# Patient Record
Sex: FEMALE | Race: BLACK OR AFRICAN AMERICAN | Employment: OTHER | URBAN - METROPOLITAN AREA
[De-identification: names, ages, dates, MRNs, and addresses within clinical notes are randomized per-mention and may not be internally consistent; named-entity substitution may affect disease eponyms.]

---

## 2017-01-11 ENCOUNTER — OFFICE VISIT (OUTPATIENT)
Dept: ENDOCRINOLOGY | Age: 77
End: 2017-01-11

## 2017-01-11 VITALS
HEIGHT: 63 IN | BODY MASS INDEX: 32.07 KG/M2 | OXYGEN SATURATION: 99 % | WEIGHT: 181 LBS | SYSTOLIC BLOOD PRESSURE: 129 MMHG | RESPIRATION RATE: 16 BRPM | DIASTOLIC BLOOD PRESSURE: 58 MMHG | HEART RATE: 65 BPM

## 2017-01-11 DIAGNOSIS — E78.49 OTHER HYPERLIPIDEMIA: ICD-10-CM

## 2017-01-11 DIAGNOSIS — I10 ESSENTIAL HYPERTENSION, BENIGN: ICD-10-CM

## 2017-01-11 LAB — GLUCOSE POC: 172 MG/DL

## 2017-01-11 RX ORDER — GLIPIZIDE 10 MG/1
10 TABLET, FILM COATED, EXTENDED RELEASE ORAL DAILY
Qty: 90 TAB | Refills: 3 | Status: SHIPPED | OUTPATIENT
Start: 2017-01-11 | End: 2017-06-19 | Stop reason: SDUPTHER

## 2017-01-11 NOTE — PROGRESS NOTES
Endocrinology Visit    Chief Complaint: Type 2 diabetes, uncontrolled    History of Present Illness: Roxane Smalls is a 64 y.o. female who returns for history of uncontrolled type 2 diabetes mellitus with last A1c 9.2% in August 2016. A1c has gradually been increasing, was 8.7% in February and lower prior to that according to . Sorin Calvo. I saw her in initial consultation in October at which time I advised she increase her glipizide CR from 5mg to 10mg and continue metformin. In the interim she reports feeling well but blood sugars are still high. Weight is stable, down 1 lb since October. Current glycemic medication regimen is metformin 1000mg BID and glipizide XR 10mg daily. Home blood glucose monitoring frequency: twice a day, fasting morning and 2 hours after eating. She is using a Prodigy meter, reviewed her glucometer today: lowest is 183, highest is 368. Averages: 14d 249, 28d 249. Fasting this morning was 225. She reports she has been putting the control solution on her meter instead of blood. POC BG in clinic today is 172. Last meal was a turkey sandwich at 12:30pm (2.5 hrs ago). The patient has not had hypoglycemia. She wears a medic alert ID that says she has diabetes. She lives at [facility] but cares for herself- checks her own blood sugar and administers her medications. Known complications include neuropathy and CAD s/p CABG. Recently had surgery for cataracts in July (sees Dr Renetta Fong). Sees Dr Pizarro for podiatry. Last microalbumin was nonelevated at 9.1 (August 2016). Lipids in August: LDL 65, HDL 46, TF 93. GFR is >60. Exercise is described as \"all the time\", walks up the stairs and in the house. Eats 3 meals/day. She is trying to decrease her bread and potato intake. Cooks most meals at home: baked chicken, turkey or fish, creamed potatoes from a box, lots of vegetables and greens. Eats oatmeal for breakfast. Uses sweet-n-low instead of sugar. Avoids pork.  Drinks mostly water, no sodas (only diet if she drinks a soda). Has dessert and fruit on occasion.     Review of Systems   General ROS: negative  Ophthalmic ROS: positive for - decreased vision  ENT ROS: negative  Endocrine ROS: negative  Respiratory ROS: no cough, shortness of breath, or wheezing  Cardiovascular ROS: no chest pain or dyspnea on exertion  Gastrointestinal ROS: no abdominal pain, change in bowel habits, or black or bloody stools  Genito-Urinary ROS: no dysuria, trouble voiding, or hematuria  Musculoskeletal ROS: negative  Neurological ROS: positive for - numbness/tingling  Dermatological ROS: negative    Problem List:  Patient Active Problem List   Diagnosis Code    Glaucoma H40.9    GERD (gastroesophageal reflux disease) K21.9    Uncontrolled type 2 diabetes mellitus with diabetic neuropathy, without long-term current use of insulin ([facility]) E11.40, E11.65    Essential hypertension, benign I10    Hyperlipidemia E78.5    Anxiety F41.9    Osteoarthritis M19.90    Cardiac pacemaker in situ Z95.0    Diabetes ([address] E11.9       Past Medical History:    Past Medical History   Diagnosis Date    Bradycardia     Diabetes ([address]     Hyperlipidemia     PUD (peptic ulcer disease)        Past Surgical History:  Past Surgical History   Procedure Laterality Date    Hx bunionectomy       right foot 11/24/07    Pr cardiac surg procedure unlist       pacemaker, bipass    Colonoscopy  2011    Hx cataract removal  07/2016       Social History:  Social History     Social History    Marital status:      Spouse name: N/A    Number of children: 4    Years of education: N/A     Occupational History    retired      Social History Main Topics    Smoking status: Never Smoker    Smokeless tobacco: Never Used    Alcohol use No    Drug use: No    Sexual activity: Not Currently     Other Topics Concern    Not on file     Social History Narrative       Family History:  Family History   Problem Relation Age of Onset    Diabetes Mother     Hypertension Father        Medications:     Current Outpatient Prescriptions:     ELIQUIS 5 mg tablet, , Disp: , Rfl:     timolol (TIMOPTIC) 0.5 % ophthalmic solution, , Disp: , Rfl:     glipiZIDE SR (GLUCOTROL) 10 mg CR tablet, Take 1 Tab by mouth daily. , Disp: 30 Tab, Rfl: 11    albuterol (PROVENTIL HFA, VENTOLIN HFA) 90 mcg/actuation inhaler, Take 2 Puffs by inhalation every four (4) hours as needed. , Disp: 1 Inhaler, Rfl: 3    simvastatin (ZOCOR) 20 mg tablet, Take 1 Tab by mouth nightly., Disp: 30 Tab, Rfl: 3    diazepam (VALIUM) 2 mg tablet, Take 1 Tab by mouth nightly as needed for Anxiety. , Disp: 30 Tab, Rfl: 2    losartan (COZAAR) 50 mg tablet, Take 1 Tab by mouth daily. , Disp: 30 Tab, Rfl: 5    metFORMIN (GLUCOPHAGE) 1,000 mg tablet, Take 1 Tab by mouth two (2) times daily (with meals). , Disp: 60 Tab, Rfl: 2    glimepiride (AMARYL) 1 mg tablet, Take  by mouth Daily (before breakfast). , Disp: , Rfl:     montelukast (SINGULAIR) 10 mg tablet, Take 10 mg by mouth daily. , Disp: , Rfl:     methenamine hippurate (HIPREX) 1 gram tablet, Take 1 g by mouth two (2) times daily (with meals). , Disp: , Rfl:     lactulose (ENULOSE) 10 gram/15 mL solution, Take  by mouth three (3) times daily. , Disp: , Rfl:     esomeprazole (NEXIUM) 40 mg capsule, Take  by mouth daily. , Disp: , Rfl:     aspirin delayed-release 81 mg tablet, Take 81 mg by mouth daily. , Disp: , Rfl:     brinzolamide (AZOPT) 1 % ophthalmic suspension, Administer 1 Drop to both eyes three (3) times daily. , Disp: , Rfl:     latanoprost (XALATAN) 0.005 % ophthalmic solution, Administer 1 Drop to both eyes nightly., Disp: , Rfl:     hydrochlorothiazide (HYDRODIURIL) 25 mg tablet, Take 25 mg by mouth daily. , Disp: , Rfl:     Azelastine (ASTEPRO) 0.15 % (205.5 mcg) nasal spray, 2 Sprays by Both Nostrils route two (2) times a day., Disp: 1 Bottle, Rfl: 11    timolol (TIMOPTIC-XR) 0.5 % ophthalmic gel-forming, Administer 1 Drop to both eyes daily. , Disp: , Rfl:     Allergies:  No Known Allergies    Physical Examination:  Blood pressure 129/58, pulse 65, resp. rate 16, height 5' 3\" (1.6 m), weight 181 lb (82.1 kg), SpO2 99 %. Body mass index is 32.06 kg/(m^2). Gen: elderly female in no acute distress  HEENT: mucous membranes moist, normocephalic, non traumatic  Thyroid: no enlargement or nodules noted  CAD: normal rate, regular rhythm. No murmur rubs or gallops  PULM: clear to ausculation, no wheezes, rhonchis or rales. GI: soft non tender, non distended. EXT: no clubbing, cyanosis or edema, wearing compression hose and diabetic shoes  Neuro: grossly non focal, normal DTRs  Psych: pleasant, good insight into medical hx    Clinical Data Review: There are no results available in [facility]. Pertinent lab results from outside records are transcribed in HPI and scanned into the medical record. Assessment and Plan:  Patient is a 64y.o. year old female here for type 2 diabetes with suboptimal glycemic control on metformin + sulfonylurea therapy. POC BG data is inaccurate because pt is applying the control solution to her test strips rather than blood (control solution contains glucose). Instructed her and demonstrated the correct way to check capillary blood glucose. Will continue current regimen based on limited BG data today (post-prandial ). Goal BG is between 100 and 200, goal A1c 7.5 to 8%. Review BG log at her next visit in 3 months. If control remains suboptimal, will start low dose basal insulin in addition to oral agents.      Glycemic Medication Changes:  - continue glipizide XR 10mg daily AC breakfast  - continue metformin 1000mg BID  - check BG twice a day and bring meter or log to next visit    DM Health Maintenance: pertinent items updated in  tab  A1c: update with next blood draw in Feb  Cv/Lipids: on Simvastatin, lipids UTD  BP/Renal: BP at goal, on ARB, microalbumin UTD  Vaccines: per PCP  Podiatry: no active issues, encouraged well-fitting footwear and daily inspection  Neruo: no evidence of neuropathy - foot exam updated Oct 2016  Ophtho: yearly eye exam recommended  Diet and exercise: discussed healthy eating and exercise recommendations, particularly reduction in dietary carbohydrates    We have discussed the importance of the need for good blood pressure and glycemic control to further reduce the risks of microvascular and macrovascular complications. We have discussed how to recognize and treat hypoglycemia. She will notify me in between appointments for hypoglycemia or persistent hyperglycemia and has my contact information. I spent 40 minutes with the patient today and > 50% of the time was spent counseling the patient about diabetes medications, preventative care, and dietary modification. Patient verbalized an understanding and will return to clinic in 3 months. Thank you for the opportunity to participate in this patient's care.     Claire Rivera MD  [facility]  [facility]

## 2017-01-11 NOTE — MR AVS SNAPSHOT
Visit Information Date & Time Provider Department Dept. Phone Encounter #  
 1/11/2017  2:30 PM Elena Saldivar, [facility] [phone] Follow-up Instructions Return in about 3 months (around 4/11/2017). Upcoming Health Maintenance Date Due  
 FOOT EXAM Q1 8/7/1950 MICROALBUMIN Q1 8/7/1950 DTaP/Tdap/Td series (1 - Tdap) 8/7/1959 ZOSTER VACCINE AGE 60> 8/7/1998 GLAUCOMA SCREENING Q2Y 8/7/2003 OSTEOPOROSIS SCREENING (DEXA) 8/7/2003 MEDICARE YEARLY EXAM 8/7/2003 HEMOGLOBIN A1C Q6M 5/1/2013 EYE EXAM RETINAL OR DILATED Q1 8/1/2013 LIPID PANEL Q1 11/1/2013 Pneumococcal 65+ Low/Medium Risk (2 of 2 - PPSV23) 1/28/2016 Allergies as of 1/11/2017  Review Complete On: 1/11/2017 By: Elena Saldivar MD  
 No Known Allergies Current Immunizations  Never Reviewed Name Date Pneumococcal Vaccine (Unspecified Type) 1/28/2011 Not reviewed this visit You Were Diagnosed With   
  
 Codes Comments Uncontrolled type 2 diabetes mellitus with diabetic neuropathy, without long-term current use of insulin ([facility]    -  Primary ICD-10-CM: E11.40, E11.65 ICD-9-CM: 250.62, 357.2 Vitals BP Pulse Resp Height(growth percentile) Weight(growth percentile) SpO2  
 129/58 65 16 5' 3\" (1.6 m) 181 lb (82.1 kg) 99% BMI OB Status Smoking Status 32.06 kg/m2 Postmenopausal Never Smoker Vitals History BMI and BSA Data Body Mass Index Body Surface Area 32.06 kg/m 2 1.91 m 2 Preferred Pharmacy Pharmacy Name Phone [facility] [address], [address] [phone] Your Updated Medication List  
  
   
This list is accurate as of: 1/11/17  3:15 PM.  Always use your most recent med list.  
  
  
  
  
 albuterol 90 mcg/actuation inhaler Commonly known as:  PROVENTIL HFA, VENTOLIN HFA, PROAIR HFA  
 Take 2 Puffs by inhalation every four (4) hours as needed. aspirin delayed-release 81 mg tablet Take 81 mg by mouth daily. Azelastine 0.15 % (205.5 mcg) nasal spray Commonly known as:  ASTEPRO  
2 Sprays by Both Nostrils route two (2) times a day. AZOPT 1 % ophthalmic suspension Generic drug:  brinzolamide Administer 1 Drop to both eyes three (3) times daily. diazePAM 2 mg tablet Commonly known as:  VALIUM Take 1 Tab by mouth nightly as needed for Anxiety. ELIQUIS 5 mg tablet Generic drug:  apixaban ENULOSE 10 gram/15 mL solution Generic drug:  lactulose Take  by mouth three (3) times daily. glipiZIDE SR 10 mg CR tablet Commonly known as:  GLUCOTROL XL Take 1 Tab by mouth daily. hydroCHLOROthiazide 25 mg tablet Commonly known as:  HYDRODIURIL Take 25 mg by mouth daily. losartan 50 mg tablet Commonly known as:  COZAAR Take 1 Tab by mouth daily. metFORMIN 1,000 mg tablet Commonly known as:  GLUCOPHAGE Take 1 Tab by mouth two (2) times daily (with meals). methenamine hippurate 1 gram tablet Commonly known as:  HIPREX Take 1 g by mouth two (2) times daily (with meals). NexIUM 40 mg capsule Generic drug:  esomeprazole Take  by mouth daily. simvastatin 20 mg tablet Commonly known as:  ZOCOR Take 1 Tab by mouth nightly. SINGULAIR 10 mg tablet Generic drug:  montelukast  
Take 10 mg by mouth daily. * timolol 0.5 % ophthalmic gel-forming Commonly known as:  TIMOPTIC-XE Administer 1 Drop to both eyes daily. * timolol 0.5 % ophthalmic solution Commonly known as:  TIMOPTIC  
  
 XALATAN 0.005 % ophthalmic solution Generic drug:  latanoprost  
Administer 1 Drop to both eyes nightly. * Notice: This list has 2 medication(s) that are the same as other medications prescribed for you.  Read the directions carefully, and ask your doctor or other care provider to review them with you. We Performed the Following AMB POC GLUCOSE, QUANTITATIVE, BLOOD [71049 CPT(R)] Follow-up Instructions Return in about 3 months (around 4/11/2017). To-Do List   
 Around 02/01/2017 Lab:  HEMOGLOBIN A1C WITH EAG Patient Instructions Continue taking the following diabetes medications: - Glipizide 10mg before breakfast 
- Metformin 1000mg twice a day (after breakfast and after dinner) Check blood sugar in the morning and in the evening. Have your A1c checked next time you have bloodwork done in February. Please have your doctor fax me the results. 
 
=================================================================== 
 
Endocrinology Instructions Thank you for choosing [facility] to care for you and your health needs. Please follow these instructions to get in touch with us if you have a problem and to prepare for upcoming appointments. 1. For any questions regarding prescriptions or if you need medical advice, our office number is [phone].  
2. Our office fax number is [phone]. 
3. You may also use [employer] to access your lab results or to send messages to your provider. 4. If there are routine labs that need to be checked, please have these done within 2-7 days prior to your next appointment. We will provide you with a copy of the lab order. 5. If you have diabetes, please make sure you bring your blood sugar log or glucometer with you to each office visit. Thank you, MD [facility] [address] [facility] Introducing [facility]! [facility] introduces [employer] patient portal. Now you can access parts of your medical record, email your doctor's office, and request medication refills online. 1. In your internet browser, go to [email or web address] 2. Click on the First Time User? Click Here link in the Sign In box. You will see the New Member Sign Up page. 3. Enter your [employer] Access Code exactly as it appears below. You will not need to use this code after youve completed the sign-up process. If you do not sign up before the expiration date, you must request a new code. · [employer] Access Code: [email or web address] Expires: 1/16/2017 10:59 AM 
 
4. Enter the last four digits of your Social Security Number (xxxx) and Date of Birth (mm/dd/yyyy) as indicated and click Submit. You will be taken to the next sign-up page. 5. Create a [employer] prime ID. This will be your [employer] login ID and cannot be changed, so think of one that is secure and easy to remember. 6. Create a [employer] password. You can change your password at any time. 7. Enter your Password Reset Question and Answer. This can be used at a later time if you forget your password. 8. Enter your e-mail address. You will receive e-mail notification when new information is available in [address] 9. Click Sign Up. You can now view and download portions of your medical record. 10. Click the Download Summary menu link to download a portable copy of your medical information. If you have questions, please visit the Frequently Asked Questions section of the [employer] website. Remember, [employer] is NOT to be used for urgent needs. For medical emergencies, dial 911. Now available from your iPhone and Android! Please provide this summary of care documentation to your next provider. Your primary care clinician is listed as Christiane Serna. If you have any questions after today's visit, please call [phone].

## 2017-01-24 ENCOUNTER — TELEPHONE (OUTPATIENT)
Dept: ENDOCRINOLOGY | Age: 77
End: 2017-01-24

## 2017-01-24 NOTE — TELEPHONE ENCOUNTER
----- Message from Roxy Perea sent at 1/24/2017  9:37 AM EST -----  Regarding: please call  Contact: [phone]  1/24/2017  9:37 AM      Please call Ms. Calli Berry regarding a form she received in the mail [phone].       Thanks

## 2017-01-25 NOTE — TELEPHONE ENCOUNTER
----- Message from Carole Ortega MD sent at 1/24/2017  3:30 PM EST -----  Regarding: FW: Buffy Sahu / Telephone      ----- Message -----     From: Owen Cody     Sent: 1/24/2017   3:17 PM       To: Carole Ortega MD  Subject: Lisa Levine / Telephone                           1/24/2017  3:17 PM    Please see message below. Thanks   ----- Message -----     From: Gurmeet Mcmillan     Sent: 1/24/2017   3:12 PM       To: [address] [id number] Office Pool  Subject: Buffy Sahu / Telephone                               Pt is returning a call back to practice. Best contact number [phone].

## 2017-04-04 ENCOUNTER — OFFICE VISIT (OUTPATIENT)
Dept: ENDOCRINOLOGY | Age: 77
End: 2017-04-04

## 2017-04-04 VITALS
WEIGHT: 180 LBS | DIASTOLIC BLOOD PRESSURE: 74 MMHG | OXYGEN SATURATION: 98 % | HEART RATE: 66 BPM | HEIGHT: 63 IN | RESPIRATION RATE: 16 BRPM | SYSTOLIC BLOOD PRESSURE: 113 MMHG | BODY MASS INDEX: 31.89 KG/M2

## 2017-04-04 RX ORDER — METFORMIN HYDROCHLORIDE 1000 MG/1
1000 TABLET ORAL 2 TIMES DAILY WITH MEALS
Qty: 180 TAB | Refills: 3 | Status: SHIPPED | OUTPATIENT
Start: 2017-04-04 | End: 2017-11-03 | Stop reason: SDUPTHER

## 2017-04-04 RX ORDER — MIRABEGRON 50 MG/1
50 TABLET, FILM COATED, EXTENDED RELEASE ORAL EVERY EVENING
COMMUNITY
Start: 2017-02-27 | End: 2018-09-04

## 2017-04-04 NOTE — PROGRESS NOTES
Endocrinology Visit    Chief Complaint: Type 2 diabetes, uncontrolled    History of Present Illness: Maris Ly is a 64 y.o. female who returns for history of uncontrolled type 2 diabetes mellitus with last A1c 9.2% in Feb 2017. A1c has gradually been increasing, was 8.7% in February and lower prior to that according to . [place]. I saw her in initial consultation in October at which time I advised she increase her glipizide CR from 5mg to 10mg and continue metformin. In the interim she reports feeling well but blood sugars are much better, all in the 100s now. Weight is stable, down 1 lb since January. Current glycemic medication regimen is metformin 1000mg BID and glipizide XR 10mg daily. Home blood glucose monitoring frequency: twice a day, fasting morning and 2 hours after eating dinner  Log review:  fasting every morning (avg around 130); 113-162 (avg around 150) in the evening  The patient has not had hypoglycemia. She wears a medic alert ID that says she has diabetes. She lives at [facility] but cares for herself- checks her own blood sugar and administers her medications. Known complications include neuropathy and CAD s/p CABG. She had surgery for cataracts in July 2016 (sees Dr Serena Youngblood). Sees Dr Denver Miller for podiatry. Last microalbumin was nonelevated at 9.1 (August 2016). Lipids in August: LDL 65, HDL 46, TF 93. GFR is >60. She is seeing a gastroenterologist and will be having an EGD done soon for what sounds like GERD. Exercise is described as \"all the time\", walks up the stairs and in the house. Eats 3 meals/day. She is trying to decrease her bread and potato intake. Cooks most meals at home: baked chicken, turkey or fish, creamed potatoes from a box, lots of vegetables and greens. Eats oatmeal for breakfast. Uses sweet-n-low instead of sugar. Avoids pork. Drinks mostly water, no sodas (only diet if she drinks a soda). Has dessert and fruit on occasion.     Review of Systems General ROS: negative  Ophthalmic ROS: positive for - decreased vision  ENT ROS: negative  Endocrine ROS: negative  Respiratory ROS: no cough, shortness of breath, or wheezing  Cardiovascular ROS: no chest pain or dyspnea on exertion  Gastrointestinal ROS: + acid reflux  Genito-Urinary ROS: no dysuria, trouble voiding, or hematuria  Musculoskeletal ROS: negative  Neurological ROS: positive for - numbness/tingling  Dermatological ROS: negative    Problem List:  Patient Active Problem List   Diagnosis Code    Glaucoma H40.9    GERD (gastroesophageal reflux disease) K21.9    Uncontrolled type 2 diabetes mellitus with diabetic neuropathy, without long-term current use of insulin ([facility]) E11.40, E11.65    Essential hypertension, benign I10    Hyperlipidemia E78.5    Anxiety F41.9    Osteoarthritis M19.90    Cardiac pacemaker in situ Z95.0    Diabetes ([facility] E11.9       Past Medical History:    Past Medical History:   Diagnosis Date    Bradycardia     Diabetes ([facility]     Hyperlipidemia     PUD (peptic ulcer disease)        Past Surgical History:  Past Surgical History:   Procedure Laterality Date    CARDIAC SURG PROCEDURE UNLIST      pacemaker, bipass    COLONOSCOPY  2011    HX BUNIONECTOMY      right foot 11/24/07    HX CATARACT REMOVAL  07/2016       Social History:  Social History     Social History    Marital status:      Spouse name: N/A    Number of children: 4    Years of education: N/A     Occupational History    retired      Social History Main Topics    Smoking status: Never Smoker    Smokeless tobacco: Never Used    Alcohol use No    Drug use: No    Sexual activity: Not Currently     Other Topics Concern    Not on file     Social History Narrative       Family History:  Family History   Problem Relation Age of Onset    Diabetes Mother     Hypertension Father        Medications:     Current Outpatient Prescriptions:     MYRBETRIQ 50 mg ER tablet, , Disp: , Rfl:     ELIQUIS 5 mg tablet, , Disp: , Rfl:     timolol (TIMOPTIC) 0.5 % ophthalmic solution, , Disp: , Rfl:     albuterol (PROVENTIL HFA, VENTOLIN HFA) 90 mcg/actuation inhaler, Take 2 Puffs by inhalation every four (4) hours as needed. , Disp: 1 Inhaler, Rfl: 3    simvastatin (ZOCOR) 20 mg tablet, Take 1 Tab by mouth nightly., Disp: 30 Tab, Rfl: 3    diazepam (VALIUM) 2 mg tablet, Take 1 Tab by mouth nightly as needed for Anxiety. , Disp: 30 Tab, Rfl: 2    losartan (COZAAR) 50 mg tablet, Take 1 Tab by mouth daily. , Disp: 30 Tab, Rfl: 5    metFORMIN (GLUCOPHAGE) 1,000 mg tablet, Take 1 Tab by mouth two (2) times daily (with meals). , Disp: 60 Tab, Rfl: 2    montelukast (SINGULAIR) 10 mg tablet, Take 10 mg by mouth daily. , Disp: , Rfl:     lactulose (ENULOSE) 10 gram/15 mL solution, Take  by mouth three (3) times daily. , Disp: , Rfl:     aspirin delayed-release 81 mg tablet, Take 81 mg by mouth daily. , Disp: , Rfl:     brinzolamide (AZOPT) 1 % ophthalmic suspension, Administer 1 Drop to both eyes three (3) times daily. , Disp: , Rfl:     latanoprost (XALATAN) 0.005 % ophthalmic solution, Administer 1 Drop to both eyes nightly., Disp: , Rfl:     hydrochlorothiazide (HYDRODIURIL) 25 mg tablet, Take 25 mg by mouth daily. , Disp: , Rfl:     glipiZIDE SR (GLUCOTROL XL) 10 mg CR tablet, Take 1 Tab by mouth daily. , Disp: 90 Tab, Rfl: 3    Azelastine (ASTEPRO) 0.15 % (205.5 mcg) nasal spray, 2 Sprays by Both Nostrils route two (2) times a day., Disp: 1 Bottle, Rfl: 11    methenamine hippurate (HIPREX) 1 gram tablet, Take 1 g by mouth two (2) times daily (with meals). , Disp: , Rfl:     esomeprazole (NEXIUM) 40 mg capsule, Take  by mouth daily. , Disp: , Rfl:     timolol (TIMOPTIC-XR) 0.5 % ophthalmic gel-forming, Administer 1 Drop to both eyes daily. , Disp: , Rfl:     Allergies:  No Known Allergies    Physical Examination:  Blood pressure 113/74, pulse 66, resp.  rate 16, height 5' 3\" (1.6 m), weight 180 lb (81.6 kg), SpO2 98 %.  Body mass index is 31.89 kg/(m^2). Gen: elderly female in no acute distress  HEENT: mucous membranes moist, normocephalic, non traumatic  Thyroid: no enlargement or nodules noted  CAD: normal rate, regular rhythm. No murmur rubs or gallops  PULM: clear to ausculation, no wheezes, rhonchis or rales. GI: soft non tender, non distended. EXT: no clubbing, cyanosis or edema, wearing compression hose and diabetic shoes  Neuro: grossly non focal, normal DTRs  Psych: pleasant, good insight into medical hx    Clinical Data Review: There are no results available in [facility]. Pertinent lab results from outside records are transcribed in HPI and scanned into the medical record. Assessment and Plan:  Patient is a 64y.o. year old female here for type 2 diabetes with improved glycemic control on metformin + sulfonylurea therapy. POC BG data reviewed today correlates with an A1c around 7% so I am concerned that her A1c value of 9.2% may be inaccurate. Will check a fructosamine today since the CBG data does not correlate with her A1c. Will continue current regimen based on BG data today. Goal BG is between 100 and 200, goal A1c 7.5 to 8%. Review BG log at her next visit in 3 months. If control remains suboptimal, will start low dose basal insulin in addition to oral agents.      Glycemic Medication Changes:  - continue glipizide XR 10mg daily AC breakfast  - continue metformin 1000mg BID  - check BG twice a day and bring meter or log to next visit    DM Health Maintenance: pertinent items updated in HM tab  A1c: update today with fructosamine  Cv/Lipids: on Simvastatin, lipids UTD  BP/Renal: BP at goal, on ARB, microalbumin UTD  Vaccines: per PCP  Podiatry: no active issues, encouraged well-fitting footwear and daily inspection  Neruo: no evidence of neuropathy - foot exam updated Oct 2016  Ophtho: yearly eye exam recommended  Diet and exercise: discussed healthy eating and exercise recommendations, particularly reduction in dietary carbohydrates    We have discussed the importance of the need for good blood pressure and glycemic control to further reduce the risks of microvascular and macrovascular complications. We have discussed how to recognize and treat hypoglycemia. She will notify me in between appointments for hypoglycemia or persistent hyperglycemia and has my contact information. I spent 25 minutes with the patient today and > 50% of the time was spent counseling the patient about diabetes medications, preventative care, and dietary modification. Patient verbalized an understanding and will return to clinic in 3-4 months. Thank you for the opportunity to participate in this patient's care. Matheus Taylor MD  [facility]  [address] [place] [address]    ------------------------------------------------------------------------  ADDENDUM 4/17/17:    Pt notified of results via phone and letter. Fructosamine correlates with an A1c of around 8.5%.     Component      Latest Ref Rng & Units 4/4/2017 4/4/2017           3:44 PM  3:44 PM   Hemoglobin A1c, (calculated)      4.8 - 5.6 % 8.7 (H)    Estimated average glucose      mg/dL 203    Fructosamine      0 - 285 umol/L  353 (H)

## 2017-04-04 NOTE — MR AVS SNAPSHOT
Visit Information Date & Time Provider Department Dept. Phone Encounter #  
 4/4/2017  2:30 PM Samra Stokes, [facility] [phone] [id number] Follow-up Instructions Return in about 3 months (around 7/4/2017). Upcoming Health Maintenance Date Due  
 FOOT EXAM Q1 8/7/1950 MICROALBUMIN Q1 8/7/1948 DTaP/Tdap/Td series (1 - Tdap) 8/7/1959 ZOSTER VACCINE AGE 60> 8/7/1998 GLAUCOMA SCREENING Q2Y 8/7/2003 OSTEOPOROSIS SCREENING (DEXA) 8/7/2003 MEDICARE YEARLY EXAM 8/7/2003 HEMOGLOBIN A1C Q6M 5/1/2013 EYE EXAM RETINAL OR DILATED Q1 8/1/2013 LIPID PANEL Q1 11/1/2013 Pneumococcal 65+ Low/Medium Risk (2 of 2 - PPSV23) 1/28/2016 Allergies as of 4/4/2017  Review Complete On: 4/4/2017 By: Subhash Carmichael No Known Allergies Current Immunizations  Never Reviewed Name Date Pneumococcal Vaccine (Unspecified Type) 1/28/2011 Not reviewed this visit You Were Diagnosed With   
  
 Codes Comments Uncontrolled type 2 diabetes mellitus with diabetic neuropathy, without long-term current use of insulin ([facility]    -  Primary ICD-10-CM: E11.40, E11.65 ICD-9-CM: 250.62, 357.2 Vitals BP Pulse Resp Height(growth percentile) Weight(growth percentile) SpO2  
 113/74 66 16 5' 3\" (1.6 m) 180 lb (81.6 kg) 98% BMI OB Status Smoking Status 31.89 kg/m2 Postmenopausal Never Smoker Vitals History BMI and BSA Data Body Mass Index Body Surface Area  
 31.89 kg/m 2 1.9 m 2 Preferred Pharmacy Pharmacy Name Phone [facility] [address] [facility], [address] [place] [address],[address] [phone] Your Updated Medication List  
  
   
This list is accurate as of: 4/4/17  3:20 PM.  Always use your most recent med list.  
  
  
  
  
 albuterol 90 mcg/actuation inhaler Commonly known as:  PROVENTIL HFA, VENTOLIN HFA, PROAIR HFA  
 Take 2 Puffs by inhalation every four (4) hours as needed. aspirin delayed-release 81 mg tablet Take 81 mg by mouth daily. Azelastine 0.15 % (205.5 mcg) nasal spray Commonly known as:  ASTEPRO  
2 Sprays by Both Nostrils route two (2) times a day. AZOPT 1 % ophthalmic suspension Generic drug:  brinzolamide Administer 1 Drop to both eyes three (3) times daily. diazePAM 2 mg tablet Commonly known as:  VALIUM Take 1 Tab by mouth nightly as needed for Anxiety. ELIQUIS 5 mg tablet Generic drug:  apixaban ENULOSE 10 gram/15 mL solution Generic drug:  lactulose Take  by mouth three (3) times daily. glipiZIDE SR 10 mg CR tablet Commonly known as:  GLUCOTROL XL Take 1 Tab by mouth daily. hydroCHLOROthiazide 25 mg tablet Commonly known as:  HYDRODIURIL Take 25 mg by mouth daily. losartan 50 mg tablet Commonly known as:  COZAAR Take 1 Tab by mouth daily. metFORMIN 1,000 mg tablet Commonly known as:  GLUCOPHAGE Take 1 Tab by mouth two (2) times daily (with meals). methenamine hippurate 1 gram tablet Commonly known as:  HIPREX Take 1 g by mouth two (2) times daily (with meals). MYRBETRIQ 50 mg ER tablet Generic drug:  mirabegron ER NexIUM 40 mg capsule Generic drug:  esomeprazole Take  by mouth daily. simvastatin 20 mg tablet Commonly known as:  ZOCOR Take 1 Tab by mouth nightly. SINGULAIR 10 mg tablet Generic drug:  montelukast  
Take 10 mg by mouth daily. * timolol 0.5 % ophthalmic gel-forming Commonly known as:  TIMOPTIC-XE Administer 1 Drop to both eyes daily. * timolol 0.5 % ophthalmic solution Commonly known as:  TIMOPTIC  
  
 XALATAN 0.005 % ophthalmic solution Generic drug:  latanoprost  
Administer 1 Drop to both eyes nightly. * Notice:   This list has 2 medication(s) that are the same as other medications prescribed for you. Read the directions carefully, and ask your doctor or other care provider to review them with you. Prescriptions Sent to Pharmacy Refills  
 metFORMIN (GLUCOPHAGE) 1,000 mg tablet 3 Sig: Take 1 Tab by mouth two (2) times daily (with meals). Class: Normal  
 Pharmacy: [address] [facility], [address] [place] [address],[facility] Floor  #: [phone] Route: Oral  
  
We Performed the Following FRUCTOSAMINE [31291 CPT(R)] HEMOGLOBIN A1C WITH EAG [93790 CPT(R)] Follow-up Instructions Return in about 3 months (around 7/4/2017). Introducing [facility]! [facility] introduces MyBuilder patient portal. Now you can access parts of your medical record, email your doctor's office, and request medication refills online. 1. In your internet browser, go to [email or web address] 2. Click on the First Time User? Click Here link in the Sign In box. You will see the New Member Sign Up page. 3. Enter your MyBuilder Access Code exactly as it appears below. You will not need to use this code after youve completed the sign-up process. If you do not sign up before the expiration date, you must request a new code. · MyBuilder Access Code: [email or web address] Expires: 7/3/2017  3:13 PM 
 
4. Enter the last four digits of your Social Security Number (xxxx) and Date of Birth (mm/dd/yyyy) as indicated and click Submit. You will be taken to the next sign-up page. 5. Create a Glassbeamt ID. This will be your MyBuilder login ID and cannot be changed, so think of one that is secure and easy to remember. 6. Create a MyBuilder password. You can change your password at any time. 7. Enter your Password Reset Question and Answer. This can be used at a later time if you forget your password. 8. Enter your e-mail address. You will receive e-mail notification when new information is available in [address] 9. Click Sign Up. You can now view and download portions of your medical record. 10. Click the Download Summary menu link to download a portable copy of your medical information. If you have questions, please visit the Frequently Asked Questions section of the [employer] website. Remember, [employer] is NOT to be used for urgent needs. For medical emergencies, dial 911. Now available from your iPhone and Android! Please provide this summary of care documentation to your next provider. Your primary care clinician is listed as Amelia Manzanares. If you have any questions after today's visit, please call [phone].

## 2017-04-05 LAB
EST. AVERAGE GLUCOSE BLD GHB EST-MCNC: 203 MG/DL
FRUCTOSAMINE SERPL-SCNC: 353 UMOL/L (ref 0–285)
HBA1C MFR BLD: 8.7 % (ref 4.8–5.6)

## 2017-06-19 NOTE — TELEPHONE ENCOUNTER
----- Message from Darylene Hampshire sent at 6/19/2017 11:11 AM EDT -----  Regarding: Dr. Harrison Thayer   Pt is requesting Rx for \" Glipizide\" to be sent to her [address] (P)[phone].      Best(C)[phone]

## 2017-06-19 NOTE — TELEPHONE ENCOUNTER
6/19/2017  11:40 AM      Please see message from [facility].         Thanks

## 2017-06-20 RX ORDER — GLIPIZIDE 10 MG/1
10 TABLET, FILM COATED, EXTENDED RELEASE ORAL DAILY
Qty: 90 TAB | Refills: 1 | Status: SHIPPED | OUTPATIENT
Start: 2017-06-20 | End: 2017-09-15 | Stop reason: SDUPTHER

## 2017-07-11 ENCOUNTER — OFFICE VISIT (OUTPATIENT)
Dept: ENDOCRINOLOGY | Age: 77
End: 2017-07-11

## 2017-07-11 VITALS
RESPIRATION RATE: 16 BRPM | HEIGHT: 63 IN | HEART RATE: 66 BPM | OXYGEN SATURATION: 98 % | DIASTOLIC BLOOD PRESSURE: 72 MMHG | SYSTOLIC BLOOD PRESSURE: 110 MMHG | BODY MASS INDEX: 30.83 KG/M2 | WEIGHT: 174 LBS

## 2017-07-11 RX ORDER — CYANOCOBALAMIN (VITAMIN B-12) 500 MCG
400 TABLET ORAL 2 TIMES DAILY
COMMUNITY

## 2017-07-11 RX ORDER — LANOLIN ALCOHOL/MO/W.PET/CERES
CREAM (GRAM) TOPICAL
COMMUNITY
End: 2018-10-01

## 2017-07-11 RX ORDER — FLUTICASONE PROPIONATE 50 MCG
SPRAY, SUSPENSION (ML) NASAL
COMMUNITY
Start: 2017-06-21 | End: 2018-09-04

## 2017-07-11 RX ORDER — CLINDAMYCIN HYDROCHLORIDE 150 MG/1
CAPSULE ORAL
COMMUNITY
Start: 2017-04-21 | End: 2018-08-27

## 2017-07-11 RX ORDER — ACETAMINOPHEN AND CODEINE PHOSPHATE 300; 30 MG/1; MG/1
TABLET ORAL
COMMUNITY
Start: 2017-04-21 | End: 2018-10-01

## 2017-07-11 RX ORDER — PIOGLITAZONEHYDROCHLORIDE 15 MG/1
TABLET ORAL
Qty: 30 TAB | Refills: 5 | Status: SHIPPED | OUTPATIENT
Start: 2017-07-11 | End: 2017-07-27

## 2017-07-11 RX ORDER — SUCRALFATE 1 G/1
1 TABLET ORAL 2 TIMES DAILY
COMMUNITY
Start: 2017-06-28

## 2017-07-11 NOTE — PATIENT INSTRUCTIONS
Diabetes Medications:  - start taking Actos (pioglitazone) 15mg daily  - continue taking Glipizide 10mg every morning  - continue taking metformin 1000mg twice a day    Twice a week: check your blood sugar 1-2 hours after a dinner  Goal blood sugars: 100-150  Call me if you have blood sugars less than 80

## 2017-07-11 NOTE — MR AVS SNAPSHOT
Visit Information Date & Time Provider Department Dept. Phone Encounter #  
 7/11/2017  2:30 PM Jules Miller, [address] [facility] Diabetes and Endocrinology [phone] [id number] Follow-up Instructions Return in about 3 months (around 10/11/2017). Upcoming Health Maintenance Date Due  
 FOOT EXAM Q1 8/7/1950 MICROALBUMIN Q1 8/7/1948 DTaP/Tdap/Td series (1 - Tdap) 8/7/1959 ZOSTER VACCINE AGE 60> 8/7/1998 OSTEOPOROSIS SCREENING (DEXA) 8/7/2003 MEDICARE YEARLY EXAM 8/7/2003 LIPID PANEL Q1 11/1/2013 Pneumococcal 65+ Low/Medium Risk (2 of 2 - PPSV23) 1/28/2016 INFLUENZA AGE 9 TO ADULT 8/1/2017 HEMOGLOBIN A1C Q6M 10/4/2017 EYE EXAM RETINAL OR DILATED Q1 12/20/2017 GLAUCOMA SCREENING Q2Y 12/20/2018 Allergies as of 7/11/2017  Review Complete On: 7/11/2017 By: Jerome Loss No Known Allergies Current Immunizations  Never Reviewed Name Date Pneumococcal Vaccine (Unspecified Type) 1/28/2011 Not reviewed this visit You Were Diagnosed With   
  
 Codes Comments Uncontrolled type 2 diabetes mellitus with diabetic neuropathy, without long-term current use of insulin ([facility]    -  Primary ICD-10-CM: E11.40, E11.65 ICD-9-CM: 250.62, 357.2 Vitals BP Pulse Resp Height(growth percentile) Weight(growth percentile) SpO2  
 110/72 66 16 5' 3\" (1.6 m) 174 lb (78.9 kg) 98% BMI OB Status Smoking Status 30.82 kg/m2 Postmenopausal Never Smoker Vitals History BMI and BSA Data Body Mass Index Body Surface Area  
 30.82 kg/m 2 1.87 m 2 Preferred Pharmacy Pharmacy Name Phone [facility] [address] [facility], [address] [facility] Floor [phone] Your Updated Medication List  
  
   
This list is accurate as of: 7/11/17  3:19 PM.  Always use your most recent med list.  
  
  
  
  
 acetaminophen-codeine 300-30 mg per tablet Commonly known as:  TYLENOL #3  
  
 albuterol 90 mcg/actuation inhaler Commonly known as:  PROVENTIL HFA, VENTOLIN HFA, PROAIR HFA Take 2 Puffs by inhalation every four (4) hours as needed. aspirin delayed-release 81 mg tablet Take 81 mg by mouth daily. Azelastine 0.15 % (205.5 mcg) nasal spray Commonly known as:  ASTEPRO  
2 Sprays by Both Nostrils route two (2) times a day. AZOPT 1 % ophthalmic suspension Generic drug:  brinzolamide Administer 1 Drop to both eyes three (3) times daily. clindamycin 150 mg capsule Commonly known as:  CLEOCIN  
  
 diazePAM 2 mg tablet Commonly known as:  VALIUM Take 1 Tab by mouth nightly as needed for Anxiety. ELIQUIS 5 mg tablet Generic drug:  apixaban ENULOSE 10 gram/15 mL solution Generic drug:  lactulose Take  by mouth three (3) times daily. fluticasone 50 mcg/actuation nasal spray Commonly known as:  FLONASE  
  
 glipiZIDE SR 10 mg CR tablet Commonly known as:  GLUCOTROL XL Take 1 Tab by mouth daily. hydroCHLOROthiazide 25 mg tablet Commonly known as:  HYDRODIURIL Take 25 mg by mouth daily. Iron 325 mg (65 mg iron) tablet Generic drug:  ferrous sulfate Take  by mouth Daily (before breakfast). losartan 50 mg tablet Commonly known as:  COZAAR Take 1 Tab by mouth daily. metFORMIN 1,000 mg tablet Commonly known as:  GLUCOPHAGE Take 1 Tab by mouth two (2) times daily (with meals). methenamine hippurate 1 gram tablet Commonly known as:  HIPREX Take 1 g by mouth two (2) times daily (with meals). MYRBETRIQ 50 mg ER tablet Generic drug:  mirabegron ER NexIUM 40 mg capsule Generic drug:  esomeprazole Take  by mouth daily. pioglitazone 15 mg tablet Commonly known as:  ACTOS Take 1 tablet daily  
  
 simvastatin 20 mg tablet Commonly known as:  ZOCOR Take 1 Tab by mouth nightly. SINGULAIR 10 mg tablet Generic drug:  montelukast  
Take 10 mg by mouth daily. sucralfate 1 gram tablet Commonly known as:  CARAFATE  
  
 timolol 0.5 % ophthalmic solution Commonly known as:  TIMOPTIC  
  
 VITAMIN D3 400 unit Tab tablet Generic drug:  cholecalciferol Take  by mouth daily. XALATAN 0.005 % ophthalmic solution Generic drug:  latanoprost  
Administer 1 Drop to both eyes nightly. Prescriptions Sent to Pharmacy Refills  
 pioglitazone (ACTOS) 15 mg tablet 5 Sig: Take 1 tablet daily Class: Normal  
 Pharmacy: [facility] [address] [place] [address], [address] [place] [address],[address] Ph #: [phone] We Performed the Following BASIC METABOLIC PANEL (7) [87024 CPT(R)] HEMOGLOBIN A1C WITH EAG [41608 CPT(R)] Follow-up Instructions Return in about 3 months (around 10/11/2017). Patient Instructions Diabetes Medications: 
- start taking Actos (pioglitazone) 15mg daily 
- continue taking Glipizide 10mg every morning 
- continue taking metformin 1000mg twice a day Twice a week: check your blood sugar 1-2 hours after a dinner Goal blood sugars: 100-150 Call me if you have blood sugars less than 80 Introducing [facility]! Chinyere Muir introduces Picocent patient portal. Now you can access parts of your medical record, email your doctor's office, and request medication refills online. 1. In your internet browser, go to [email or web address] 2. Click on the First Time User? Click Here link in the Sign In box. You will see the New Member Sign Up page. 3. Enter your Picocent Access Code exactly as it appears below. You will not need to use this code after youve completed the sign-up process. If you do not sign up before the expiration date, you must request a new code. · Picocent Access Code: [email or web address] Expires: 10/9/2017  2:47 PM 
 
4. Enter the last four digits of your Social Security Number (xxxx) and Date of Birth (mm/dd/yyyy) as indicated and click Submit.  You will be taken to the next sign-up page. 5. Create a [employer] ([employer]) ID. This will be your [employer] ([employer]) login ID and cannot be changed, so think of one that is secure and easy to remember. 6. Create a [employer] ([employer]) password. You can change your password at any time. 7. Enter your Password Reset Question and Answer. This can be used at a later time if you forget your password. 8. Enter your e-mail address. You will receive e-mail notification when new information is available in [address] 9. Click Sign Up. You can now view and download portions of your medical record. 10. Click the Download Summary menu link to download a portable copy of your medical information. If you have questions, please visit the Frequently Asked Questions section of the [employer] ([employer]) website. Remember, [employer] ([employer]) is NOT to be used for urgent needs. For medical emergencies, dial 911. Now available from your iPhone and Android! Please provide this summary of care documentation to your next provider. Your primary care clinician is listed as Mennie Roles. If you have any questions after today's visit, please call [phone].

## 2017-07-11 NOTE — PROGRESS NOTES
Endocrinology Visit    Chief Complaint: Type 2 diabetes, uncontrolled    History of Present Illness: Chanel Gandara is a 64 y.o. female who returns for history of uncontrolled type 2 diabetes mellitus. Her A1c was 9.2% in Feb 2017 but decreased to 8.7% on her last check in April. I did not make any medication adjustments at her last visit because her fingerstick glucose readings were at goal. In the interim, she underwent pacemaker placement and has been recovering from surgery the past few weeks. She is feeling well and feels blood sugars are doing well - all still in the 100s. She has also lost 6 lbs since her last visit which she attributes to walking more. Current glycemic medication regimen is metformin 1000mg BID and glipizide XR 10mg daily. Home blood glucose monitoring frequency: twice a day, fasting morning and before dinner  Log review: 105-150 fasting every morning (avg around 130); 110-185 (avg around 150) in the evening  The patient has not had hypoglycemia. She wears a medic alert ID that says she has diabetes. She lives at [facility] but cares for herself- checks her own blood sugar and administers her medications. Known complications include neuropathy and CAD s/p CABG. She had surgery for cataracts in July 2016 (sees Dr Claire Lentz). Sees Dr Vidhya Clarke for podiatry. Last microalbumin was nonelevated at 9.1 (August 2016). Lipids in August 2016: LDL 65, HDL 46, TF 93. GFR is >60. She recently had an EGD which had benign results. Exercise is described as \"all the time\", walks up the stairs and in the house. Eats 3 meals/day. She is trying to decrease her bread and potato intake. Cooks most meals at home: baked chicken, turkey or fish, creamed potatoes from a box, lots of vegetables and greens. Eats oatmeal for breakfast. Uses sweet-n-low instead of sugar. Avoids pork and fried foods. Drinks mostly water, no sodas (only diet if she drinks a soda). Has dessert and fruit on occasion.     Review of Systems as above, otherwise a 7 pt review is negative    Problem List:  Patient Active Problem List   Diagnosis Code    Glaucoma H40.9    GERD (gastroesophageal reflux disease) K21.9    Uncontrolled type 2 diabetes mellitus with diabetic neuropathy, without long-term current use of insulin ([facility]) E11.40, E11.65    Essential hypertension, benign I10    Hyperlipidemia E78.5    Anxiety F41.9    Osteoarthritis M19.90    Cardiac pacemaker in situ Z95.0    Diabetes ([facility] E11.9       Past Medical History:    Past Medical History:   Diagnosis Date    Bradycardia     Diabetes ([facility]     Hyperlipidemia     PUD (peptic ulcer disease)        Past Surgical History:  Past Surgical History:   Procedure Laterality Date    CARDIAC SURG PROCEDURE UNLIST      pacemaker, bipass    COLONOSCOPY  2011    HX BUNIONECTOMY      right foot 11/24/07    HX CATARACT REMOVAL  07/2016       Social History:  Social History     Social History    Marital status:      Spouse name: N/A    Number of children: 3    Years of education: N/A     Occupational History    retired      Social History Main Topics    Smoking status: Never Smoker    Smokeless tobacco: Never Used    Alcohol use No    Drug use: No    Sexual activity: Not Currently     Other Topics Concern    Not on file     Social History Narrative       Family History:  Family History   Problem Relation Age of Onset    Diabetes Mother     Hypertension Father        Medications:     Current Outpatient Prescriptions:     acetaminophen-codeine (TYLENOL #3) 300-30 mg per tablet, , Disp: , Rfl:     clindamycin (CLEOCIN) 150 mg capsule, , Disp: , Rfl:     fluticasone (FLONASE) 50 mcg/actuation nasal spray, , Disp: , Rfl:     sucralfate (CARAFATE) 1 gram tablet, , Disp: , Rfl:     cholecalciferol (VITAMIN D3) 400 unit tab tablet, Take  by mouth daily. , Disp: , Rfl:     ferrous sulfate (IRON) 325 mg (65 mg iron) tablet, Take  by mouth Daily (before breakfast). , Disp: , Rfl:     glipiZIDE SR (GLUCOTROL XL) 10 mg CR tablet, Take 1 Tab by mouth daily. , Disp: 90 Tab, Rfl: 1    MYRBETRIQ 50 mg ER tablet, , Disp: , Rfl:     metFORMIN (GLUCOPHAGE) 1,000 mg tablet, Take 1 Tab by mouth two (2) times daily (with meals). , Disp: 180 Tab, Rfl: 3    ELIQUIS 5 mg tablet, , Disp: , Rfl:     timolol (TIMOPTIC) 0.5 % ophthalmic solution, , Disp: , Rfl:     albuterol (PROVENTIL HFA, VENTOLIN HFA) 90 mcg/actuation inhaler, Take 2 Puffs by inhalation every four (4) hours as needed. , Disp: 1 Inhaler, Rfl: 3    simvastatin (ZOCOR) 20 mg tablet, Take 1 Tab by mouth nightly., Disp: 30 Tab, Rfl: 3    diazepam (VALIUM) 2 mg tablet, Take 1 Tab by mouth nightly as needed for Anxiety. , Disp: 30 Tab, Rfl: 2    losartan (COZAAR) 50 mg tablet, Take 1 Tab by mouth daily. , Disp: 30 Tab, Rfl: 5    lactulose (ENULOSE) 10 gram/15 mL solution, Take  by mouth three (3) times daily. , Disp: , Rfl:     aspirin delayed-release 81 mg tablet, Take 81 mg by mouth daily. , Disp: , Rfl:     brinzolamide (AZOPT) 1 % ophthalmic suspension, Administer 1 Drop to both eyes three (3) times daily. , Disp: , Rfl:     latanoprost (XALATAN) 0.005 % ophthalmic solution, Administer 1 Drop to both eyes nightly., Disp: , Rfl:     hydrochlorothiazide (HYDRODIURIL) 25 mg tablet, Take 25 mg by mouth daily. , Disp: , Rfl:     Azelastine (ASTEPRO) 0.15 % (205.5 mcg) nasal spray, 2 Sprays by Both Nostrils route two (2) times a day., Disp: 1 Bottle, Rfl: 11    montelukast (SINGULAIR) 10 mg tablet, Take 10 mg by mouth daily. , Disp: , Rfl:     methenamine hippurate (HIPREX) 1 gram tablet, Take 1 g by mouth two (2) times daily (with meals). , Disp: , Rfl:     esomeprazole (NEXIUM) 40 mg capsule, Take  by mouth daily. , Disp: , Rfl:     Allergies:  No Known Allergies    Physical Examination:  Blood pressure 110/72, pulse 66, resp. rate 16, height 5' 3\" (1.6 m), weight 174 lb (78.9 kg), SpO2 98 %.   Body mass index is 30.82 kg/(m^2). Gen: elderly female in no acute distress  HEENT: mucous membranes moist, normocephalic, non traumatic  Thyroid: no enlargement or nodules noted  Chest: healing scar over pacemaker implanted in right upper chest - no purulence or discharge  CAD: normal rate, regular rhythm. No murmur rubs or gallops  PULM: clear to ausculation, no wheezes, rhonchis or rales. EXT: no clubbing, cyanosis or edema, wearing compression hose and diabetic shoes  Neuro: grossly non focal  Psych: pleasant, good insight into medical hx    Clinical Data Review:     Component      Latest Ref Rng & Units 4/4/2017 4/4/2017           3:44 PM  3:44 PM   Hemoglobin A1c, (calculated)      4.8 - 5.6 % 8.7 (H)    Estimated average glucose      mg/dL 203    Fructosamine      0 - 285 umol/L  353 (H)     Assessment and Plan:  Patient is a 64y.o. year old female here for type 2 diabetes with improved glycemic control on metformin + sulfonylurea therapy. POC BG data reviewed today correlates with an A1c around 7% but her last A1c was 8.7% (avg ~200). Fructosamine correlated with A1c ~8.5%. Since she mostly checks her blood sugar before meals, I advised her to start checking 1-2 hours after meals on occasion. I suspect she is having post-prandial hyperglycemia. Will add low-dose pioglitazone to her regimen of metformin and glipizide to help improve blood sugars. Goal BG is between 100 and 200, goal A1c 7.5 to 8%. Review BG log at her next visit in 3 months. She will notify me for lows or highs in the interim.       Glycemic Medication Changes:  - start pioglitazone 15mg daily  - continue glipizide XR 10mg daily AC breakfast  - continue metformin 1000mg BID  - check BG twice a day and bring meter or log to next visit    DM Health Maintenance: pertinent items updated in HM tab  A1c: update today   Cv/Lipids: on Simvastatin, lipids UTD  BP/Renal: BP at goal, on ARB, microalbumin UTD  Vaccines: per PCP  Podiatry: no active issues, encouraged well-fitting footwear and daily inspection  Neruo: no evidence of neuropathy - foot exam updated Oct 2016  Ophtho: yearly eye exam recommended  Diet and exercise: discussed healthy eating and exercise recommendations, particularly reduction in dietary carbohydrates    I spent 25 minutes with the patient today and > 50% of the time was spent counseling the patient about diabetes medications, preventative care, and dietary modification. Patient verbalized an understanding and will return to clinic in 3 months. Thank you for the opportunity to participate in this patient's care.     Meaghan Ghosh MD  [facility]  [address] [facility]

## 2017-07-12 LAB
BUN SERPL-MCNC: 17 MG/DL (ref 8–27)
BUN/CREAT SERPL: 22 (ref 12–28)
CHLORIDE SERPL-SCNC: 99 MMOL/L (ref 96–106)
CO2 SERPL-SCNC: 25 MMOL/L (ref 18–29)
CREAT SERPL-MCNC: 0.76 MG/DL (ref 0.57–1)
EST. AVERAGE GLUCOSE BLD GHB EST-MCNC: 217 MG/DL
GLUCOSE SERPL-MCNC: 92 MG/DL (ref 65–99)
HBA1C MFR BLD: 9.2 % (ref 4.8–5.6)
POTASSIUM SERPL-SCNC: 4.1 MMOL/L (ref 3.5–5.2)
SODIUM SERPL-SCNC: 141 MMOL/L (ref 134–144)

## 2017-07-27 RX ORDER — PIOGLITAZONEHYDROCHLORIDE 15 MG/1
TABLET ORAL
Qty: 90 TAB | Refills: 3 | Status: SHIPPED | OUTPATIENT
Start: 2017-07-27 | End: 2017-09-15 | Stop reason: SDUPTHER

## 2017-09-15 RX ORDER — GLIPIZIDE 10 MG/1
10 TABLET, FILM COATED, EXTENDED RELEASE ORAL DAILY
Qty: 90 TAB | Refills: 1 | Status: SHIPPED | OUTPATIENT
Start: 2017-09-15 | End: 2017-10-16 | Stop reason: SDUPTHER

## 2017-09-15 RX ORDER — PIOGLITAZONEHYDROCHLORIDE 15 MG/1
TABLET ORAL
Qty: 90 TAB | Refills: 3 | Status: SHIPPED | OUTPATIENT
Start: 2017-09-15 | End: 2017-10-16 | Stop reason: SDUPTHER

## 2017-10-16 RX ORDER — PIOGLITAZONEHYDROCHLORIDE 15 MG/1
TABLET ORAL
Qty: 90 TAB | Refills: 3 | Status: SHIPPED | OUTPATIENT
Start: 2017-10-16 | End: 2017-11-03 | Stop reason: SDUPTHER

## 2017-10-16 RX ORDER — GLIPIZIDE 10 MG/1
10 TABLET, FILM COATED, EXTENDED RELEASE ORAL DAILY
Qty: 90 TAB | Refills: 3 | Status: SHIPPED | OUTPATIENT
Start: 2017-10-16 | End: 2017-11-03 | Stop reason: SDUPTHER

## 2017-10-16 NOTE — TELEPHONE ENCOUNTER
10/16/2017  3:55 PM        Please call Ms. Rebecca Garzon when you have a moment regarding her medication.         Thanks

## 2017-10-25 ENCOUNTER — TELEPHONE (OUTPATIENT)
Dept: ENDOCRINOLOGY | Age: 77
End: 2017-10-25

## 2017-10-27 NOTE — TELEPHONE ENCOUNTER
Pt stated that her lancets is not fitting into her lancets device. Pt and I both called [employer] regarding her new lancets and was told that they are they correct lancets for her device. I encourage her to take her lancets along with the device to her PCP's office  At her upcoming appt to see if the nurse is able to assist her.

## 2017-10-27 NOTE — TELEPHONE ENCOUNTER
Pt did not have any transportation the day of her last visit and was told by Dwight Fontenot, that you didn't have any f/u appt until January.

## 2017-11-03 ENCOUNTER — OFFICE VISIT (OUTPATIENT)
Dept: ENDOCRINOLOGY | Age: 77
End: 2017-11-03

## 2017-11-03 VITALS
OXYGEN SATURATION: 98 % | HEART RATE: 65 BPM | BODY MASS INDEX: 32.07 KG/M2 | WEIGHT: 181 LBS | SYSTOLIC BLOOD PRESSURE: 115 MMHG | HEIGHT: 63 IN | RESPIRATION RATE: 16 BRPM | DIASTOLIC BLOOD PRESSURE: 78 MMHG

## 2017-11-03 RX ORDER — METFORMIN HYDROCHLORIDE 1000 MG/1
1000 TABLET ORAL 2 TIMES DAILY WITH MEALS
Qty: 180 TAB | Refills: 3 | Status: SHIPPED | OUTPATIENT
Start: 2017-11-03 | End: 2018-08-27 | Stop reason: SDUPTHER

## 2017-11-03 RX ORDER — GLIPIZIDE 10 MG/1
10 TABLET, FILM COATED, EXTENDED RELEASE ORAL DAILY
Qty: 90 TAB | Refills: 3 | Status: SHIPPED | OUTPATIENT
Start: 2017-11-03 | End: 2018-07-17 | Stop reason: SDUPTHER

## 2017-11-03 RX ORDER — PIOGLITAZONEHYDROCHLORIDE 15 MG/1
TABLET ORAL
Qty: 90 TAB | Refills: 3 | Status: SHIPPED | OUTPATIENT
Start: 2017-11-03 | End: 2018-02-27 | Stop reason: SDUPTHER

## 2017-11-03 RX ORDER — LANCING DEVICE
EACH MISCELLANEOUS
Qty: 1 EACH | Refills: 0 | Status: SHIPPED | OUTPATIENT
Start: 2017-11-03 | End: 2018-08-27

## 2017-11-03 NOTE — PROGRESS NOTES
Endocrinology Visit    Chief Complaint: Type 2 diabetes    History of Present Illness: Dia Brody is a 76 y.o. female who returns for type 2 diabetes mellitus. Her A1c was 9.2% in Feb 2017 but decreased to 8.7% on her last check in April. I did not make any medication adjustments at her last visit because her fingerstick glucose readings were at goal. At her last visit, both A1c and fructosamine were above goal so I added pioglitazone 15mg daily. In the interim, she says her sugars have been better. Now checking sugars twice a day: fasting in the morning and 2-3 hours after dinner as I instructed her to at her last visit. Brings in detailed log today. Current glycemic medication regimen is metformin 1000mg BID, pioglitazone 15mg daily, and glipizide XR 10mg daily. Home blood glucose monitoring frequency: twice a day, fasting morning and after dinner  Log review: 121-165 fasting every morning (avg around 130); 110-160 (avg around 140) in the evening  The patient has not had hypoglycemia. She wears a medic alert ID that says she has diabetes. She lives at [facility] but cares for herself- checks her own blood sugar and administers her medications. Reports having issues with her meter and lancing device lately. Known complications include neuropathy and CAD s/p CABG. She had surgery for cataracts in July 2016 (sees Dr Ke Barillas). Sees Dr Maria Ines Jennings for podiatry. Last microalbumin was nonelevated at 9.1 (August 2016). Lipids in August 2016: LDL 65, HDL 46, TF 93. GFR is >60. She recently had an EGD which had benign results. Exercise is described as \"all the time\", walks up the stairs and in the house. Eats 3 meals/day. She is trying to decrease her bread and potato intake. Cooks most meals at home: baked chicken, turkey or fish, creamed potatoes from a box, lots of vegetables and greens. Eats oatmeal for breakfast. Uses sweet-n-low instead of sugar. Avoids pork and fried foods.  Drinks mostly water, no sodas (only diet if she drinks a soda). Has dessert and fruit on occasion. Review of Systems as above, otherwise a 7 pt review is negative    Problem List:  Patient Active Problem List   Diagnosis Code    Glaucoma H40.9    GERD (gastroesophageal reflux disease) K21.9    Uncontrolled type 2 diabetes mellitus with diabetic neuropathy, without long-term current use of insulin ([facility] E11.40, E11.65    Essential hypertension, benign I10    Hyperlipidemia E78.5    Anxiety F41.9    Osteoarthritis M19.90    Cardiac pacemaker in situ Z95.0    Diabetes ([address] E11.9       Past Medical History:    Past Medical History:   Diagnosis Date    Bradycardia     Diabetes ([address]     Hyperlipidemia     PUD (peptic ulcer disease)        Past Surgical History:  Past Surgical History:   Procedure Laterality Date    CARDIAC SURG PROCEDURE UNLIST      pacemaker, bipass    COLONOSCOPY  2011    HX BUNIONECTOMY      right foot 11/24/07    HX CATARACT REMOVAL  07/2016       Social History:  Social History     Social History    Marital status:      Spouse name: N/A    Number of children: 3    Years of education: N/A     Occupational History    retired      Social History Main Topics    Smoking status: Never Smoker    Smokeless tobacco: Never Used    Alcohol use No    Drug use: No    Sexual activity: Not Currently     Other Topics Concern    Not on file     Social History Narrative       Family History:  Family History   Problem Relation Age of Onset    Diabetes Mother     Hypertension Father        Medications:     Current Outpatient Prescriptions:     glipiZIDE SR (GLUCOTROL XL) 10 mg CR tablet, Take 1 Tab by mouth daily.  Indications: type 2 diabetes mellitus, Disp: 90 Tab, Rfl: 3    pioglitazone (ACTOS) 15 mg tablet, Take 1 tablet daily  Indications: type 2 diabetes mellitus, Disp: 90 Tab, Rfl: 3    acetaminophen-codeine (TYLENOL #3) 300-30 mg per tablet, , Disp: , Rfl:     clindamycin (CLEOCIN) 150 mg capsule, , Disp: , Rfl:     fluticasone (FLONASE) 50 mcg/actuation nasal spray, , Disp: , Rfl:     cholecalciferol (VITAMIN D3) 400 unit tab tablet, Take  by mouth daily. , Disp: , Rfl:     ferrous sulfate (IRON) 325 mg (65 mg iron) tablet, Take  by mouth Daily (before breakfast). , Disp: , Rfl:     metFORMIN (GLUCOPHAGE) 1,000 mg tablet, Take 1 Tab by mouth two (2) times daily (with meals). , Disp: 180 Tab, Rfl: 3    ELIQUIS 5 mg tablet, , Disp: , Rfl:     timolol (TIMOPTIC) 0.5 % ophthalmic solution, , Disp: , Rfl:     albuterol (PROVENTIL HFA, VENTOLIN HFA) 90 mcg/actuation inhaler, Take 2 Puffs by inhalation every four (4) hours as needed. , Disp: 1 Inhaler, Rfl: 3    simvastatin (ZOCOR) 20 mg tablet, Take 1 Tab by mouth nightly., Disp: 30 Tab, Rfl: 3    diazepam (VALIUM) 2 mg tablet, Take 1 Tab by mouth nightly as needed for Anxiety. , Disp: 30 Tab, Rfl: 2    losartan (COZAAR) 50 mg tablet, Take 1 Tab by mouth daily. , Disp: 30 Tab, Rfl: 5    montelukast (SINGULAIR) 10 mg tablet, Take 10 mg by mouth daily. , Disp: , Rfl:     esomeprazole (NEXIUM) 40 mg capsule, Take  by mouth daily. , Disp: , Rfl:     aspirin delayed-release 81 mg tablet, Take 81 mg by mouth daily. , Disp: , Rfl:     brinzolamide (AZOPT) 1 % ophthalmic suspension, Administer 1 Drop to both eyes three (3) times daily. , Disp: , Rfl:     latanoprost (XALATAN) 0.005 % ophthalmic solution, Administer 1 Drop to both eyes nightly., Disp: , Rfl:     hydrochlorothiazide (HYDRODIURIL) 25 mg tablet, Take 25 mg by mouth daily. , Disp: , Rfl:     sucralfate (CARAFATE) 1 gram tablet, , Disp: , Rfl:     MYRBETRIQ 50 mg ER tablet, , Disp: , Rfl:     Azelastine (ASTEPRO) 0.15 % (205.5 mcg) nasal spray, 2 Sprays by Both Nostrils route two (2) times a day., Disp: 1 Bottle, Rfl: 11    methenamine hippurate (HIPREX) 1 gram tablet, Take 1 g by mouth two (2) times daily (with meals). , Disp: , Rfl:     lactulose (ENULOSE) 10 gram/15 mL solution, Take  by mouth three (3) times daily. , Disp: , Rfl:     Allergies:  No Known Allergies    Physical Examination:  Blood pressure 115/78, pulse 65, resp. rate 16, height 5' 3\" (1.6 m), weight 181 lb (82.1 kg), SpO2 98 %. Body mass index is 32.06 kg/(m^2). Gen: elderly female in no acute distress  HEENT: mucous membranes moist  Thyroid: no enlargement or nodules noted  Chest: healed scar over pacemaker implanted in right upper chest  CAD: normal rate, regular rhythm. No murmur rubs or gallops  PULM: clear to ausculation, no wheezes, rhonchis or rales. EXT: no clubbing, cyanosis or edema, wearing compression hose and diabetic shoes  Neuro: grossly non focal  Psych: pleasant, good insight into medical hx    Clinical Data Review:     Component      Latest Ref Rng & Units 4/4/2017 4/4/2017           3:44 PM  3:44 PM   Hemoglobin A1c, (calculated)      4.8 - 5.6 % 8.7 (H)    Estimated average glucose      mg/dL 203    Fructosamine      0 - 285 umol/L  353 (H)     Assessment and Plan:  Patient is a 64y.o. year old female here for type 2 diabetes with improved glycemic control on metformin, TZD, and sulfonylurea therapy. POC BG data reviewed today correlates with an A1c around 7% but her last A1c was 8.7% (avg ~200). Will continue current regimen and repeat A1c with fructosamine today. Provided new OneTouch meter for pt as well.        Glycemic Medication Changes:  - continue pioglitazone 15mg daily  - continue glipizide XR 10mg daily AC breakfast  - continue metformin 1000mg BID  - check BG twice a day and bring meter or log to next visit    DM Health Maintenance: pertinent items updated in HM tab  A1c: update today with fructosamine for comparison  Cv/Lipids: on Simvastatin, lipids need to be update but pt is not fasting today  BP/Renal: BP at goal, on ARB, microalbumin: recheck today  Vaccines: per PCP  Podiatry: no active issues, encouraged well-fitting footwear and daily inspection  Neruo: no evidence of neuropathy - foot exam updated Oct 2016  Ophtho: yearly eye exam recommended  Diet and exercise: discussed healthy eating and exercise recommendations, particularly reduction in dietary carbohydrates    I spent 25 minutes with the patient today and > 50% of the time was spent counseling the patient about diabetes medications, preventative care, and dietary modification. Patient verbalized an understanding and will return to clinic in 3 months. Thank you for the opportunity to participate in this patient's care. MD [facility]  [address] [place] [address]    ------------------------------------------------------------------------  [facility] 4/2/18: Information required per Medicare guidelines for glucose monitoring frequency:  Due to potential for hypoglycemia in this elderly patient on sulfonylurea therapy, I recommend blood glucose monitoring at minimum twice a day. Please provide testing supplies for at least two blood glucose checks per day.     Thank you,    MD [facility]  [address] [place] [address]

## 2017-11-03 NOTE — MR AVS SNAPSHOT
Visit Information Date & Time Provider Department Dept. Phone Encounter #  
 11/3/2017  2:30 PM Romi Michaelsalexander, [address] [place] [address] Diabetes and Endocrinology 21  Upcoming Health Maintenance Date Due  
 FOOT EXAM Q1 8/7/1950 MICROALBUMIN Q1 8/7/1948 DTaP/Tdap/Td series (1 - Tdap) 8/7/1959 ZOSTER VACCINE AGE 60> 6/7/1998 OSTEOPOROSIS SCREENING (DEXA) 8/7/2003 MEDICARE YEARLY EXAM 8/7/2003 LIPID PANEL Q1 11/1/2013 Pneumococcal 65+ Low/Medium Risk (2 of 2 - PPSV23) 1/28/2016 INFLUENZA AGE 9 TO ADULT 8/1/2017 EYE EXAM RETINAL OR DILATED Q1 12/20/2017 HEMOGLOBIN A1C Q6M 1/11/2018 GLAUCOMA SCREENING Q2Y 12/20/2018 Allergies as of 11/3/2017  Review Complete On: 11/3/2017 By: Marva Doss No Known Allergies Current Immunizations  Never Reviewed Name Date ZZZ-RETIRED (DO NOT USE) Pneumococcal Vaccine (Unspecified Type) 1/28/2011 Not reviewed this visit You Were Diagnosed With   
  
 Codes Comments Uncontrolled type 2 diabetes mellitus with diabetic neuropathy, without long-term current use of insulin ([facility]    -  Primary ICD-10-CM: E11.40, E11.65 ICD-9-CM: 250.62, 357.2 Vitals BP Pulse Resp Height(growth percentile) Weight(growth percentile) SpO2  
 115/78 65 16 5' 3\" (1.6 m) 181 lb (82.1 kg) 98% BMI OB Status Smoking Status 32.06 kg/m2 Postmenopausal Never Smoker Vitals History BMI and BSA Data Body Mass Index Body Surface Area 32.06 kg/m 2 1.91 m 2 Preferred Pharmacy Pharmacy Name Phone Lauretn Kirk ([address] [facility]) [facility] [phone] Your Updated Medication List  
  
   
This list is accurate as of: 11/3/17  3:14 PM.  Always use your most recent med list.  
  
  
  
  
 acetaminophen-codeine 300-30 mg per tablet Commonly known as:  TYLENOL #3  
  
 albuterol 90 mcg/actuation inhaler Commonly known as:  PROVENTIL HFA, VENTOLIN HFA, PROAIR HFA  
 Take 2 Puffs by inhalation every four (4) hours as needed. aspirin delayed-release 81 mg tablet Take 81 mg by mouth daily. Azelastine 0.15 % (205.5 mcg) nasal spray Commonly known as:  ASTEPRO  
2 Sprays by Both Nostrils route two (2) times a day. AZOPT 1 % ophthalmic suspension Generic drug:  brinzolamide Administer 1 Drop to both eyes three (3) times daily. clindamycin 150 mg capsule Commonly known as:  CLEOCIN  
  
 diazePAM 2 mg tablet Commonly known as:  VALIUM Take 1 Tab by mouth nightly as needed for Anxiety. ELIQUIS 5 mg tablet Generic drug:  apixaban ENULOSE 10 gram/15 mL solution Generic drug:  lactulose Take  by mouth three (3) times daily. fluticasone 50 mcg/actuation nasal spray Commonly known as:  FLONASE  
  
 glipiZIDE SR 10 mg CR tablet Commonly known as:  GLUCOTROL XL Take 1 Tab by mouth daily. Indications: type 2 diabetes mellitus  
  
 glucose blood VI test strips strip Commonly known as:  Jo Ann Memos Check BG 1-2 times/day  
  
 hydroCHLOROthiazide 25 mg tablet Commonly known as:  HYDRODIURIL Take 25 mg by mouth daily. Iron 325 mg (65 mg iron) tablet Generic drug:  ferrous sulfate Take  by mouth Daily (before breakfast). Lancing Device Misc Check BG 1-2 times/day  
  
 losartan 50 mg tablet Commonly known as:  COZAAR Take 1 Tab by mouth daily. metFORMIN 1,000 mg tablet Commonly known as:  GLUCOPHAGE Take 1 Tab by mouth two (2) times daily (with meals). methenamine hippurate 1 gram tablet Commonly known as:  HIPREX Take 1 g by mouth two (2) times daily (with meals). MYRBETRIQ 50 mg ER tablet Generic drug:  mirabegron ER NexIUM 40 mg capsule Generic drug:  esomeprazole Take  by mouth daily. pioglitazone 15 mg tablet Commonly known as:  ACTOS Take 1 tablet daily  Indications: type 2 diabetes mellitus  
  
 simvastatin 20 mg tablet Commonly known as:  ZOCOR Take 1 Tab by mouth nightly. SINGULAIR 10 mg tablet Generic drug:  montelukast  
Take 10 mg by mouth daily. sucralfate 1 gram tablet Commonly known as:  CARAFATE  
  
 timolol 0.5 % ophthalmic solution Commonly known as:  TIMOPTIC  
  
 VITAMIN D3 400 unit Tab tablet Generic drug:  cholecalciferol Take  by mouth daily. XALATAN 0.005 % ophthalmic solution Generic drug:  latanoprost  
Administer 1 Drop to both eyes nightly. Prescriptions Printed Refills Lancing Device misc 0 Sig: Check BG 1-2 times/day Class: Print  
 glucose blood VI test strips (ONETOUCH VERIO) strip 11 Sig: Check BG 1-2 times/day Class: Print We Performed the Following FRUCTOSAMINE [67536 CPT(R)] HEMOGLOBIN A1C WITH EAG [99565 CPT(R)] MICROALBUMIN, UR, RAND W/ MICROALBUMIN/CREA RATIO [id number] CPT(R)] Introducing [facility]! Chilo Jain introduces [employer] patient portal. Now you can access parts of your medical record, email your doctor's office, and request medication refills online. 1. In your internet browser, go to [email or web address] 2. Click on the First Time User? Click Here link in the Sign In box. You will see the New Member Sign Up page. 3. Enter your [employer] Access Code exactly as it appears below. You will not need to use this code after youve completed the sign-up process. If you do not sign up before the expiration date, you must request a new code. · DVDPlay Access Code: [email or web address] Expires: 2/1/2018  2:58 PM 
 
4. Enter the last four digits of your Social Security Number (xxxx) and Date of Birth (mm/dd/yyyy) as indicated and click Submit. You will be taken to the next sign-up page. 5. Create a DVDPlay ID. This will be your DVDPlay login ID and cannot be changed, so think of one that is secure and easy to remember. 6. Create a [employer] password. You can change your password at any time. 7. Enter your Password Reset Question and Answer. This can be used at a later time if you forget your password. 8. Enter your e-mail address. You will receive e-mail notification when new information is available in [address] 9. Click Sign Up. You can now view and download portions of your medical record. 10. Click the Download Summary menu link to download a portable copy of your medical information. If you have questions, please visit the Frequently Asked Questions section of the [employer] website. Remember, [employer] is NOT to be used for urgent needs. For medical emergencies, dial 911. Now available from your iPhone and Android! Please provide this summary of care documentation to your next provider. Your primary care clinician is listed as May Conner. If you have any questions after today's visit, please call [phone].

## 2017-11-03 NOTE — PROGRESS NOTES
Lab Results   Component Value Date/Time    Hemoglobin A1c 9.2 07/11/2017 03:41 PM    Hemoglobin A1c 8.7 04/04/2017 03:44 PM

## 2017-11-04 LAB
ALBUMIN/CREAT UR: 27.8 MG/G CREAT (ref 0–30)
CREAT UR-MCNC: 140.2 MG/DL
EST. AVERAGE GLUCOSE BLD GHB EST-MCNC: 192 MG/DL
FRUCTOSAMINE SERPL-SCNC: 388 UMOL/L (ref 0–285)
HBA1C MFR BLD: 8.3 % (ref 4.8–5.6)
MICROALBUMIN UR-MCNC: 39 UG/ML

## 2018-02-27 RX ORDER — PIOGLITAZONEHYDROCHLORIDE 15 MG/1
TABLET ORAL
Qty: 90 TAB | Refills: 3 | Status: SHIPPED | OUTPATIENT
Start: 2018-02-27 | End: 2018-05-01 | Stop reason: DRUGHIGH

## 2018-02-27 NOTE — TELEPHONE ENCOUNTER
2/27/2018  1:24 PM          Would like a refill for    -pioglitazone (ACTOS) 15 mg tablet      Please send to  [facility]/PHARMACY #[id number]- [place] - [address]        Thanks

## 2018-05-01 ENCOUNTER — OFFICE VISIT (OUTPATIENT)
Dept: ENDOCRINOLOGY | Age: 78
End: 2018-05-01

## 2018-05-01 VITALS
RESPIRATION RATE: 18 BRPM | HEIGHT: 63 IN | HEART RATE: 65 BPM | OXYGEN SATURATION: 98 % | SYSTOLIC BLOOD PRESSURE: 106 MMHG | DIASTOLIC BLOOD PRESSURE: 63 MMHG | WEIGHT: 163.8 LBS | BODY MASS INDEX: 29.02 KG/M2

## 2018-05-01 DIAGNOSIS — E78.49 OTHER HYPERLIPIDEMIA: ICD-10-CM

## 2018-05-01 DIAGNOSIS — E11.59 TYPE 2 DIABETES MELLITUS WITH OTHER CIRCULATORY COMPLICATION, WITHOUT LONG-TERM CURRENT USE OF INSULIN (HCC): Primary | ICD-10-CM

## 2018-05-01 DIAGNOSIS — I10 ESSENTIAL HYPERTENSION, BENIGN: ICD-10-CM

## 2018-05-01 LAB — GLUCOSE POC: 259 MG/DL

## 2018-05-01 RX ORDER — PIOGLITAZONEHYDROCHLORIDE 30 MG/1
30 TABLET ORAL DAILY
Qty: 30 TAB | Refills: 5 | Status: SHIPPED | OUTPATIENT
Start: 2018-05-01 | End: 2018-09-04

## 2018-05-01 RX ORDER — PIOGLITAZONEHYDROCHLORIDE 15 MG/1
TABLET ORAL
Qty: 90 TAB | Refills: 3 | Status: CANCELLED | OUTPATIENT
Start: 2018-05-01

## 2018-05-01 RX ORDER — INSULIN PUMP SYRINGE, 3 ML
EACH MISCELLANEOUS
Qty: 1 KIT | Refills: 0 | Status: SHIPPED | OUTPATIENT
Start: 2018-05-01 | End: 2018-05-01 | Stop reason: SDUPTHER

## 2018-05-01 RX ORDER — INSULIN PUMP SYRINGE, 3 ML
EACH MISCELLANEOUS
Qty: 1 KIT | Refills: 0 | Status: SHIPPED | OUTPATIENT
Start: 2018-05-01 | End: 2018-08-27

## 2018-05-01 NOTE — PROGRESS NOTES
Chief Complaint   Patient presents with    Diabetes     1. Have you been to the ER, urgent care clinic since your last visit? Hospitalized since your last visit? No    2. Have you seen or consulted any other health care providers outside of the [facility] since your last visit? Include any pap smears or colon screening.  No  Visit Vitals    /63 (BP 1 Location: Left arm, BP Patient Position: Sitting)    Pulse 65    Resp 18    Ht 5' 3\" (1.6 m)    Wt 163 lb 12.8 oz (74.3 kg)    SpO2 98%    BMI 29.02 kg/m2

## 2018-05-01 NOTE — PROGRESS NOTES
Endocrinology Visit    Chief Complaint: Type 2 diabetes    History of Present Illness: Monalisa Adames is a 76 y.o. female who returns for type 2 diabetes mellitus. Her A1c was 9.2% in Feb 2017 but decreased to 8.3% on her last check in Nov. I did not make any medication adjustments at her last visit because her fingerstick glucose readings were at goal.    In the interim, she says her sugars have been good. Now checking sugars twice a day: fasting in the morning and 2-3 hours after dinner as I instructed her. Brings in detailed log today - most values are between 120 and 140. Current glycemic medication regimen is metformin 1000mg BID, pioglitazone 15mg daily, and glipizide XR 10mg daily. Home blood glucose monitoring frequency: twice a day, fasting morning and after dinner  Log review: 124-145 (see scanned log). POC glucose is 259 today. The patient has not had hypoglycemia. She wears a medic alert ID that says she has diabetes. She lives at [facility] but cares for herself- checks her own blood sugar and administers her medications. Reports having issues with her meter and lancing device lately (uses Prodigy brand). Known complications include neuropathy and CAD s/p CABG. She had surgery for cataracts in July 2016 (sees Dr Lory Servin). Sees Dr John Dejesus for podiatry. Last microalbumin was nonelevated at 9.1 (August 2016). Lipids in August 2016: LDL 65, HDL 46, TF 93. GFR is >60. She recently had an EGD which had benign results. Also has had a left rotator cuff injury, going to PT. Recently got a cortisone injection in her shoulder last week. Exercise is described as \"all the time\", walks up the stairs and in the house. Eats 3 meals/day. She is trying to decrease her bread and potato intake. Cooks most meals at home: baked chicken, turkey or fish, creamed potatoes from a box, lots of vegetables and greens. Eats oatmeal for breakfast. Uses sweet-n-low instead of sugar. Avoids pork and fried foods.  Drinks mostly water, no sodas (only diet if she drinks a soda). Has dessert and fruit on occasion.     Review of Systems as above, otherwise a 7 pt review is negative    Problem List:  Patient Active Problem List   Diagnosis Code    Glaucoma H40.9    GERD (gastroesophageal reflux disease) K21.9    Uncontrolled type 2 diabetes mellitus with diabetic neuropathy, without long-term current use of insulin ([facility]) E11.40, E11.65    Essential hypertension, benign I10    Hyperlipidemia E78.5    Anxiety F41.9    Osteoarthritis M19.90    Cardiac pacemaker in situ Z95.0    Diabetes ([address] E11.9       Past Medical History:    Past Medical History:   Diagnosis Date    Bradycardia     Diabetes ([address]     Hyperlipidemia     PUD (peptic ulcer disease)        Past Surgical History:  Past Surgical History:   Procedure Laterality Date    CARDIAC SURG PROCEDURE UNLIST      pacemaker, bipass    COLONOSCOPY  2011    HX BUNIONECTOMY      right foot 11/24/07    HX CATARACT REMOVAL  07/2016       Social History:  Social History     Social History    Marital status:      Spouse name: N/A    Number of children: 3    Years of education: N/A     Occupational History    retired      Social History Main Topics    Smoking status: Never Smoker    Smokeless tobacco: Never Used    Alcohol use No    Drug use: No    Sexual activity: Not Currently     Other Topics Concern    Not on file     Social History Narrative       Family History:  Family History   Problem Relation Age of Onset    Diabetes Mother     Hypertension Father        Medications:     Current Outpatient Prescriptions:     pioglitazone (ACTOS) 15 mg tablet, Take 1 tablet daily  Indications: type 2 diabetes mellitus, Disp: 90 Tab, Rfl: 3    Lancing Device misc, Check BG 1-2 times/day, Disp: 1 Each, Rfl: 0    glucose blood VI test strips (ONETOUCH VERIO) strip, Check BG 1-2 times/day, Disp: 100 Strip, Rfl: 11    metFORMIN (GLUCOPHAGE) 1,000 mg tablet, Take 1 Tab by mouth two (2) times daily (with meals). , Disp: 180 Tab, Rfl: 3    glipiZIDE SR (GLUCOTROL XL) 10 mg CR tablet, Take 1 Tab by mouth daily. Indications: type 2 diabetes mellitus, Disp: 90 Tab, Rfl: 3    fluticasone (FLONASE) 50 mcg/actuation nasal spray, , Disp: , Rfl:     sucralfate (CARAFATE) 1 gram tablet, , Disp: , Rfl:     cholecalciferol (VITAMIN D3) 400 unit tab tablet, Take  by mouth daily. , Disp: , Rfl:     ferrous sulfate (IRON) 325 mg (65 mg iron) tablet, Take  by mouth Daily (before breakfast). , Disp: , Rfl:     MYRBETRIQ 50 mg ER tablet, , Disp: , Rfl:     ELIQUIS 5 mg tablet, , Disp: , Rfl:     timolol (TIMOPTIC) 0.5 % ophthalmic solution, , Disp: , Rfl:     albuterol (PROVENTIL HFA, VENTOLIN HFA) 90 mcg/actuation inhaler, Take 2 Puffs by inhalation every four (4) hours as needed. , Disp: 1 Inhaler, Rfl: 3    simvastatin (ZOCOR) 20 mg tablet, Take 1 Tab by mouth nightly., Disp: 30 Tab, Rfl: 3    losartan (COZAAR) 50 mg tablet, Take 1 Tab by mouth daily. , Disp: 30 Tab, Rfl: 5    montelukast (SINGULAIR) 10 mg tablet, Take 10 mg by mouth daily. , Disp: , Rfl:     lactulose (ENULOSE) 10 gram/15 mL solution, Take  by mouth three (3) times daily. , Disp: , Rfl:     aspirin delayed-release 81 mg tablet, Take 81 mg by mouth daily. , Disp: , Rfl:     brinzolamide (AZOPT) 1 % ophthalmic suspension, Administer 1 Drop to both eyes three (3) times daily. , Disp: , Rfl:     latanoprost (XALATAN) 0.005 % ophthalmic solution, Administer 1 Drop to both eyes nightly., Disp: , Rfl:     hydrochlorothiazide (HYDRODIURIL) 25 mg tablet, Take 25 mg by mouth daily. , Disp: , Rfl:     acetaminophen-codeine (TYLENOL #3) 300-30 mg per tablet, , Disp: , Rfl:     clindamycin (CLEOCIN) 150 mg capsule, , Disp: , Rfl:     diazepam (VALIUM) 2 mg tablet, Take 1 Tab by mouth nightly as needed for Anxiety. , Disp: 30 Tab, Rfl: 2    Azelastine (ASTEPRO) 0.15 % (205.5 mcg) nasal spray, 2 Sprays by Both Nostrils route two (2) times a day., Disp: 1 Bottle, Rfl: 11    methenamine hippurate (HIPREX) 1 gram tablet, Take 1 g by mouth two (2) times daily (with meals). , Disp: , Rfl:     esomeprazole (NEXIUM) 40 mg capsule, Take  by mouth daily. , Disp: , Rfl:     Allergies:  No Known Allergies    Physical Examination:  Blood pressure 106/63, pulse 65, resp. rate 18, height 5' 3\" (1.6 m), weight 163 lb 12.8 oz (74.3 kg), SpO2 98 %. Body mass index is 29.02 kg/(m^2). Gen: elderly female in no acute distress  HEENT: mucous membranes moist  Thyroid: no enlargement or nodules noted  Chest: healed scar over pacemaker implanted in right upper chest  CAD: normal rate, regular rhythm. No murmur rubs or gallops  PULM: clear to ausculation, no wheezes, rhonchis or rales.   EXT: no clubbing, cyanosis or edema, wearing compression hose and diabetic shoes  Neuro: grossly non focal  Psych: pleasant, good insight into medical hx    Diabetic foot exam:   Left: Filament test normal sensation with micro filament   Pulse DP: 1+ (weak)   Pulse PT: 1+ (weak)   Deformities: None  Right: Filament test normal sensation with micro filament   Pulse DP: 1+ (weak)   Pulse PT: 1+ (weak)   Deformities: None     Clinical Data Review:     Lab Results   Component Value Date/Time    Hemoglobin A1c 8.3 (H) 11/03/2017 03:35 PM    Hemoglobin A1c 9.2 (H) 07/11/2017 03:41 PM    Hemoglobin A1c 8.7 (H) 04/04/2017 03:44 PM      Lab Results   Component Value Date/Time    Microalb/Creat ratio (ug/mg creat.) 27.8 11/03/2017 03:35 PM      Component      Latest Ref Rng & Units 11/3/2017 4/4/2017           3:34 PM  3:44 PM   Fructosamine      0 - 285 umol/L 388 (H) 353 (H)     Lab Results   Component Value Date/Time    Sodium 141 07/11/2017 03:40 PM    Potassium 4.1 07/11/2017 03:40 PM    Chloride 99 07/11/2017 03:40 PM    CO2 25 07/11/2017 03:40 PM    Glucose 92 07/11/2017 03:40 PM    BUN 17 07/11/2017 03:40 PM    Creatinine 0.76 07/11/2017 03:40 PM    BUN/Creatinine ratio 22 07/11/2017 03:40 PM    GFR est AA 87 07/11/2017 03:40 PM    GFR est non-AA 75 07/11/2017 03:40 PM      No results found for: CHOL, CHOLPOCT, CHOLX, CHLST, CHOLV, HDL, HDLPOC, LDL, LDLCPOC, LDLC, DLDLP, VLDLC, VLDL, TGLX, TRIGL, TRIGP, TGLPOCT, CHHD, CHHDX     Assessment and Plan:  Jazzy Chung is a 76 y.o. female here for type 2 diabetes with fair glycemic control on metformin, TZD, and sulfonylurea therapy. POC BG data reviewed today correlates with an A1c around 7% but her last A1c was 8.3% and fructosamine was elevated as well. Either her meter is defective, or she is confabulating her log. Will increase pioglitazone to 30mg daily. Prescribed new Prodigy meter as well. If she fails to achieve A1c under 8%, we may need to start low-dose basal insulin. Glycemic Medication Changes:  - increase pioglitazone to 30mg daily  - continue glipizide XR 10mg daily AC breakfast  - continue metformin 1000mg BID  - check BG twice a day and bring meter or log to next visit    Information required per Medicare guidelines for glucose monitoring frequency:  Due to potential for hypoglycemia in this elderly patient on sulfonylurea therapy, I recommend blood glucose monitoring at minimum twice a day. Please provide testing supplies for at least two blood glucose checks per day.     DM Health Maintenance: pertinent items updated in HM tab  A1c: update today   Cv/Lipids: on Simvastatin, lipids need to be update but pt is not fasting today  BP/Renal: BP at goal, on ARB, microalbumin UTD and nonelevaed  Vaccines: per PCP  Podiatry: no active issues, encouraged well-fitting footwear and daily inspection  Neruo: no evidence of neuropathy - foot exam updated today  Ophtho: yearly eye exam recommended  Diet and exercise: discussed healthy eating and exercise recommendations, particularly reduction in dietary carbohydrates    I spent 25 minutes with the patient today and > 50% of the time was spent counseling the patient about diabetes medications, preventative care, and dietary modification. Patient verbalized an understanding and will return to clinic in 3 months. Thank you for the opportunity to participate in this patient's care.     Ariane Warner MD  [facility]  [facility]

## 2018-05-01 NOTE — MR AVS SNAPSHOT
[address] [facility] 
[phone] Patient: Steffany Thrasher MRN:  [id number] Visit Information Date & Time Provider Department Dept. Phone Encounter #  
 5/1/2018  2:30 PM Geoff Tian, [address] [place] [address] Diabetes and Endocrinology [phone] Upcoming Health Maintenance Date Due  
 FOOT EXAM Q1 8/7/1948 DTaP/Tdap/Td series (1 - Tdap) 8/7/1959 ZOSTER VACCINE AGE 60> 6/7/1998 Bone Densitometry (Dexa) Screening 8/7/2003 LIPID PANEL Q1 11/1/2013 Pneumococcal 65+ Low/Medium Risk (2 of 2 - PPSV23) 1/28/2016 EYE EXAM RETINAL OR DILATED Q1 12/20/2017 MEDICARE YEARLY EXAM 3/14/2018 HEMOGLOBIN A1C Q6M 5/3/2018 Influenza Age 5 to Adult 8/1/2018 MICROALBUMIN Q1 11/3/2018 GLAUCOMA SCREENING Q2Y 12/20/2018 Allergies as of 5/1/2018  Review Complete On: 5/1/2018 By: Daniel Gonzáles LPN No Known Allergies Current Immunizations  Never Reviewed Name Date ZZZ-RETIRED (DO NOT USE) Pneumococcal Vaccine (Unspecified Type) 1/28/2011 Not reviewed this visit You Were Diagnosed With   
  
 Codes Comments Type 2 diabetes mellitus with other circulatory complication, without long-term current use of insulin (HCC)    -  Primary ICD-10-CM: E11.59 
ICD-9-CM: 250.70 Vitals BP Pulse Resp Height(growth percentile) Weight(growth percentile) SpO2  
 106/63 (BP 1 Location: Left arm, BP Patient Position: Sitting) 65 18 5' 3\" (1.6 m) 163 lb 12.8 oz (74.3 kg) 98% BMI OB Status Smoking Status 29.02 kg/m2 Postmenopausal Never Smoker Vitals History BMI and BSA Data Body Mass Index Body Surface Area  
 29.02 kg/m 2 1.82 m 2 Preferred Pharmacy Pharmacy Name Phone  [facility] [address] [phone] Your Updated Medication List  
  
   
 This list is accurate as of 5/1/18  3:24 PM.  Always use your most recent med list.  
  
  
  
  
 acetaminophen-codeine 300-30 mg per tablet Commonly known as:  TYLENOL #3  
  
 albuterol 90 mcg/actuation inhaler Commonly known as:  PROVENTIL HFA, VENTOLIN HFA, PROAIR HFA Take 2 Puffs by inhalation every four (4) hours as needed. aspirin delayed-release 81 mg tablet Take 81 mg by mouth daily. Azelastine 0.15 % (205.5 mcg) nasal spray Commonly known as:  ASTEPRO  
2 Sprays by Both Nostrils route two (2) times a day. AZOPT 1 % ophthalmic suspension Generic drug:  brinzolamide Administer 1 Drop to both eyes three (3) times daily. Blood-Glucose Meter monitoring kit Commonly known as:  [address] Check BG 2 times/day  
  
 clindamycin 150 mg capsule Commonly known as:  CLEOCIN  
  
 diazePAM 2 mg tablet Commonly known as:  VALIUM Take 1 Tab by mouth nightly as needed for Anxiety. ELIQUIS 5 mg tablet Generic drug:  apixaban ENULOSE 10 gram/15 mL solution Generic drug:  lactulose Take  by mouth three (3) times daily. fluticasone 50 mcg/actuation nasal spray Commonly known as:  FLONASE  
  
 glipiZIDE SR 10 mg CR tablet Commonly known as:  GLUCOTROL XL Take 1 Tab by mouth daily. Indications: type 2 diabetes mellitus  
  
 glucose blood VI test strips strip Commonly known as:  Donte Mazariegos Check BG 1-2 times/day  
  
 hydroCHLOROthiazide 25 mg tablet Commonly known as:  HYDRODIURIL Take 25 mg by mouth daily. Iron 325 mg (65 mg iron) tablet Generic drug:  ferrous sulfate Take  by mouth Daily (before breakfast). Lancing Device Misc Check BG 1-2 times/day  
  
 losartan 50 mg tablet Commonly known as:  COZAAR Take 1 Tab by mouth daily. metFORMIN 1,000 mg tablet Commonly known as:  GLUCOPHAGE Take 1 Tab by mouth two (2) times daily (with meals). methenamine hippurate 1 gram tablet Commonly known as:  HIPREX Take 1 g by mouth two (2) times daily (with meals). MYRBETRIQ 50 mg ER tablet Generic drug:  mirabegron ER NexIUM 40 mg capsule Generic drug:  esomeprazole Take  by mouth daily. pioglitazone 30 mg tablet Commonly known as:  ACTOS Take 1 Tab by mouth daily. simvastatin 20 mg tablet Commonly known as:  ZOCOR Take 1 Tab by mouth nightly. SINGULAIR 10 mg tablet Generic drug:  montelukast  
Take 10 mg by mouth daily. sucralfate 1 gram tablet Commonly known as:  CARAFATE  
  
 timolol 0.5 % ophthalmic solution Commonly known as:  TIMOPTIC  
  
 VITAMIN D3 400 unit Tab tablet Generic drug:  cholecalciferol Take  by mouth daily. XALATAN 0.005 % ophthalmic solution Generic drug:  latanoprost  
Administer 1 Drop to both eyes nightly. Prescriptions Sent to Pharmacy Refills  
 pioglitazone (ACTOS) 30 mg tablet 5 Sig: Take 1 Tab by mouth daily. Class: Normal  
 Pharmacy: [facility] Ph #: [phone] Route: Oral  
 Blood-Glucose Meter (PRODIGY AUTOCODE METER) monitoring kit 0 Sig: Check BG 2 times/day Class: Normal  
 Pharmacy: [place] [address] Ph #: [phone] We Performed the Following AMB POC GLUCOSE BLOOD, BY GLUCOSE MONITORING DEVICE [87272 CPT(R)] HEMOGLOBIN A1C WITH EAG [21319 CPT(R)]  DIABETES FOOT EXAM [7 Custom] MICROALBUMIN, UR, RAND W/ MICROALB/CREAT RATIO [id number] CPT(R)] Introducing [facility]! [employer] introduces Georgina Goodman patient portal. Now you can access parts of your medical record, email your doctor's office, and request medication refills online. 1. In your internet browser, go to [email or web address] 2. Click on the First Time User? Click Here link in the Sign In box.  You will see the New Member Sign Up page. 3. Enter your [employer] Access Code exactly as it appears below. You will not need to use this code after youve completed the sign-up process. If you do not sign up before the expiration date, you must request a new code. · [employer] Access Code: [email or web address] Expires: 7/30/2018  3:05 PM 
 
4. Enter the last four digits of your Social Security Number (xxxx) and Date of Birth (mm/dd/yyyy) as indicated and click Submit. You will be taken to the next sign-up page. 5. Create a [employer] ID. This will be your [employer]ifi login ID and cannot be changed, so think of one that is secure and easy to remember. 6. Create a [employer] password. You can change your password at any time. 7. Enter your Password Reset Question and Answer. This can be used at a later time if you forget your password. 8. Enter your e-mail address. You will receive e-mail notification when new information is available in [address]. 9. Click Sign Up. You can now view and download portions of your medical record. 10. Click the Download Summary menu link to download a portable copy of your medical information. If you have questions, please visit the Frequently Asked Questions section of the [employer] website. Remember, [employer] is NOT to be used for urgent needs. For medical emergencies, dial 911. Now available from your iPhone and Android! Please provide this summary of care documentation to your next provider. Your primary care clinician is listed as Liliya Conteh. If you have any questions after today's visit, please call [phone].

## 2018-05-02 LAB
ALBUMIN/CREAT UR: 20.7 MG/G CREAT (ref 0–30)
CREAT UR-MCNC: 264.6 MG/DL
EST. AVERAGE GLUCOSE BLD GHB EST-MCNC: 344 MG/DL
HBA1C MFR BLD: 13.6 % (ref 4.8–5.6)
MICROALBUMIN UR-MCNC: 54.7 UG/ML

## 2018-05-02 NOTE — PROGRESS NOTES
Please let her know that her A1c is very high, consistent with uncontrolled diabetes. I increased her Actos dose yesterday so will give that 3 weeks. I would like her to return on May 21st at 10:30am for blood sugar review. If no improvement, we will be starting her on insulin at that time.

## 2018-05-03 ENCOUNTER — TELEPHONE (OUTPATIENT)
Dept: ENDOCRINOLOGY | Age: 78
End: 2018-05-03

## 2018-05-03 NOTE — TELEPHONE ENCOUNTER
No, but was told by he company that the meter she currently has was sent to her in 2017 and that she will not be able to receive a new meter until 2022, but we could call [phone] to do a trouble shoot to see if the problem can be fixed over the phone.  Pt was made aware and was told to be sure to bring in her meter to her next appt on the 21st.

## 2018-05-03 NOTE — TELEPHONE ENCOUNTER
----- Message from David Muniz MD sent at 5/2/2018 12:51 PM EDT -----  Please let her know that her A1c is very high, consistent with uncontrolled diabetes. I increased her Actos dose yesterday so will give that 3 weeks. I would like her to return on May 21st at 10:30am for blood sugar review. If no improvement, we will be starting her on insulin at that time.

## 2018-05-03 NOTE — TELEPHONE ENCOUNTER
Called to make pt aware of her appt for May 21 at 10:30. I started to go over her results, but then I focused the conversation more on her coming to her appt due to pt the having a hard time comprehending the results. Pt stated that she will call to make transportation arrangements for her appt on May 21.

## 2018-05-21 ENCOUNTER — OFFICE VISIT (OUTPATIENT)
Dept: ENDOCRINOLOGY | Age: 78
End: 2018-05-21

## 2018-05-21 ENCOUNTER — TELEPHONE (OUTPATIENT)
Dept: ENDOCRINOLOGY | Age: 78
End: 2018-05-21

## 2018-05-21 VITALS
HEART RATE: 65 BPM | WEIGHT: 169 LBS | RESPIRATION RATE: 16 BRPM | BODY MASS INDEX: 29.95 KG/M2 | SYSTOLIC BLOOD PRESSURE: 113 MMHG | HEIGHT: 63 IN | DIASTOLIC BLOOD PRESSURE: 63 MMHG

## 2018-05-21 DIAGNOSIS — E78.49 OTHER HYPERLIPIDEMIA: ICD-10-CM

## 2018-05-21 DIAGNOSIS — I10 ESSENTIAL HYPERTENSION, BENIGN: ICD-10-CM

## 2018-05-21 RX ORDER — INSULIN GLARGINE 100 [IU]/ML
INJECTION, SOLUTION SUBCUTANEOUS
Qty: 5 PEN | Refills: 3 | Status: SHIPPED | OUTPATIENT
Start: 2018-05-21 | End: 2018-05-21 | Stop reason: SDUPTHER

## 2018-05-21 RX ORDER — TRAMADOL HYDROCHLORIDE 50 MG/1
TABLET ORAL
Refills: 0 | COMMUNITY
Start: 2018-05-04 | End: 2018-08-27

## 2018-05-21 RX ORDER — INSULIN GLARGINE 100 [IU]/ML
INJECTION, SOLUTION SUBCUTANEOUS
Qty: 1 PEN | Refills: 0 | Status: SHIPPED | COMMUNITY
Start: 2018-05-21 | End: 2018-05-21 | Stop reason: CLARIF

## 2018-05-21 RX ORDER — DICYCLOMINE HYDROCHLORIDE 20 MG/1
TABLET ORAL
Refills: 0 | COMMUNITY
Start: 2018-05-04 | End: 2018-09-04

## 2018-05-21 RX ORDER — PEN NEEDLE, DIABETIC 31 GX3/16"
1 NEEDLE, DISPOSABLE MISCELLANEOUS DAILY
Qty: 100 PEN NEEDLE | Refills: 3 | Status: SHIPPED | OUTPATIENT
Start: 2018-05-21 | End: 2018-07-17 | Stop reason: SDUPTHER

## 2018-05-21 RX ORDER — PEN NEEDLE, DIABETIC 31 GX3/16"
1 NEEDLE, DISPOSABLE MISCELLANEOUS DAILY
Qty: 100 PEN NEEDLE | Refills: 3 | Status: SHIPPED | OUTPATIENT
Start: 2018-05-21 | End: 2018-05-21 | Stop reason: SDUPTHER

## 2018-05-21 NOTE — PROGRESS NOTES
Endocrinology Visit    Chief Complaint: Type 2 diabetes    History of Present Illness: Suki Mays is a 76 y.o. female who returns for type 2 diabetes mellitus. Her A1c was 9.2% in Feb 2017 but decreased to 8.3% in Nov. A1c done after her last visit 3 weeks ago returned high at 13.6% so I advised her to return today to discuss medication adjustments. She brings her log and her meter today. Logbook has values in the 100s, but meter has values in the 300-400 range. When comparing the logbook to the meter, appears she has been changing the first number (ie: 337 on meter --> 137 in logbook). Current glycemic medication regimen is metformin 1000mg BID, pioglitazone 30mg daily, and glipizide XR 10mg daily. Home blood glucose monitoring frequency: twice a day, fasting morning and after dinner  The patient has not had hypoglycemia. She wears a medic alert ID that says she has diabetes. She lives at [facility] but cares for herself- checks her own blood sugar and administers her medications. Reports having issues with her meter and lancing device lately (uses Prodigy brand). Wants a new meter from her insurance. Known complications include neuropathy and CAD s/p CABG. She had surgery for cataracts in July 2016 (sees Dr Be Carlos). Sees Dr Manuel Minaya for podiatry. Last microalbumin was nonelevated. Recently got a cortisone injection in her shoulder last month. Exercise is described as \"all the time\", walks up the stairs and in the house. Eats 3 meals/day. She is trying to decrease her bread and potato intake. Cooks most meals at home: baked chicken, turkey or fish, creamed potatoes from a box, lots of vegetables and greens. Eats oatmeal for breakfast. Uses sweet-n-low instead of sugar. Avoids pork and fried foods. Drinks mostly water, no sodas (only diet if she drinks a soda). Has dessert and fruit on occasion.     Review of Systems as above, otherwise a 7 pt review is negative    Problem List:  Patient Active Problem List   Diagnosis Code    Glaucoma H40.9    GERD (gastroesophageal reflux disease) K21.9    Uncontrolled type 2 diabetes mellitus with diabetic neuropathy, without long-term current use of insulin ([facility]) E11.40, E11.65    Essential hypertension, benign I10    Hyperlipidemia E78.5    Anxiety F41.9    Osteoarthritis M19.90    Cardiac pacemaker in situ Z95.0    Diabetes ([facility] [address] E11.9       Past Medical History:    Past Medical History:   Diagnosis Date    Bradycardia     Diabetes ([facility] [address]     Hyperlipidemia     PUD (peptic ulcer disease)        Past Surgical History:  Past Surgical History:   Procedure Laterality Date    CARDIAC SURG PROCEDURE UNLIST      pacemaker, bipass    COLONOSCOPY  2011    HX BUNIONECTOMY      right foot 11/24/07    HX CATARACT REMOVAL  07/2016       Social History:  Social History     Social History    Marital status:      Spouse name: N/A    Number of children: 3    Years of education: N/A     Occupational History    retired      Social History Main Topics    Smoking status: Never Smoker    Smokeless tobacco: Never Used    Alcohol use No    Drug use: No    Sexual activity: Not Currently     Other Topics Concern    Not on file     Social History Narrative       Family History:  Family History   Problem Relation Age of Onset    Diabetes Mother     Hypertension Father        Medications:     Current Outpatient Prescriptions:     insulin glargine (LANTUS SOLOSTAR U-100 INSULIN) 100 unit/mL (3 mL) inpn, Inject 20 units daily, Disp: 1 Pen, Rfl: 0    Insulin Needles, Disposable, (ANDRÉS PEN NEEDLE) 32 gauge x 5/32\" ndle, 1 Each by Does Not Apply route daily. , Disp: 100 Pen Needle, Rfl: 3    pioglitazone (ACTOS) 30 mg tablet, Take 1 Tab by mouth daily. , Disp: 30 Tab, Rfl: 5    Blood-Glucose Meter (PRODIGY AUTOCODE METER) monitoring kit, Check BG 2 times/day, Disp: 1 Kit, Rfl: 0    Lancing Device misc, Check BG 1-2 times/day, Disp: 1 Each, Rfl: 0    glucose blood VI test strips (ONETOUCH VERIO) strip, Check BG 1-2 times/day, Disp: 100 Strip, Rfl: 11    metFORMIN (GLUCOPHAGE) 1,000 mg tablet, Take 1 Tab by mouth two (2) times daily (with meals). , Disp: 180 Tab, Rfl: 3    glipiZIDE SR (GLUCOTROL XL) 10 mg CR tablet, Take 1 Tab by mouth daily. Indications: type 2 diabetes mellitus, Disp: 90 Tab, Rfl: 3    clindamycin (CLEOCIN) 150 mg capsule, , Disp: , Rfl:     fluticasone (FLONASE) 50 mcg/actuation nasal spray, , Disp: , Rfl:     sucralfate (CARAFATE) 1 gram tablet, , Disp: , Rfl:     cholecalciferol (VITAMIN D3) 400 unit tab tablet, Take  by mouth daily. , Disp: , Rfl:     ferrous sulfate (IRON) 325 mg (65 mg iron) tablet, Take  by mouth Daily (before breakfast). , Disp: , Rfl:     MYRBETRIQ 50 mg ER tablet, , Disp: , Rfl:     ELIQUIS 5 mg tablet, , Disp: , Rfl:     timolol (TIMOPTIC) 0.5 % ophthalmic solution, , Disp: , Rfl:     albuterol (PROVENTIL HFA, VENTOLIN HFA) 90 mcg/actuation inhaler, Take 2 Puffs by inhalation every four (4) hours as needed. , Disp: 1 Inhaler, Rfl: 3    simvastatin (ZOCOR) 20 mg tablet, Take 1 Tab by mouth nightly., Disp: 30 Tab, Rfl: 3    diazepam (VALIUM) 2 mg tablet, Take 1 Tab by mouth nightly as needed for Anxiety. , Disp: 30 Tab, Rfl: 2    Azelastine (ASTEPRO) 0.15 % (205.5 mcg) nasal spray, 2 Sprays by Both Nostrils route two (2) times a day., Disp: 1 Bottle, Rfl: 11    losartan (COZAAR) 50 mg tablet, Take 1 Tab by mouth daily. , Disp: 30 Tab, Rfl: 5    montelukast (SINGULAIR) 10 mg tablet, Take 10 mg by mouth daily. , Disp: , Rfl:     lactulose (ENULOSE) 10 gram/15 mL solution, Take  by mouth three (3) times daily. , Disp: , Rfl:     esomeprazole (NEXIUM) 40 mg capsule, Take  by mouth daily. , Disp: , Rfl:     aspirin delayed-release 81 mg tablet, Take 81 mg by mouth daily. , Disp: , Rfl:     brinzolamide (AZOPT) 1 % ophthalmic suspension, Administer 1 Drop to both eyes three (3) times daily. , Disp: , Rfl:     latanoprost (XALATAN) 0.005 % ophthalmic solution, Administer 1 Drop to both eyes nightly., Disp: , Rfl:     hydrochlorothiazide (HYDRODIURIL) 25 mg tablet, Take 25 mg by mouth daily. , Disp: , Rfl:     dicyclomine (BENTYL) 20 mg tablet, TAKE 1 TABLET BY MOUTH FOUR TIMES A DAY, Disp: , Rfl: 0    traMADol (ULTRAM) 50 mg tablet, TAKE 1 TABLET BY MOUTH EVERY 8 HOURS AS NEEDED, Disp: , Rfl: 0    acetaminophen-codeine (TYLENOL #3) 300-30 mg per tablet, , Disp: , Rfl:     methenamine hippurate (HIPREX) 1 gram tablet, Take 1 g by mouth two (2) times daily (with meals). , Disp: , Rfl:     Allergies:  No Known Allergies    Physical Examination:  Blood pressure 113/63, pulse 65, resp. rate 16, height 5' 3\" (1.6 m), weight 169 lb (76.7 kg). Body mass index is 29.94 kg/(m^2). Gen: elderly female in no acute distress  HEENT: mucous membranes moist  Thyroid: no enlargement or nodules noted  Chest: healed scar over pacemaker implanted in right upper chest  CAD: normal rate, regular rhythm. No murmur rubs or gallops  PULM: clear to ausculation, no wheezes, rhonchis or rales.   EXT: no clubbing, cyanosis or edema, wearing compression hose and diabetic shoes  Neuro: grossly non focal  Psych: pleasant, fair insight into medical hx      Clinical Data Review:     Lab Results   Component Value Date/Time    Hemoglobin A1c 13.6 (H) 05/01/2018 04:00 PM    Hemoglobin A1c 8.3 (H) 11/03/2017 03:35 PM    Hemoglobin A1c 9.2 (H) 07/11/2017 03:41 PM      Lab Results   Component Value Date/Time    Microalb/Creat ratio (ug/mg creat.) 20.7 05/01/2018 04:00 PM      Lab Results   Component Value Date/Time    Sodium 141 07/11/2017 03:40 PM    Potassium 4.1 07/11/2017 03:40 PM    Chloride 99 07/11/2017 03:40 PM    CO2 25 07/11/2017 03:40 PM    Glucose 92 07/11/2017 03:40 PM    BUN 17 07/11/2017 03:40 PM    Creatinine 0.76 07/11/2017 03:40 PM    BUN/Creatinine ratio 22 07/11/2017 03:40 PM    GFR est AA 87 07/11/2017 03:40 PM    GFR est non-AA 75 07/11/2017 03:40 PM      No results found for: CHOL, CHOLPOCT, CHOLX, CHLST, CHOLV, HDL, HDLPOC, LDL, LDLCPOC, LDLC, DLDLP, VLDLC, VLDL, TGLX, TRIGL, TRIGP, TGLPOCT, CHHD, CHHDX     Assessment and Plan:  Monalisa Adames is a 76 y.o. female here for type 2 diabetes, now with suboptimal glycemic control on metformin, TZD, and sulfonylurea therapy. POC BG data reviewed today correlates with an A1c around 7% but meter and A1c suggest an average in the 300s. Discussed the necessity of starting basal insulin and demonstrated injection technique in clinic today (I showed her and my nurse also did teaching/hands on demo afterward). We discussed how to recognize and treat hypoglycemia - she knows to notify me for blood sugars below 80. Glycemic Medication Changes:  - start Lantus 20 units Qam  - continue pioglitazone 30mg daily  - continue glipizide XR 10mg daily AC breakfast  - continue metformin 1000mg BID  - check BG twice a day and bring meter or log to next visit    Information required per Medicare guidelines for glucose monitoring frequency:  Due to potential for hypoglycemia in this elderly patient on sulfonylurea and insulin therapy, I recommend blood glucose monitoring at minimum twice a day. Please provide testing supplies for at least two blood glucose checks per day.     DM Health Maintenance: pertinent items updated in HM tab  A1c: update at next visit in 3 months  Cv/Lipids: on Simvastatin, lipids need to be update but pt is not fasting today  BP/Renal: BP at goal, on ARB, microalbumin UTD and nonelevaed  Vaccines: per PCP  Podiatry: no active issues, encouraged well-fitting footwear and daily inspection  Neruo: no evidence of neuropathy - foot exam UTD  Ophtho: yearly eye exam recommended  Diet and exercise: discussed healthy eating and exercise recommendations, particularly reduction in dietary carbohydrates    I spent 25 minutes with the patient today and > 50% of the time was spent counseling the patient about diabetes medications, preventative care, and dietary modification. Patient verbalized an understanding and will return to clinic in 3 months. Thank you for the opportunity to participate in this patient's care.     Lieutenant Manny MD  [facility] Diabetes & Endocrinology  [place]

## 2018-05-21 NOTE — PROGRESS NOTES
Lab Results   Component Value Date/Time    Hemoglobin A1c 13.6 (H) 05/01/2018 04:00 PM    Hemoglobin A1c 8.3 (H) 11/03/2017 03:35 PM    Hemoglobin A1c 9.2 (H) 07/11/2017 03:41 PM

## 2018-05-21 NOTE — MR AVS SNAPSHOT
[address] [facility] [address] [place] 
[phone] Patient: Genoveva Fernandez MRN:  [id number] Visit Information Date & Time Provider Department Dept. Phone Encounter #  
 5/21/2018 10:30 AM MD [facility] [phone] Your Appointments 8/23/2018  2:10 PM  
ROUTINE CARE with MD [facility] Appt Note: f/u diabetes cp0.00  
 [address] [facility] [address] [place]  
[id number] [place] [id number] Upcoming Health Maintenance Date Due DTaP/Tdap/Td series (1 - Tdap) 8/7/1961 ZOSTER VACCINE AGE 60> 6/7/1998 Bone Densitometry (Dexa) Screening 8/7/2003 LIPID PANEL Q1 11/1/2013 Pneumococcal 65+ Low/Medium Risk (2 of 2 - PPSV23) 1/28/2016 EYE EXAM RETINAL OR DILATED Q1 12/20/2017 MEDICARE YEARLY EXAM 3/14/2018 Influenza Age 5 to Adult 8/1/2018 HEMOGLOBIN A1C Q6M 11/1/2018 GLAUCOMA SCREENING Q2Y 12/20/2018 FOOT EXAM Q1 5/1/2019 MICROALBUMIN Q1 5/1/2019 Allergies as of 5/21/2018  Review Complete On: 5/21/2018 By: Jody Harman No Known Allergies Current Immunizations  Never Reviewed Name Date ZZZ-RETIRED (DO NOT USE) Pneumococcal Vaccine (Unspecified Type) 1/28/2011 Not reviewed this visit Vitals BP Pulse Resp Height(growth percentile) Weight(growth percentile) BMI  
 113/63 65 16 5' 3\" (1.6 m) 169 lb (76.7 kg) 29.94 kg/m2 OB Status Smoking Status Postmenopausal Never Smoker Vitals History BMI and BSA Data Body Mass Index Body Surface Area  
 29.94 kg/m 2 1.85 m 2 Preferred Pharmacy Pharmacy Name Phone [id number], [address] [place] [address] [phone] Your Updated Medication List  
  
   
This list is accurate as of 5/21/18 11:07 AM.  Always use your most recent med list.  
  
  
  
  
 acetaminophen-codeine 300-30 mg per tablet Commonly known as:  TYLENOL #3  
  
 albuterol 90 mcg/actuation inhaler Commonly known as:  PROVENTIL HFA, VENTOLIN HFA, PROAIR HFA Take 2 Puffs by inhalation every four (4) hours as needed. aspirin delayed-release 81 mg tablet Take 81 mg by mouth daily. Azelastine 0.15 % (205.5 mcg) nasal spray Commonly known as:  ASTEPRO  
2 Sprays by Both Nostrils route two (2) times a day. AZOPT 1 % ophthalmic suspension Generic drug:  brinzolamide Administer 1 Drop to both eyes three (3) times daily. Blood-Glucose Meter monitoring kit Commonly known as:  [address] Check BG 2 times/day  
  
 clindamycin 150 mg capsule Commonly known as:  CLEOCIN  
  
 diazePAM 2 mg tablet Commonly known as:  VALIUM Take 1 Tab by mouth nightly as needed for Anxiety. dicyclomine 20 mg tablet Commonly known as:  BENTYL TAKE 1 TABLET BY MOUTH FOUR TIMES A DAY  
  
 ELIQUIS 5 mg tablet Generic drug:  apixaban ENULOSE 10 gram/15 mL solution Generic drug:  lactulose Take  by mouth three (3) times daily. fluticasone 50 mcg/actuation nasal spray Commonly known as:  FLONASE  
  
 glipiZIDE SR 10 mg CR tablet Commonly known as:  GLUCOTROL XL Take 1 Tab by mouth daily. Indications: type 2 diabetes mellitus  
  
 glucose blood VI test strips strip Commonly known as:  Wolm Litten Check BG 1-2 times/day  
  
 hydroCHLOROthiazide 25 mg tablet Commonly known as:  HYDRODIURIL Take 25 mg by mouth daily. insulin glargine 100 unit/mL (3 mL) Inpn Commonly known as:  LANTUS SOLOSTAR U-100 INSULIN Inject 20 units daily Iron 325 mg (65 mg iron) tablet Generic drug:  ferrous sulfate Take  by mouth Daily (before breakfast). Lancing Device Misc Check BG 1-2 times/day  
  
 losartan 50 mg tablet Commonly known as:  COZAAR Take 1 Tab by mouth daily. metFORMIN 1,000 mg tablet Commonly known as:  GLUCOPHAGE Take 1 Tab by mouth two (2) times daily (with meals). methenamine hippurate 1 gram tablet Commonly known as:  HIPREX Take 1 g by mouth two (2) times daily (with meals). MYRBETRIQ 50 mg ER tablet Generic drug:  mirabegron ER NexIUM 40 mg capsule Generic drug:  esomeprazole Take  by mouth daily. pioglitazone 30 mg tablet Commonly known as:  ACTOS Take 1 Tab by mouth daily. simvastatin 20 mg tablet Commonly known as:  ZOCOR Take 1 Tab by mouth nightly. SINGULAIR 10 mg tablet Generic drug:  montelukast  
Take 10 mg by mouth daily. sucralfate 1 gram tablet Commonly known as:  CARAFATE  
  
 timolol 0.5 % ophthalmic solution Commonly known as:  TIMOPTIC  
  
 traMADol 50 mg tablet Commonly known as:  ULTRAM  
TAKE 1 TABLET BY MOUTH EVERY 8 HOURS AS NEEDED  
  
 VITAMIN D3 400 unit Tab tablet Generic drug:  cholecalciferol Take  by mouth daily. XALATAN 0.005 % ophthalmic solution Generic drug:  latanoprost  
Administer 1 Drop to both eyes nightly. Patient Instructions Diabetes Medications: 
- start LANTUS insulin 20 units every morning 
- continue Actos (pioglitazone) 30mg daily 
- continue taking Glipizide 10mg every morning 
- continue taking metformin 1000mg twice a day Twice a week: check your blood sugar 1-2 hours after a dinner Goal blood sugars: 100-199 Call me if you have blood sugars less than 80 Introducing [facility]! [employer] introduces [employer]mair patient portal. Now you can access parts of your medical record, email your doctor's office, and request medication refills online. 1. In your internet browser, go to [email or web address] 2. Click on the First Time User? Click Here link in the Sign In box.  You will see the New Member Sign Up page. 3. Enter your [employer] Access Code exactly as it appears below. You will not need to use this code after youve completed the sign-up process. If you do not sign up before the expiration date, you must request a new code. · [employer] Access Code: [email or web address] Expires: 7/30/2018  3:05 PM 
 
4. Enter the last four digits of your Social Security Number (xxxx) and Date of Birth (mm/dd/yyyy) as indicated and click Submit. You will be taken to the next sign-up page. 5. Create a [employer] ID. This will be your [employer] login ID and cannot be changed, so think of one that is secure and easy to remember. 6. Create a [employer] password. You can change your password at any time. 7. Enter your Password Reset Question and Answer. This can be used at a later time if you forget your password. 8. Enter your e-mail address. You will receive e-mail notification when new information is available in [address]. 9. Click Sign Up. You can now view and download portions of your medical record. 10. Click the Download Summary menu link to download a portable copy of your medical information. If you have questions, please visit the Frequently Asked Questions section of the [employer] website. Remember, [employer] is NOT to be used for urgent needs. For medical emergencies, dial 911. Now available from your iPhone and Android! Please provide this summary of care documentation to your next provider. Your primary care clinician is listed as Hiro Neville. If you have any questions after today's visit, please call [phone].

## 2018-05-21 NOTE — TELEPHONE ENCOUNTER
Okay, I will change it. I'm worried this will confuse her though. Will you call and explain the switch? She can finish the sample pen I gave her, then switch lantus to levemir (same dose of 20 units).

## 2018-05-21 NOTE — PATIENT INSTRUCTIONS
Diabetes Medications:  - start LANTUS insulin 20 units every morning  - continue Actos (pioglitazone) 30mg daily  - continue taking Glipizide 10mg every morning  - continue taking metformin 1000mg twice a day    Twice a week: check your blood sugar 1-2 hours after a dinner  Goal blood sugars: 100-199  Call me if you have blood sugars less than 80

## 2018-05-22 ENCOUNTER — TELEPHONE (OUTPATIENT)
Dept: ENDOCRINOLOGY | Age: 78
End: 2018-05-22

## 2018-05-22 NOTE — TELEPHONE ENCOUNTER
----- Message from Olga Estevez sent at 5/22/2018  8:46 AM EDT -----  Regarding: Dr. Zeus Jc  The patient is requesting a call back from the doctor or nurse to discuss the insulin that she was given yesterday.  ([phone]

## 2018-05-22 NOTE — TELEPHONE ENCOUNTER
Pt was made aware to start the next dose of insulin tomorrow. Pt voiced understanding. Pt stated that her pharmacist stated that she will show her how to use the insulin pen again when she comes to  her prescriptions.

## 2018-05-22 NOTE — TELEPHONE ENCOUNTER
Chito Vásquez! Ms. Norlin Anton called and said that she is confused on how to take her Insulin medication and would like for you to call her when you have a moment. Thanks Nasrin Valencia.

## 2018-05-22 NOTE — TELEPHONE ENCOUNTER
Pt said she went to [facility] and the pharmacist showed her again how to take her insulin. Pt stated that thinks she understands but will call tomorrow if needed.

## 2018-05-22 NOTE — TELEPHONE ENCOUNTER
Pt stated that the insulin spilled while she was injecting herself and wanted to know if she will need to inject herself again. Pt stated that she was able to get some of the insulin in her, just not all of it. I asked pt to check her bs and her meter stated that her bs was 129.

## 2018-05-23 ENCOUNTER — TELEPHONE (OUTPATIENT)
Dept: ENDOCRINOLOGY | Age: 78
End: 2018-05-23

## 2018-05-23 NOTE — TELEPHONE ENCOUNTER
Called and spoke to pt to make sure she wasn't having any problems with taking her insulin this.  Pt stated that her fasting bs was 188

## 2018-05-23 NOTE — TELEPHONE ENCOUNTER
----- Message from Aisha Cook sent at 5/23/2018  8:07 AM EDT -----  Regarding: Dr. Jessica Rivera  Pt requested a call back from \"Abraham\" regarding Insulin. No additional information was disclosed. Pt best contact [phone].

## 2018-05-30 ENCOUNTER — TELEPHONE (OUTPATIENT)
Dept: ENDOCRINOLOGY | Age: 78
End: 2018-05-30

## 2018-05-30 NOTE — TELEPHONE ENCOUNTER
Please let her know that her sugars are much better but are getting too low. I recommend she reduce her Levemir dose to 14 units daily.

## 2018-05-30 NOTE — TELEPHONE ENCOUNTER
Okay, see if she can come this afternoon or first thing tomorrow morning before she takes her next insulin dose. In the meantime, if she has another blood sugar less than 70 she should treat with 4 glucose tabs or 4 oz of juice and recheck her sugar in 15 minutes. Thank you.

## 2018-05-30 NOTE — TELEPHONE ENCOUNTER
Pt will need to come in for a face to face nurse visit so that I can show her how to dial her Lantus pen up to 14. I tried to explain to her the new insulin dose, but she was unable to comprehend.      Please advise

## 2018-05-30 NOTE — TELEPHONE ENCOUNTER
----- Message from Kaylyn Johnson sent at 5/30/2018 10:03 AM EDT -----  Regarding: Dr Malinda Lundberg would like a call back from the nurse regarding the insulin that was prescribed on 5/21/18. Pt can be reached at [phone].

## 2018-05-30 NOTE — TELEPHONE ENCOUNTER
Returned Mrs. Eric Collins call in regards to her concern with her insulin that she was prescribed on 5/21/18. Mrs. Eric Collins wanted to know if her insulin is the reason why her blood sugar readings are going up and down. She stated her blood sugar readings were as followed: May 27th: 210  May 28th: 118  May 29th: 81(morning) 66 (afternoon)  May 30th: 59      Mrs. Eric Collins would like a call back from Dr. Colleen Sterling. She can be reached at [phone].

## 2018-05-31 NOTE — TELEPHONE ENCOUNTER
Pt stated that her blood sugar this morning was 84 and after she got off the phone with me she checked her bs and it was 98. Is still taking the 20 units, but stated that when she goes to her doctor appt today, she will ask  the nurse to show her how to decrease her insulin to 14 units.

## 2018-06-07 ENCOUNTER — TELEPHONE (OUTPATIENT)
Dept: ENDOCRINOLOGY | Age: 78
End: 2018-06-07

## 2018-06-07 NOTE — TELEPHONE ENCOUNTER
Pt stated that since the decrease in her Lantus, (14 units daily) her bs are now running in the 190s. Pt stated that she thinks she should have stayed with taking 20. Dr Fabiola Cruz was made aware and a verbal order was given to increase the Lantus to 18 units daily and  to be sure to call the office if her bs drops below 80.

## 2018-06-25 ENCOUNTER — TELEPHONE (OUTPATIENT)
Dept: ENDOCRINOLOGY | Age: 78
End: 2018-06-25

## 2018-06-25 NOTE — TELEPHONE ENCOUNTER
6/25/2018  12:07 PM        Ms. Isra Albarran would like to know if she can come on July 17 before she leaves to head out of town.         Thanks

## 2018-07-17 ENCOUNTER — OFFICE VISIT (OUTPATIENT)
Dept: ENDOCRINOLOGY | Age: 78
End: 2018-07-17

## 2018-07-17 VITALS
SYSTOLIC BLOOD PRESSURE: 131 MMHG | OXYGEN SATURATION: 99 % | WEIGHT: 164.8 LBS | BODY MASS INDEX: 29.2 KG/M2 | HEART RATE: 65 BPM | RESPIRATION RATE: 16 BRPM | DIASTOLIC BLOOD PRESSURE: 56 MMHG | HEIGHT: 63 IN

## 2018-07-17 DIAGNOSIS — E78.49 OTHER HYPERLIPIDEMIA: ICD-10-CM

## 2018-07-17 DIAGNOSIS — I10 ESSENTIAL HYPERTENSION, BENIGN: ICD-10-CM

## 2018-07-17 RX ORDER — GLIPIZIDE 10 MG/1
10 TABLET, FILM COATED, EXTENDED RELEASE ORAL DAILY
Qty: 90 TAB | Refills: 3 | Status: SHIPPED | OUTPATIENT
Start: 2018-07-17 | End: 2018-09-04

## 2018-07-17 RX ORDER — LANCING DEVICE/LANCETS
KIT MISCELLANEOUS
Qty: 1 KIT | Refills: 0 | Status: SHIPPED | OUTPATIENT
Start: 2018-07-17 | End: 2018-08-27

## 2018-07-17 RX ORDER — PEN NEEDLE, DIABETIC 31 GX3/16"
1 NEEDLE, DISPOSABLE MISCELLANEOUS DAILY
Qty: 100 PEN NEEDLE | Refills: 3 | Status: SHIPPED | OUTPATIENT
Start: 2018-07-17 | End: 2018-08-27

## 2018-07-17 NOTE — PATIENT INSTRUCTIONS
Diabetes Medications:  - continue Levemir insulin 18 units every morning  - continue Actos (pioglitazone) 30mg daily  - continue taking Glipizide 10mg every morning  - continue taking metformin 1000mg (two 500mg tablets) twice a day    Twice a week: check your blood sugar 1-2 hours after a dinner  Goal blood sugars: 100-199  Call me if you have blood sugars less than 80

## 2018-07-17 NOTE — PROGRESS NOTES
Endocrinology Visit    Chief Complaint: Type 2 diabetes    History of Present Illness: Gabriel Rosales is a 76 y.o. female who returns for type 2 diabetes mellitus. Her A1c was 9.2% in Feb 2017 but decreased to 8.3% in Nov. A1c done in May returned high at 13.6% so I started her on basal insulin - initially Lantus at 20 units daily but her insurance dictated a switch to Levemir. She has called with blood sugars in the interim and I decreased her dose to 14 units daily, I later increased it to 18 units due to high blood sugars. She brings her log and her meter today. Current glycemic medication regimen is Levemir 18 units daily, metformin 1000mg BID, pioglitazone 30mg daily, and glipizide XR 10mg daily. She has not been taking the glipizide lately - says she ran out of it (although she has an active prescription from me)  Home blood glucose monitoring frequency: twice a day, fasting morning and after dinner  BG range:  (higher values in the past month since she has been off glipizide)  The patient has not had hypoglycemia. She wears a medic alert ID that says she has diabetes. She lives at [facility] but cares for herself- checks her own blood sugar and administers her medications. Asks for a new lancet device. Known complications include neuropathy and CAD s/p CABG. She had surgery for cataracts in July 2016 (sees Dr Deb Sullivan). Sees Dr Drake Alvarado for podiatry. Having a stress test in a few weeks. Last microalbumin was nonelevated. Exercise is described as \"all the time\", walks up the stairs and in the house. Eats 3 meals/day. She is trying to decrease her bread and potato intake. Cooks most meals at home: baked chicken, turkey or fish, creamed potatoes from a box, lots of vegetables and greens. Eats oatmeal for breakfast. Uses sweet-n-low instead of sugar. Avoids pork and fried foods. Drinks mostly water, no sodas (only diet if she drinks a soda).  Has dessert on occasion but admits she has been eating a lot of fruit lately: bananas, grapes, apples, and watermelon. Review of Systems as above, otherwise a 7 pt review is negative    Problem List:  Patient Active Problem List   Diagnosis Code    Glaucoma H40.9    GERD (gastroesophageal reflux disease) K21.9    Uncontrolled type 2 diabetes mellitus with diabetic neuropathy, with long-term current use of insulin ([facility]) E11.40, Z79.4, E11.65    Essential hypertension, benign I10    Hyperlipidemia E78.5    Anxiety F41.9    Osteoarthritis M19.90    Cardiac pacemaker in situ Z95.0    Diabetes ([address] E11.9       Past Medical History:    Past Medical History:   Diagnosis Date    Bradycardia     Diabetes ([address]     Hyperlipidemia     PUD (peptic ulcer disease)        Past Surgical History:  Past Surgical History:   Procedure Laterality Date    CARDIAC SURG PROCEDURE UNLIST      pacemaker, bipass    COLONOSCOPY  2011    HX BUNIONECTOMY      right foot 11/24/07    HX CATARACT REMOVAL  07/2016       Social History:  Social History     Social History    Marital status:      Spouse name: N/A    Number of children: 3    Years of education: N/A     Occupational History    retired      Social History Main Topics    Smoking status: Never Smoker    Smokeless tobacco: Never Used    Alcohol use No    Drug use: No    Sexual activity: Not Currently     Other Topics Concern    Not on file     Social History Narrative       Family History:  Family History   Problem Relation Age of Onset    Diabetes Mother     Hypertension Father        Medications:     Current Outpatient Prescriptions:     linaclotide (LINZESS PO), Take  by mouth., Disp: , Rfl:     Insulin Needles, Disposable, (ANDRÉS PEN NEEDLE) 32 gauge x 5/32\" ndle, 1 Each by Does Not Apply route daily. E11.40 Pleaes fill today. , Disp: 100 Pen Needle, Rfl: 3    insulin detemir U-100 (LEVEMIR FLEXTOUCH) 100 unit/mL (3 mL) inpn, Inject 20 units daily, Disp: 5 Pen, Rfl: 3    pioglitazone (ACTOS) 30 mg tablet, Take 1 Tab by mouth daily. , Disp: 30 Tab, Rfl: 5    Blood-Glucose Meter (PRODIGY AUTOCODE METER) monitoring kit, Check BG 2 times/day, Disp: 1 Kit, Rfl: 0    Lancing Device misc, Check BG 1-2 times/day, Disp: 1 Each, Rfl: 0    glucose blood VI test strips (ONETOUCH VERIO) strip, Check BG 1-2 times/day, Disp: 100 Strip, Rfl: 11    metFORMIN (GLUCOPHAGE) 1,000 mg tablet, Take 1 Tab by mouth two (2) times daily (with meals). , Disp: 180 Tab, Rfl: 3    acetaminophen-codeine (TYLENOL #3) 300-30 mg per tablet, , Disp: , Rfl:     fluticasone (FLONASE) 50 mcg/actuation nasal spray, , Disp: , Rfl:     sucralfate (CARAFATE) 1 gram tablet, , Disp: , Rfl:     cholecalciferol (VITAMIN D3) 400 unit tab tablet, Take  by mouth daily. , Disp: , Rfl:     ferrous sulfate (IRON) 325 mg (65 mg iron) tablet, Take  by mouth Daily (before breakfast). , Disp: , Rfl:     MYRBETRIQ 50 mg ER tablet, , Disp: , Rfl:     ELIQUIS 5 mg tablet, , Disp: , Rfl:     timolol (TIMOPTIC) 0.5 % ophthalmic solution, , Disp: , Rfl:     albuterol (PROVENTIL HFA, VENTOLIN HFA) 90 mcg/actuation inhaler, Take 2 Puffs by inhalation every four (4) hours as needed. , Disp: 1 Inhaler, Rfl: 3    losartan (COZAAR) 50 mg tablet, Take 1 Tab by mouth daily. , Disp: 30 Tab, Rfl: 5    methenamine hippurate (HIPREX) 1 gram tablet, Take 1 g by mouth two (2) times daily (with meals). , Disp: , Rfl:     aspirin delayed-release 81 mg tablet, Take 81 mg by mouth daily. , Disp: , Rfl:     brinzolamide (AZOPT) 1 % ophthalmic suspension, Administer 1 Drop to both eyes three (3) times daily. , Disp: , Rfl:     latanoprost (XALATAN) 0.005 % ophthalmic solution, Administer 1 Drop to both eyes nightly., Disp: , Rfl:     hydrochlorothiazide (HYDRODIURIL) 25 mg tablet, Take 25 mg by mouth daily. , Disp: , Rfl:     dicyclomine (BENTYL) 20 mg tablet, TAKE 1 TABLET BY MOUTH FOUR TIMES A DAY, Disp: , Rfl: 0    traMADol (ULTRAM) 50 mg tablet, TAKE 1 TABLET BY MOUTH EVERY 8 HOURS AS NEEDED, Disp: , Rfl: 0    glipiZIDE SR (GLUCOTROL XL) 10 mg CR tablet, Take 1 Tab by mouth daily. Indications: type 2 diabetes mellitus, Disp: 90 Tab, Rfl: 3    clindamycin (CLEOCIN) 150 mg capsule, , Disp: , Rfl:     simvastatin (ZOCOR) 20 mg tablet, Take 1 Tab by mouth nightly., Disp: 30 Tab, Rfl: 3    diazepam (VALIUM) 2 mg tablet, Take 1 Tab by mouth nightly as needed for Anxiety. , Disp: 30 Tab, Rfl: 2    Azelastine (ASTEPRO) 0.15 % (205.5 mcg) nasal spray, 2 Sprays by Both Nostrils route two (2) times a day., Disp: 1 Bottle, Rfl: 11    montelukast (SINGULAIR) 10 mg tablet, Take 10 mg by mouth daily. , Disp: , Rfl:     lactulose (ENULOSE) 10 gram/15 mL solution, Take  by mouth three (3) times daily. , Disp: , Rfl:     esomeprazole (NEXIUM) 40 mg capsule, Take  by mouth daily. , Disp: , Rfl:     Allergies:  No Known Allergies    Physical Examination:  Blood pressure 131/56, pulse 65, resp. rate 16, height 5' 3\" (1.6 m), weight 164 lb 12.8 oz (74.8 kg), SpO2 99 %. Body mass index is 29.19 kg/(m^2). Gen: elderly female in no acute distress  HEENT: mucous membranes moist  Thyroid: no enlargement or nodules noted  Chest: healed scar over pacemaker implanted in right upper chest  CAD: normal rate, regular rhythm. No murmur rubs or gallops  PULM: clear to ausculation, no wheezes, rhonchis or rales.   EXT: wearing compression hose and diabetic shoes  Neuro: grossly non focal  Psych: pleasant, fair insight into medical hx      Clinical Data Review:     Lab Results   Component Value Date/Time    Hemoglobin A1c 13.6 (H) 05/01/2018 04:00 PM    Hemoglobin A1c 8.3 (H) 11/03/2017 03:35 PM    Hemoglobin A1c 9.2 (H) 07/11/2017 03:41 PM      Lab Results   Component Value Date/Time    Microalb/Creat ratio (ug/mg creat.) 20.7 05/01/2018 04:00 PM      Lab Results   Component Value Date/Time    Sodium 141 07/11/2017 03:40 PM    Potassium 4.1 07/11/2017 03:40 PM    Chloride 99 07/11/2017 03:40 PM    CO2 25 07/11/2017 03:40 PM    Glucose 92 07/11/2017 03:40 PM    BUN 17 07/11/2017 03:40 PM    Creatinine 0.76 07/11/2017 03:40 PM    BUN/Creatinine ratio 22 07/11/2017 03:40 PM    GFR est AA 87 07/11/2017 03:40 PM    GFR est non-AA 75 07/11/2017 03:40 PM      No results found for: CHOL, CHOLPOCT, CHOLX, CHLST, CHOLV, HDL, HDLPOC, LDL, LDLCPOC, LDLC, DLDLP, VLDLC, VLDL, TGLX, TRIGL, TRIGP, TGLPOCT, CHHD, CHHDX     Assessment and Plan:  Altagracia Sweeney is a 76 y.o. female here for type 2 diabetes, control of which worsened despite metformin, TZD, and sulfonylurea therapy. I added basal insulin in May and glucose levels appear to be improving but she is still having post-prandial hyperglycemia lately, likely due to lack of glipizide. Recommend resuming glipizide and reducing her fruit intake. We discussed how to recognize and treat hypoglycemia - she knows to notify me for blood sugars below 80. Glycemic Medication Changes:  - continue Lantus 18 units Qam  - continue pioglitazone 30mg daily  - resume glipizide XR 10mg daily AC breakfast  - continue metformin 1000mg BID  - check BG twice a day and bring meter or log to next visit    Information required per Medicare guidelines for glucose monitoring frequency:  Due to potential for hypoglycemia in this elderly patient on sulfonylurea and insulin therapy, I recommend blood glucose monitoring at minimum twice a day. Please provide testing supplies for at least two blood glucose checks per day.     DM Health Maintenance: pertinent items updated in HM tab  A1c: update today  Cv/Lipids: on Simvastatin, update lipids today  BP/Renal: BP at goal, on ARB, microalbumin UTD and nonelevaed  Vaccines: per PCP  Podiatry: no active issues, encouraged well-fitting footwear and daily inspection  Neruo: no evidence of neuropathy - foot exam UTD  Ophtho: yearly eye exam recommended  Diet and exercise: discussed healthy eating and exercise recommendations, particularly reduction in dietary carbohydrates    I spent 25 minutes with the patient today and > 50% of the time was spent counseling the patient about diabetes medications, preventative care, and dietary modification. Patient verbalized an understanding and will return to clinic in 3 months. Thank you for the opportunity to participate in this patient's care.     Feliciano Brunner MD  [facility]  [facility]

## 2018-07-17 NOTE — PROGRESS NOTES
Roxane Smalls is a 76 y.o. female  Chief Complaint   Patient presents with    Diabetes     follow up      1. Have you been to the ER, urgent care clinic since your last visit? Hospitalized since your last visit? No    2. Have you seen or consulted any other health care providers outside of the [address] [place] [address] since your last visit? Include any pap smears or colon screening.  Yes, PCP and Cardiologist.      Visit Vitals    /56 (BP 1 Location: Right arm, BP Patient Position: Sitting)    Pulse 65    Resp 16    Ht 5' 3\" (1.6 m)    Wt 164 lb 12.8 oz (74.8 kg)    SpO2 99%    BMI 29.19 kg/m2

## 2018-07-17 NOTE — MR AVS SNAPSHOT
[address] [facility] [address] [facility] [place] 
[phone] Patient: Sarbjit Peterson MRN:  [id number] Visit Information Date & Time Provider Department Dept. Phone Encounter #  
 7/17/2018 11:50 AM Alicia Lee, [address] [place] [address] Diabetes and Endocrinology [phone] [id number] Your Appointments 8/23/2018  2:10 PM  
ROUTINE CARE with Alicia Lee MD  
[place] [facility]) Appt Note: f/u diabetes cp0.00  
 [address] [facility] [address] [facility] [place]  
[address] [id number] [place] [address] Upcoming Health Maintenance Date Due DTaP/Tdap/Td series (1 - Tdap) 8/7/1959 ZOSTER VACCINE AGE 60> 6/7/1998 Bone Densitometry (Dexa) Screening 8/7/2003 LIPID PANEL Q1 11/1/2013 Pneumococcal 65+ Low/Medium Risk (2 of 2 - PPSV23) 1/28/2016 EYE EXAM RETINAL OR DILATED Q1 12/20/2017 MEDICARE YEARLY EXAM 3/14/2018 Influenza Age 5 to Adult 8/1/2018 HEMOGLOBIN A1C Q6M 11/1/2018 GLAUCOMA SCREENING Q2Y 12/20/2018 FOOT EXAM Q1 5/1/2019 MICROALBUMIN Q1 5/1/2019 Allergies as of 7/17/2018  Review Complete On: 7/17/2018 By: Valerie Carrillo LPN No Known Allergies Current Immunizations  Never Reviewed Name Date ZZZ-RETIRED (DO NOT USE) Pneumococcal Vaccine (Unspecified Type) 1/28/2011 Not reviewed this visit You Were Diagnosed With   
  
 Codes Comments Uncontrolled type 2 diabetes mellitus with diabetic neuropathy, with long-term current use of insulin ([facility] [address]    -  Primary ICD-10-CM: E11.40, Z79.4, E11.65 ICD-9-CM: 250.62, 357.2, V58.67 Vitals BP Pulse Resp Height(growth percentile) Weight(growth percentile) SpO2  
 131/56 (BP 1 Location: Right arm, BP Patient Position: Sitting) 65 16 5' 3\" (1.6 m) 164 lb 12.8 oz (74.8 kg) 99% BMI OB Status Smoking Status 29.19 kg/m2 Postmenopausal Never Smoker BMI and BSA Data Body Mass Index Body Surface Area  
 29.19 kg/m 2 1.82 m 2 Preferred Pharmacy Pharmacy Name Phone [facility] #[id number] Santana [phone] Your Updated Medication List  
  
   
This list is accurate as of 7/17/18 12:23 PM.  Always use your most recent med list.  
  
  
  
  
 acetaminophen-codeine 300-30 mg per tablet Commonly known as:  TYLENOL #3  
  
 albuterol 90 mcg/actuation inhaler Commonly known as:  PROVENTIL HFA, VENTOLIN HFA, PROAIR HFA Take 2 Puffs by inhalation every four (4) hours as needed. aspirin delayed-release 81 mg tablet Take 81 mg by mouth daily. Azelastine 0.15 % (205.5 mcg) nasal spray Commonly known as:  ASTEPRO  
2 Sprays by Both Nostrils route two (2) times a day. AZOPT 1 % ophthalmic suspension Generic drug:  brinzolamide Administer 1 Drop to both eyes three (3) times daily. Blood-Glucose Meter monitoring kit Commonly known as:  [address] Check BG 2 times/day  
  
 clindamycin 150 mg capsule Commonly known as:  CLEOCIN  
  
 diazePAM 2 mg tablet Commonly known as:  VALIUM Take 1 Tab by mouth nightly as needed for Anxiety. dicyclomine 20 mg tablet Commonly known as:  BENTYL TAKE 1 TABLET BY MOUTH FOUR TIMES A DAY  
  
 ELIQUIS 5 mg tablet Generic drug:  apixaban ENULOSE 10 gram/15 mL solution Generic drug:  lactulose Take  by mouth three (3) times daily. fluticasone 50 mcg/actuation nasal spray Commonly known as:  FLONASE  
  
 glipiZIDE SR 10 mg CR tablet Commonly known as:  GLUCOTROL XL Take 1 Tab by mouth daily. Indications: type 2 diabetes mellitus  
  
 glucose blood VI test strips strip Commonly known as:  Berkley Early Check BG 1-2 times/day  
  
 hydroCHLOROthiazide 25 mg tablet Commonly known as:  HYDRODIURIL  
 Take 25 mg by mouth daily. insulin detemir U-100 100 unit/mL (3 mL) Inpn Commonly known as:  Tian Luke Inject 18 units daily Insulin Needles (Disposable) 32 gauge x 5/32\" Ndle Commonly known as:  Andrés Pen Needle 1 Each by Does Not Apply route daily. E11.40 Please fill today. Iron 325 mg (65 mg iron) tablet Generic drug:  ferrous sulfate Take  by mouth Daily (before breakfast). Lancing Device Misc Check BG 1-2 times/day Lancing Device with Lancets Kit Check BG 3-4 times/day LINZESS PO Take  by mouth.  
  
 losartan 50 mg tablet Commonly known as:  COZAAR Take 1 Tab by mouth daily. metFORMIN 1,000 mg tablet Commonly known as:  GLUCOPHAGE Take 1 Tab by mouth two (2) times daily (with meals). methenamine hippurate 1 gram tablet Commonly known as:  HIPREX Take 1 g by mouth two (2) times daily (with meals). MYRBETRIQ 50 mg ER tablet Generic drug:  mirabegron ER NexIUM 40 mg capsule Generic drug:  esomeprazole Take  by mouth daily. pioglitazone 30 mg tablet Commonly known as:  ACTOS Take 1 Tab by mouth daily. simvastatin 20 mg tablet Commonly known as:  ZOCOR Take 1 Tab by mouth nightly. SINGULAIR 10 mg tablet Generic drug:  montelukast  
Take 10 mg by mouth daily. sucralfate 1 gram tablet Commonly known as:  CARAFATE  
  
 timolol 0.5 % ophthalmic solution Commonly known as:  TIMOPTIC  
  
 traMADol 50 mg tablet Commonly known as:  ULTRAM  
TAKE 1 TABLET BY MOUTH EVERY 8 HOURS AS NEEDED  
  
 VITAMIN D3 400 unit Tab tablet Generic drug:  cholecalciferol Take  by mouth daily. XALATAN 0.005 % ophthalmic solution Generic drug:  latanoprost  
Administer 1 Drop to both eyes nightly. Prescriptions Sent to Pharmacy Refills  Insulin Needles, Disposable, (ANDRÉS PEN NEEDLE) 32 gauge x 5/32\" ndle 3  
 Si Each by Does Not Apply route daily. E11.40 Please fill today. Class: Normal  
 Pharmacy: [facility] #: [phone] Route: Does Not Apply  
 insulin detemir U-100 (LEVEMIR FLEXTOUCH) 100 unit/mL (3 mL) inpn 3 Sig: Inject 18 units daily Class: Normal  
 Pharmacy: [facility] #: [phone] Lancing Device with Lancets kit 0 Sig: Check BG 3-4 times/day Class: Normal  
 Pharmacy: [facility] #: [phone] We Performed the Following BASIC METABOLIC PANEL (7) [11335 CPT(R)] HEMOGLOBIN A1C WITH EAG [68851 CPT(R)] LIPID PANEL [97461 CPT(R)] Patient Instructions Diabetes Medications: 
- continue Levemir insulin 18 units every morning 
- continue Actos (pioglitazone) 30mg daily 
- continue taking Glipizide 10mg every morning 
- continue taking metformin 1000mg (two 500mg tablets) twice a day Twice a week: check your blood sugar 1-2 hours after a dinner Goal blood sugars: 100-199 Call me if you have blood sugars less than 80 Introducing [facility] SERVICES! Nazario Pedraza introduces [employer] patient portal. Now you can access parts of your medical record, email your doctor's office, and request medication refills online. 1. In your internet browser, go to [email or web address] 2. Click on the First Time User? Click Here link in the Sign In box. You will see the New Member Sign Up page. 3. Enter your Rem[employer]ify Access Code exactly as it appears below. You will not need to use this code after youve completed the sign-up process. If you do not sign up before the expiration date, you must request a new code. · Rem[employer] Access Code: [email or web address] Expires: 2018  3:05 PM 
 
 4. Enter the last four digits of your Social Security Number (xxxx) and Date of Birth (mm/dd/yyyy) as indicated and click Submit. You will be taken to the next sign-up page. 5. Create a [employer] ID. This will be your [employer] login ID and cannot be changed, so think of one that is secure and easy to remember. 6. Create a [employer] password. You can change your password at any time. 7. Enter your Password Reset Question and Answer. This can be used at a later time if you forget your password. 8. Enter your e-mail address. You will receive e-mail notification when new information is available in [address] 9. Click Sign Up. You can now view and download portions of your medical record. 10. Click the Download Summary menu link to download a portable copy of your medical information. If you have questions, please visit the Frequently Asked Questions section of the [employer] website. Remember, [employer] is NOT to be used for urgent needs. For medical emergencies, dial 911. Now available from your iPhone and Android! Please provide this summary of care documentation to your next provider. Your primary care clinician is listed as Eboni Taylor. If you have any questions after today's visit, please call [phone].

## 2018-07-18 LAB
BUN SERPL-MCNC: 16 MG/DL (ref 8–27)
BUN/CREAT SERPL: 27 (ref 12–28)
CHLORIDE SERPL-SCNC: 101 MMOL/L (ref 96–106)
CHOLEST SERPL-MCNC: 103 MG/DL (ref 100–199)
CO2 SERPL-SCNC: 22 MMOL/L (ref 20–29)
CREAT SERPL-MCNC: 0.6 MG/DL (ref 0.57–1)
EST. AVERAGE GLUCOSE BLD GHB EST-MCNC: 177 MG/DL
GLUCOSE SERPL-MCNC: 135 MG/DL (ref 65–99)
HBA1C MFR BLD: 7.8 % (ref 4.8–5.6)
HDLC SERPL-MCNC: 65 MG/DL
LDLC SERPL CALC-MCNC: 28 MG/DL (ref 0–99)
POTASSIUM SERPL-SCNC: 4.4 MMOL/L (ref 3.5–5.2)
SODIUM SERPL-SCNC: 141 MMOL/L (ref 134–144)
TRIGL SERPL-MCNC: 50 MG/DL (ref 0–149)
VLDLC SERPL CALC-MCNC: 10 MG/DL (ref 5–40)

## 2018-08-27 ENCOUNTER — HOSPITAL ENCOUNTER (INPATIENT)
Age: 78
LOS: 8 days | Discharge: NURSING, REHAB OR LONG-TERM CARE | DRG: 435 | End: 2018-09-04
Attending: STUDENT IN AN ORGANIZED HEALTH CARE EDUCATION/TRAINING PROGRAM | Admitting: INTERNAL MEDICINE
Payer: MEDICARE

## 2018-08-27 ENCOUNTER — APPOINTMENT (OUTPATIENT)
Dept: CT IMAGING | Age: 78
DRG: 435 | End: 2018-08-27
Attending: FAMILY MEDICINE
Payer: MEDICARE

## 2018-08-27 ENCOUNTER — APPOINTMENT (OUTPATIENT)
Dept: CT IMAGING | Age: 78
DRG: 435 | End: 2018-08-27
Attending: STUDENT IN AN ORGANIZED HEALTH CARE EDUCATION/TRAINING PROGRAM
Payer: MEDICARE

## 2018-08-27 ENCOUNTER — APPOINTMENT (OUTPATIENT)
Dept: ULTRASOUND IMAGING | Age: 78
DRG: 435 | End: 2018-08-27
Attending: INTERNAL MEDICINE
Payer: MEDICARE

## 2018-08-27 DIAGNOSIS — D64.9 ANEMIA, UNSPECIFIED TYPE: ICD-10-CM

## 2018-08-27 DIAGNOSIS — C79.9 MULTIPLE LESIONS OF METASTATIC MALIGNANCY (HCC): Primary | ICD-10-CM

## 2018-08-27 DIAGNOSIS — R10.84 ABDOMINAL PAIN, GENERALIZED: ICD-10-CM

## 2018-08-27 PROBLEM — K92.2 ACUTE UPPER GI BLEED: Status: ACTIVE | Noted: 2018-08-27

## 2018-08-27 LAB
ALBUMIN SERPL-MCNC: 3.4 G/DL (ref 3.5–5)
ALBUMIN/GLOB SERPL: 1.2 {RATIO} (ref 1.1–2.2)
ALP SERPL-CCNC: 291 U/L (ref 45–117)
ALT SERPL-CCNC: 444 U/L (ref 12–78)
AMYLASE SERPL-CCNC: 25 U/L (ref 25–115)
ANION GAP SERPL CALC-SCNC: 12 MMOL/L (ref 5–15)
APPEARANCE UR: CLEAR
AST SERPL-CCNC: 423 U/L (ref 15–37)
ATRIAL RATE: 63 BPM
BACTERIA URNS QL MICRO: NEGATIVE /HPF
BASOPHILS # BLD: 0 K/UL (ref 0–0.1)
BASOPHILS NFR BLD: 0 % (ref 0–1)
BILIRUB SERPL-MCNC: 0.7 MG/DL (ref 0.2–1)
BILIRUB UR QL: NEGATIVE
BUN SERPL-MCNC: 50 MG/DL (ref 6–20)
BUN/CREAT SERPL: 50 (ref 12–20)
CALCIUM SERPL-MCNC: 8.9 MG/DL (ref 8.5–10.1)
CALCULATED R AXIS, ECG10: 91 DEGREES
CALCULATED T AXIS, ECG11: -108 DEGREES
CHLORIDE SERPL-SCNC: 102 MMOL/L (ref 97–108)
CK MB CFR SERPL CALC: 2.1 % (ref 0–2.5)
CK MB SERPL-MCNC: 2.3 NG/ML (ref 5–25)
CK SERPL-CCNC: 112 U/L (ref 26–192)
CO2 SERPL-SCNC: 24 MMOL/L (ref 21–32)
COLOR UR: ABNORMAL
COMMENT, HOLDF: NORMAL
CREAT SERPL-MCNC: 1 MG/DL (ref 0.55–1.02)
DIAGNOSIS, 93000: NORMAL
DIFFERENTIAL METHOD BLD: ABNORMAL
EOSINOPHIL # BLD: 0 K/UL (ref 0–0.4)
EOSINOPHIL NFR BLD: 0 % (ref 0–7)
EPITH CASTS URNS QL MICRO: ABNORMAL /LPF
ERYTHROCYTE [DISTWIDTH] IN BLOOD BY AUTOMATED COUNT: 13.7 % (ref 11.5–14.5)
FERRITIN SERPL-MCNC: 120 NG/ML (ref 8–252)
FOLATE SERPL-MCNC: 25.3 NG/ML (ref 5–21)
GLOBULIN SER CALC-MCNC: 2.9 G/DL (ref 2–4)
GLUCOSE BLD STRIP.AUTO-MCNC: 141 MG/DL (ref 65–100)
GLUCOSE BLD STRIP.AUTO-MCNC: 227 MG/DL (ref 65–100)
GLUCOSE BLD STRIP.AUTO-MCNC: 235 MG/DL (ref 65–100)
GLUCOSE BLD STRIP.AUTO-MCNC: 291 MG/DL (ref 65–100)
GLUCOSE SERPL-MCNC: 217 MG/DL (ref 65–100)
GLUCOSE UR STRIP.AUTO-MCNC: NEGATIVE MG/DL
HCT VFR BLD AUTO: 18.2 % (ref 35–47)
HEMOCCULT STL QL: POSITIVE
HGB BLD-MCNC: 5.9 G/DL (ref 11.5–16)
HGB UR QL STRIP: NEGATIVE
HYALINE CASTS URNS QL MICRO: ABNORMAL /LPF (ref 0–5)
IMM GRANULOCYTES # BLD: 0.1 K/UL (ref 0–0.04)
IMM GRANULOCYTES NFR BLD AUTO: 1 % (ref 0–0.5)
IRON SATN MFR SERPL: 38 % (ref 20–50)
IRON SERPL-MCNC: 105 UG/DL (ref 35–150)
KETONES UR QL STRIP.AUTO: ABNORMAL MG/DL
LEUKOCYTE ESTERASE UR QL STRIP.AUTO: NEGATIVE
LIPASE SERPL-CCNC: 47 U/L (ref 73–393)
LIPASE SERPL-CCNC: 51 U/L (ref 73–393)
LYMPHOCYTES # BLD: 1.3 K/UL (ref 0.8–3.5)
LYMPHOCYTES NFR BLD: 11 % (ref 12–49)
MAGNESIUM SERPL-MCNC: 1.6 MG/DL (ref 1.6–2.4)
MCH RBC QN AUTO: 31.9 PG (ref 26–34)
MCHC RBC AUTO-ENTMCNC: 32.4 G/DL (ref 30–36.5)
MCV RBC AUTO: 98.4 FL (ref 80–99)
MONOCYTES # BLD: 0.6 K/UL (ref 0–1)
MONOCYTES NFR BLD: 5 % (ref 5–13)
NEUTS SEG # BLD: 9.8 K/UL (ref 1.8–8)
NEUTS SEG NFR BLD: 83 % (ref 32–75)
NITRITE UR QL STRIP.AUTO: NEGATIVE
NRBC # BLD: 0 K/UL (ref 0–0.01)
NRBC BLD-RTO: 0 PER 100 WBC
PH UR STRIP: 5 [PH] (ref 5–8)
PHOSPHATE SERPL-MCNC: 2.1 MG/DL (ref 2.6–4.7)
PLATELET # BLD AUTO: 169 K/UL (ref 150–400)
PMV BLD AUTO: 11 FL (ref 8.9–12.9)
POTASSIUM SERPL-SCNC: 3.7 MMOL/L (ref 3.5–5.1)
PROT SERPL-MCNC: 6.3 G/DL (ref 6.4–8.2)
PROT UR STRIP-MCNC: NEGATIVE MG/DL
Q-T INTERVAL, ECG07: 472 MS
QRS DURATION, ECG06: 128 MS
QTC CALCULATION (BEZET), ECG08: 494 MS
RBC # BLD AUTO: 1.85 M/UL (ref 3.8–5.2)
RBC #/AREA URNS HPF: ABNORMAL /HPF (ref 0–5)
RBC MORPH BLD: ABNORMAL
RBC MORPH BLD: ABNORMAL
SAMPLES BEING HELD,HOLD: NORMAL
SERVICE CMNT-IMP: ABNORMAL
SODIUM SERPL-SCNC: 138 MMOL/L (ref 136–145)
SP GR UR REFRACTOMETRY: 1.02 (ref 1–1.03)
TIBC SERPL-MCNC: 278 UG/DL (ref 250–450)
TROPONIN I SERPL-MCNC: <0.05 NG/ML
TSH SERPL DL<=0.05 MIU/L-ACNC: 1.46 UIU/ML (ref 0.36–3.74)
UR CULT HOLD, URHOLD: NORMAL
UROBILINOGEN UR QL STRIP.AUTO: 0.2 EU/DL (ref 0.2–1)
VENTRICULAR RATE, ECG03: 66 BPM
VIT B12 SERPL-MCNC: 366 PG/ML (ref 193–986)
WBC # BLD AUTO: 11.8 K/UL (ref 3.6–11)
WBC URNS QL MICRO: ABNORMAL /HPF (ref 0–4)

## 2018-08-27 PROCEDURE — 96374 THER/PROPH/DIAG INJ IV PUSH: CPT

## 2018-08-27 PROCEDURE — 74011250636 HC RX REV CODE- 250/636: Performed by: INTERNAL MEDICINE

## 2018-08-27 PROCEDURE — 74176 CT ABD & PELVIS W/O CONTRAST: CPT

## 2018-08-27 PROCEDURE — 82607 VITAMIN B-12: CPT | Performed by: FAMILY MEDICINE

## 2018-08-27 PROCEDURE — 99285 EMERGENCY DEPT VISIT HI MDM: CPT

## 2018-08-27 PROCEDURE — 80053 COMPREHEN METABOLIC PANEL: CPT | Performed by: STUDENT IN AN ORGANIZED HEALTH CARE EDUCATION/TRAINING PROGRAM

## 2018-08-27 PROCEDURE — 83690 ASSAY OF LIPASE: CPT | Performed by: STUDENT IN AN ORGANIZED HEALTH CARE EDUCATION/TRAINING PROGRAM

## 2018-08-27 PROCEDURE — 85025 COMPLETE CBC W/AUTO DIFF WBC: CPT | Performed by: STUDENT IN AN ORGANIZED HEALTH CARE EDUCATION/TRAINING PROGRAM

## 2018-08-27 PROCEDURE — 84100 ASSAY OF PHOSPHORUS: CPT | Performed by: FAMILY MEDICINE

## 2018-08-27 PROCEDURE — 81001 URINALYSIS AUTO W/SCOPE: CPT | Performed by: STUDENT IN AN ORGANIZED HEALTH CARE EDUCATION/TRAINING PROGRAM

## 2018-08-27 PROCEDURE — 93005 ELECTROCARDIOGRAM TRACING: CPT

## 2018-08-27 PROCEDURE — 82962 GLUCOSE BLOOD TEST: CPT

## 2018-08-27 PROCEDURE — 65660000000 HC RM CCU STEPDOWN

## 2018-08-27 PROCEDURE — 74011250636 HC RX REV CODE- 250/636: Performed by: STUDENT IN AN ORGANIZED HEALTH CARE EDUCATION/TRAINING PROGRAM

## 2018-08-27 PROCEDURE — 94760 N-INVAS EAR/PLS OXIMETRY 1: CPT

## 2018-08-27 PROCEDURE — 36415 COLL VENOUS BLD VENIPUNCTURE: CPT | Performed by: STUDENT IN AN ORGANIZED HEALTH CARE EDUCATION/TRAINING PROGRAM

## 2018-08-27 PROCEDURE — 82746 ASSAY OF FOLIC ACID SERUM: CPT | Performed by: FAMILY MEDICINE

## 2018-08-27 PROCEDURE — 74011000258 HC RX REV CODE- 258: Performed by: INTERNAL MEDICINE

## 2018-08-27 PROCEDURE — 83690 ASSAY OF LIPASE: CPT | Performed by: FAMILY MEDICINE

## 2018-08-27 PROCEDURE — 82728 ASSAY OF FERRITIN: CPT | Performed by: FAMILY MEDICINE

## 2018-08-27 PROCEDURE — 83540 ASSAY OF IRON: CPT | Performed by: FAMILY MEDICINE

## 2018-08-27 PROCEDURE — 76705 ECHO EXAM OF ABDOMEN: CPT

## 2018-08-27 PROCEDURE — 86923 COMPATIBILITY TEST ELECTRIC: CPT | Performed by: STUDENT IN AN ORGANIZED HEALTH CARE EDUCATION/TRAINING PROGRAM

## 2018-08-27 PROCEDURE — 83735 ASSAY OF MAGNESIUM: CPT | Performed by: FAMILY MEDICINE

## 2018-08-27 PROCEDURE — 74011250637 HC RX REV CODE- 250/637: Performed by: INTERNAL MEDICINE

## 2018-08-27 PROCEDURE — 74011636320 HC RX REV CODE- 636/320: Performed by: INTERNAL MEDICINE

## 2018-08-27 PROCEDURE — 84484 ASSAY OF TROPONIN QUANT: CPT | Performed by: FAMILY MEDICINE

## 2018-08-27 PROCEDURE — 74011000250 HC RX REV CODE- 250: Performed by: INTERNAL MEDICINE

## 2018-08-27 PROCEDURE — 36430 TRANSFUSION BLD/BLD COMPNT: CPT

## 2018-08-27 PROCEDURE — 82150 ASSAY OF AMYLASE: CPT | Performed by: FAMILY MEDICINE

## 2018-08-27 PROCEDURE — C9113 INJ PANTOPRAZOLE SODIUM, VIA: HCPCS | Performed by: INTERNAL MEDICINE

## 2018-08-27 PROCEDURE — 82550 ASSAY OF CK (CPK): CPT | Performed by: FAMILY MEDICINE

## 2018-08-27 PROCEDURE — P9016 RBC LEUKOCYTES REDUCED: HCPCS | Performed by: STUDENT IN AN ORGANIZED HEALTH CARE EDUCATION/TRAINING PROGRAM

## 2018-08-27 PROCEDURE — 86900 BLOOD TYPING SEROLOGIC ABO: CPT | Performed by: STUDENT IN AN ORGANIZED HEALTH CARE EDUCATION/TRAINING PROGRAM

## 2018-08-27 PROCEDURE — 96375 TX/PRO/DX INJ NEW DRUG ADDON: CPT

## 2018-08-27 PROCEDURE — 82272 OCCULT BLD FECES 1-3 TESTS: CPT | Performed by: STUDENT IN AN ORGANIZED HEALTH CARE EDUCATION/TRAINING PROGRAM

## 2018-08-27 PROCEDURE — 84443 ASSAY THYROID STIM HORMONE: CPT | Performed by: FAMILY MEDICINE

## 2018-08-27 PROCEDURE — 71275 CT ANGIOGRAPHY CHEST: CPT

## 2018-08-27 PROCEDURE — 74011636637 HC RX REV CODE- 636/637: Performed by: INTERNAL MEDICINE

## 2018-08-27 RX ORDER — LANOLIN ALCOHOL/MO/W.PET/CERES
1 CREAM (GRAM) TOPICAL
Status: DISCONTINUED | OUTPATIENT
Start: 2018-08-27 | End: 2018-09-04 | Stop reason: HOSPADM

## 2018-08-27 RX ORDER — METFORMIN HYDROCHLORIDE 500 MG/1
1000 TABLET ORAL 2 TIMES DAILY WITH MEALS
COMMUNITY
End: 2018-09-04

## 2018-08-27 RX ORDER — SODIUM CHLORIDE 0.9 % (FLUSH) 0.9 %
10 SYRINGE (ML) INJECTION
Status: DISPENSED | OUTPATIENT
Start: 2018-08-27 | End: 2018-08-27

## 2018-08-27 RX ORDER — ONDANSETRON 2 MG/ML
4 INJECTION INTRAMUSCULAR; INTRAVENOUS
Status: COMPLETED | OUTPATIENT
Start: 2018-08-27 | End: 2018-08-27

## 2018-08-27 RX ORDER — DIAZEPAM 2 MG/1
2 TABLET ORAL
Status: DISCONTINUED | OUTPATIENT
Start: 2018-08-27 | End: 2018-09-04 | Stop reason: HOSPADM

## 2018-08-27 RX ORDER — ALBUTEROL SULFATE 90 UG/1
2 AEROSOL, METERED RESPIRATORY (INHALATION)
COMMUNITY
End: 2018-09-04

## 2018-08-27 RX ORDER — SODIUM CHLORIDE 0.9 % (FLUSH) 0.9 %
10 SYRINGE (ML) INJECTION
Status: COMPLETED | OUTPATIENT
Start: 2018-08-27 | End: 2018-08-27

## 2018-08-27 RX ORDER — DEXTROSE 50 % IN WATER (D50W) INTRAVENOUS SYRINGE
12.5-25 AS NEEDED
Status: DISCONTINUED | OUTPATIENT
Start: 2018-08-27 | End: 2018-09-04 | Stop reason: HOSPADM

## 2018-08-27 RX ORDER — MAGNESIUM SULFATE 100 %
4 CRYSTALS MISCELLANEOUS AS NEEDED
Status: DISCONTINUED | OUTPATIENT
Start: 2018-08-27 | End: 2018-09-04 | Stop reason: HOSPADM

## 2018-08-27 RX ORDER — AZELASTINE 1 MG/ML
2 SPRAY, METERED NASAL 2 TIMES DAILY
Status: DISCONTINUED | OUTPATIENT
Start: 2018-08-27 | End: 2018-09-04 | Stop reason: HOSPADM

## 2018-08-27 RX ORDER — INSULIN LISPRO 100 [IU]/ML
INJECTION, SOLUTION INTRAVENOUS; SUBCUTANEOUS
Status: DISCONTINUED | OUTPATIENT
Start: 2018-08-27 | End: 2018-09-04 | Stop reason: HOSPADM

## 2018-08-27 RX ORDER — ONDANSETRON 2 MG/ML
4 INJECTION INTRAMUSCULAR; INTRAVENOUS
Status: DISCONTINUED | OUTPATIENT
Start: 2018-08-27 | End: 2018-09-04 | Stop reason: HOSPADM

## 2018-08-27 RX ORDER — DORZOLAMIDE HCL 20 MG/ML
1 SOLUTION/ DROPS OPHTHALMIC 3 TIMES DAILY
Status: DISCONTINUED | OUTPATIENT
Start: 2018-08-27 | End: 2018-09-04 | Stop reason: HOSPADM

## 2018-08-27 RX ORDER — LATANOPROST 50 UG/ML
1 SOLUTION/ DROPS OPHTHALMIC
Status: DISCONTINUED | OUTPATIENT
Start: 2018-08-27 | End: 2018-09-04 | Stop reason: HOSPADM

## 2018-08-27 RX ORDER — TIMOLOL MALEATE 5 MG/ML
1 SOLUTION/ DROPS OPHTHALMIC DAILY
Status: DISCONTINUED | OUTPATIENT
Start: 2018-08-27 | End: 2018-09-04 | Stop reason: HOSPADM

## 2018-08-27 RX ORDER — FENTANYL CITRATE 50 UG/ML
50 INJECTION, SOLUTION INTRAMUSCULAR; INTRAVENOUS
Status: COMPLETED | OUTPATIENT
Start: 2018-08-27 | End: 2018-08-27

## 2018-08-27 RX ORDER — LOSARTAN POTASSIUM 50 MG/1
50 TABLET ORAL DAILY
Status: DISCONTINUED | OUTPATIENT
Start: 2018-08-27 | End: 2018-09-04 | Stop reason: HOSPADM

## 2018-08-27 RX ORDER — SODIUM CHLORIDE 0.9 % (FLUSH) 0.9 %
5-10 SYRINGE (ML) INJECTION AS NEEDED
Status: DISCONTINUED | OUTPATIENT
Start: 2018-08-27 | End: 2018-09-04 | Stop reason: HOSPADM

## 2018-08-27 RX ORDER — SODIUM CHLORIDE 0.9 % (FLUSH) 0.9 %
5-10 SYRINGE (ML) INJECTION EVERY 8 HOURS
Status: DISCONTINUED | OUTPATIENT
Start: 2018-08-27 | End: 2018-09-04 | Stop reason: HOSPADM

## 2018-08-27 RX ORDER — FENTANYL CITRATE 50 UG/ML
25 INJECTION, SOLUTION INTRAMUSCULAR; INTRAVENOUS
Status: DISCONTINUED | OUTPATIENT
Start: 2018-08-27 | End: 2018-09-02

## 2018-08-27 RX ORDER — SODIUM CHLORIDE 9 MG/ML
250 INJECTION, SOLUTION INTRAVENOUS AS NEEDED
Status: DISCONTINUED | OUTPATIENT
Start: 2018-08-27 | End: 2018-09-04 | Stop reason: HOSPADM

## 2018-08-27 RX ORDER — SODIUM CHLORIDE 9 MG/ML
125 INJECTION, SOLUTION INTRAVENOUS CONTINUOUS
Status: DISCONTINUED | OUTPATIENT
Start: 2018-08-27 | End: 2018-09-01

## 2018-08-27 RX ADMIN — SODIUM CHLORIDE 1000 ML: 900 INJECTION, SOLUTION INTRAVENOUS at 03:57

## 2018-08-27 RX ADMIN — LATANOPROST 1 DROP: 50 SOLUTION OPHTHALMIC at 23:30

## 2018-08-27 RX ADMIN — INSULIN LISPRO 3 UNITS: 100 INJECTION, SOLUTION INTRAVENOUS; SUBCUTANEOUS at 18:18

## 2018-08-27 RX ADMIN — FENTANYL CITRATE 50 MCG: 50 INJECTION, SOLUTION INTRAMUSCULAR; INTRAVENOUS at 04:45

## 2018-08-27 RX ADMIN — ONDANSETRON 4 MG: 2 INJECTION INTRAMUSCULAR; INTRAVENOUS at 03:57

## 2018-08-27 RX ADMIN — SODIUM CHLORIDE 100 ML: 900 INJECTION, SOLUTION INTRAVENOUS at 21:40

## 2018-08-27 RX ADMIN — FERROUS SULFATE TAB 325 MG (65 MG ELEMENTAL FE) 325 MG: 325 (65 FE) TAB at 06:59

## 2018-08-27 RX ADMIN — SODIUM CHLORIDE 125 ML/HR: 900 INJECTION, SOLUTION INTRAVENOUS at 08:17

## 2018-08-27 RX ADMIN — TIMOLOL MALEATE 1 DROP: 5 SOLUTION OPHTHALMIC at 14:58

## 2018-08-27 RX ADMIN — Medication 10 ML: at 23:20

## 2018-08-27 RX ADMIN — IOPAMIDOL 100 ML: 755 INJECTION, SOLUTION INTRAVENOUS at 21:39

## 2018-08-27 RX ADMIN — Medication 10 ML: at 21:40

## 2018-08-27 RX ADMIN — DORZOLAMIDE HCL 1 DROP: 20 SOLUTION/ DROPS OPHTHALMIC at 23:18

## 2018-08-27 RX ADMIN — Medication 10 ML: at 14:58

## 2018-08-27 RX ADMIN — SODIUM CHLORIDE 40 MG: 9 INJECTION INTRAMUSCULAR; INTRAVENOUS; SUBCUTANEOUS at 23:18

## 2018-08-27 RX ADMIN — INSULIN LISPRO 3 UNITS: 100 INJECTION, SOLUTION INTRAVENOUS; SUBCUTANEOUS at 06:59

## 2018-08-27 RX ADMIN — AZELASTINE HYDROCHLORIDE 2 SPRAY: 137 SPRAY, METERED NASAL at 18:18

## 2018-08-27 RX ADMIN — DORZOLAMIDE HCL 1 DROP: 20 SOLUTION/ DROPS OPHTHALMIC at 15:00

## 2018-08-27 NOTE — CONSULTS
[address] [place] [id number]        GASTROENTEROLOGY CONSULTATION NOTE  Will Bernie Arthur  [phone] office  [phone] NP/PA in-hospital cell phone M-F until 4:30PM  After 5PM or on weekends, please call  for physician on call        NAME:  Rosa Cates   :   1940   MRN:   [id number]       Referring Physician: Dr. Elvis Staley Date: 2018 3:57 PM    Chief Complaint: abdominal pain     History of Present Illness:  Patient is a [de-identified] y.o. who is seen in consultation at the request of Dr. Anjel Queen for GI bleed. Patient has a past medical history significant for type II diabetes, dyslipidemia, coronary artery disease status post CABG, and status post pacemaker. Patient presented to the ED with complaints of abdominal pain. She was admitted to the hospital on 18. Patient complains of generalized abdominal pain for the last few months. She has been seen by her primary care physician, gastroenterology, and endocrinology. Patient was seen in our office by Dr. Joshua Thapa on 18. Patient was prescribed Carafate and Linzess. There has been associated nausea and episodic vomiting. No hematemesis. There has been associated constipation. No melena or hematochezia. EGD (17) showed erythematous and eroded mucosa in the antrum. Colonoscopy (3/7/14) showed non-thrombosed external hemorrhoids and diverticulosis in the sigmoid colon; otherwise, unremarkable. Patient is on Eliquis (last dose yesterday morning) and aspirin. I have reviewed the emergency room note, hospital admission note, notes by all other clinicians who have seen the patient during this hospitalization to date. I have reviewed the problem list and the reason for this hospitalization. I have reviewed the allergies and the medications the patient was taking at home prior to this hospitalization.     PMH:  Past Medical History:   Diagnosis Date    Bradycardia  Diabetes ([facility] [address]     Hyperlipidemia     PUD (peptic ulcer disease)        PSH:  Past Surgical History:   Procedure Laterality Date    CARDIAC SURG PROCEDURE UNLIST      pacemaker, bipass    COLONOSCOPY      HX BUNIONECTOMY      right foot 07    HX CATARACT REMOVAL  2016       Allergies:  No Known Allergies    Home Medications:  Prior to Admission Medications   Prescriptions Last Dose Informant Patient Reported? Taking? Azelastine (ASTEPRO) 0.15 % (205.5 mcg) nasal spray Unknown at Unknown time  No No   Si Sprays by Both Nostrils route two (2) times a day. ELIQUIS 5 mg tablet 2018 at Unknown time  Yes Yes   Si mg two (2) times a day. MYRBETRIQ 50 mg ER tablet 2018 at Unknown time  Yes Yes   Si mg every evening. acetaminophen-codeine (TYLENOL #3) 300-30 mg per tablet   Yes Yes   Sig: daily as needed. albuterol (PROVENTIL HFA, VENTOLIN HFA, PROAIR HFA) 90 mcg/actuation inhaler   Yes Yes   Sig: Take 2 Puffs by inhalation. Every 6-8 hours as needed   aspirin delayed-release 81 mg tablet 2018 at Unknown time  Yes Yes   Sig: Take 81 mg by mouth daily. brinzolamide (AZOPT) 1 % ophthalmic suspension 2018 at Unknown time  Yes Yes   Sig: Administer 1 Drop to both eyes three (3) times daily. cholecalciferol (VITAMIN D3) 400 unit tab tablet   Yes Yes   Sig: Take 400 Units by mouth two (2) times a day. diazepam (VALIUM) 2 mg tablet   No Yes   Sig: Take 1 Tab by mouth nightly as needed for Anxiety. dicyclomine (BENTYL) 20 mg tablet Unknown at Unknown time  Yes No   Sig: TAKE 1 TABLET BY MOUTH FOUR TIMES A DAY   esomeprazole (NEXIUM) 40 mg capsule Unknown at Unknown time  Yes No   Sig: Take  by mouth daily. ferrous sulfate (IRON) 325 mg (65 mg iron) tablet 2018 at Unknown time  Yes Yes   Sig: Take  by mouth Daily (before breakfast).    fluticasone (FLONASE) 50 mcg/actuation nasal spray   Yes Yes   glipiZIDE SR (GLUCOTROL XL) 10 mg CR tablet Unknown at Unknown time  No No   Sig: Take 1 Tab by mouth daily. Indications: type 2 diabetes mellitus   hydrochlorothiazide (HYDRODIURIL) 25 mg tablet   Yes Yes   Sig: Take 25 mg by mouth daily. insulin detemir U-100 (LEVEMIR FLEXTOUCH) 100 unit/mL (3 mL) inpn 2018 at Unknown time  No Yes   Sig: Inject 18 units daily   lactulose (ENULOSE) 10 gram/15 mL solution Unknown at Unknown time  Yes No   Sig: Take  by mouth three (3) times daily. latanoprost (XALATAN) 0.005 % ophthalmic solution 2018 at Unknown time  Yes Yes   Sig: Administer 1 Drop to both eyes nightly. linaclotide (LINZESS) 145 mcg cap capsule 2018 at Unknown time  Yes Yes   Sig: Take 145 mcg by mouth daily. losartan (COZAAR) 50 mg tablet   No Yes   Sig: Take 1 Tab by mouth daily. metFORMIN (GLUCOPHAGE) 500 mg tablet 2018 at Unknown time  Yes Yes   Sig: Take 1,000 mg by mouth two (2) times daily (with meals). methenamine hippurate (HIPREX) 1 gram tablet Unknown at Unknown time  Yes No   Sig: Take 1 g by mouth two (2) times daily (with meals). montelukast (SINGULAIR) 10 mg tablet Unknown at Unknown time  Yes No   Sig: Take 10 mg by mouth daily. pioglitazone (ACTOS) 30 mg tablet 2018 at Unknown time  No Yes   Sig: Take 1 Tab by mouth daily. simvastatin (ZOCOR) 20 mg tablet 2018 at Unknown time  No Yes   Sig: Take 1 Tab by mouth nightly. sucralfate (CARAFATE) 1 gram tablet 2018 at Unknown time  Yes Yes   Si g two (2) times a day. timolol (TIMOPTIC) 0.5 % ophthalmic solution   Yes Yes   Sig: Administer 1 Drop to both eyes daily.       Facility-Administered Medications: None       Hospital Medications:  Current Facility-Administered Medications   Medication Dose Route Frequency    0.9% sodium chloride infusion 250 mL  250 mL IntraVENous PRN    0.9% sodium chloride infusion  125 mL/hr IntraVENous CONTINUOUS    pantoprazole (PROTONIX) 40 mg in sodium chloride 0.9% 10 mL injection  40 mg IntraVENous Q12H    azelastine (ASTELIN) 137mcg/spray nasal spray  2 Spray Both Nostrils BID    dorzolamide (TRUSOPT) 2 % ophthalmic solution 1 Drop  1 Drop Both Eyes TID    diazePAM (VALIUM) tablet 2 mg  2 mg Oral QHS PRN    ferrous sulfate tablet 325 mg  1 Tab Oral ACB    latanoprost (XALATAN) 0.005 % ophthalmic solution 1 Drop  1 Drop Both Eyes QHS    losartan (COZAAR) tablet 50 mg  50 mg Oral DAILY    timolol (TIMOPTIC) 0.5 % ophthalmic solution 1 Drop  1 Drop Both Eyes DAILY    sodium chloride (NS) flush 5-10 mL  5-10 mL IntraVENous Q8H    sodium chloride (NS) flush 5-10 mL  5-10 mL IntraVENous PRN    fentaNYL citrate (PF) injection 25 mcg  25 mcg IntraVENous Q4H PRN    ondansetron (ZOFRAN) injection 4 mg  4 mg IntraVENous Q4H PRN    insulin lispro (HUMALOG) injection   SubCUTAneous AC&HS    glucose chewable tablet 16 g  4 Tab Oral PRN    dextrose (D50W) injection syrg 12.5-25 g  12.5-25 g IntraVENous PRN    glucagon (GLUCAGEN) injection 1 mg  1 mg IntraMUSCular PRN    sodium chloride 0.9 % bolus infusion 100 mL  100 mL IntraVENous RAD ONCE    iopamidol (ISOVUE-370) 76 % injection 100 mL  100 mL IntraVENous RAD ONCE    sodium chloride (NS) flush 10 mL  10 mL IntraVENous RAD ONCE       Social History:  Social History   Substance Use Topics    Smoking status: Never Smoker    Smokeless tobacco: Never Used    Alcohol use No       Family History:  Family History   Problem Relation Age of Onset    Diabetes Mother     Hypertension Father        Review of Systems:  Constitutional: negative fever, negative chills, negative weight loss  Eyes:   negative visual changes  ENT:   negative sore throat, tongue or lip swelling  Respiratory:  negative cough, negative dyspnea  Cards:  negative for chest pain, palpitations, lower extremity edema  GI:   See HPI  :  negative for frequency, dysuria  Integument:  negative for rash and pruritus  Heme:  negative for easy bruising and gum/nose bleeding  Musculoskeletal:negative for myalgias, back pain and muscle weakness  Neuro:  negative for headaches, dizziness, vertigo  Psych: negative for feelings of anxiety, depression     Objective:   Patient Vitals for the past 8 hrs:   BP Temp Pulse Resp SpO2   08/27/18 1228 132/88 98.2 °F (36.8 °C) 65 16 100 %   08/27/18 1131 116/48 98.4 °F (36.9 °C) 65 15 100 %   08/27/18 1130 116/48 - 65 - 100 %   08/27/18 1100 111/45 - 66 - 100 %   08/27/18 1031 116/66 97.8 °F (36.6 °C) 65 16 100 %   08/27/18 1030 (!) 134/98 - 68 - -   08/27/18 1000 129/68 97.7 °F (36.5 °C) 65 15 100 %   08/27/18 0946 113/47 97.9 °F (36.6 °C) 65 15 100 %   08/27/18 0927 107/46 98.1 °F (36.7 °C) 71 16 100 %   08/27/18 0810 104/41 97.8 °F (36.6 °C) 65 15 100 %             EXAM:     CONST:  Pleasant female lying in bed, no acute distress, daughter is at the bedside   NEURO:  Alert and oriented   HEENT: EOMI, no scleral icterus   LUNGS: CTA bilaterally anteriorly   CARD:  S1 S2, regular rate and rhythm   ABD:  Soft, mildly distended, no tenderness, no rebound, no guarding. + Bowel sounds. EXT:  Warm   PSYCH: Full, not anxious     Data Review     Recent Labs      08/27/18   0342   WBC  11.8*   HGB  5.9*   HCT  18.2*   PLT  169     Recent Labs      08/27/18   0342   NA  138   K  3.7   CL  102   CO2  24   BUN  50*   CREA  1.00   GLU  217*   PHOS  2.1*   CA  8.9     Recent Labs      08/27/18   0342   SGOT  423*   AP  291*   TP  6.3*   ALB  3.4*   GLOB  2.9   AML  25   LPSE  47*  51*     No results for input(s): INR, PTP, APTT in the last 72 hours. No lab exists for component: INREXT    CT abdomen/pelvis without contrast (8/27/18)  INDICATION: Abdominal pain and vomiting     COMPARISON: July 31, 2006     TECHNIQUE:   Thin axial images were obtained through the abdomen and pelvis. Coronal and  sagittal reconstructions were generated. Oral contrast was not administered.  CT  dose reduction was achieved through use of a standardized protocol tailored for  this examination and automatic exposure control for dose modulation.     The absence of intravenous contrast material reduces the sensitivity for  evaluation of the solid parenchymal organs of the abdomen.      FINDINGS:   LUNG BASES: There are innumerable bilateral pulmonary nodules at the lung bases  all measuring less than 5 mm  INCIDENTALLY IMAGED HEART AND MEDIASTINUM: There are coronary artery  calcifications  LIVER: No mass or biliary dilatation. GALLBLADDER: Gallbladder is distended  SPLEEN: No mass. PANCREAS: There is a 2.8 x 2.7 cm mass within the head of the pancreas. Body and  tail are atrophied with dilatation of the duct  ADRENALS: Unremarkable. KIDNEYS/URETERS: No mass, calculus, or hydronephrosis. STOMACH: Unremarkable. SMALL BOWEL: There is thickening of the second portion of the duodenum. Remainder of the small bowel is normal in appearance  COLON: No dilatation or wall thickening. APPENDIX: Unremarkable. PERITONEUM: There is no free air or free fluid. There are however innumerable  small nodules along the omentum  RETROPERITONEUM: No lymphadenopathy or aortic aneurysm. There are vascular  calcifications  REPRODUCTIVE ORGANS: Surgically absent  URINARY BLADDER: No mass or calculus. BONES: No destructive bone lesion. ADDITIONAL COMMENTS: N/A     IMPRESSION  IMPRESSION:     1. Mass lesion within the head of the pancreas with atrophy of the body and tail  highly concerning for adenocarcinoma. Endoscopic ultrasound with biopsy is  recommended  2. Thickening of the second portion of the duodenum which is likely due to  direct invasion of the adjacent pancreatic adenocarcinoma  3. Innumerable small nodules within the small bowel mesentery and omentum  concerning for metastatic spread  4. Innumerable small nodules at the lung bases. Given findings in the abdomen  this is concerning for metastatic disease      Assessment:   · GI bleed: anemia with hemoccult positive stool. Hgb 5.9. Status post 2 units PRBCs.   · Abdominal pain: WBC 11.8. CT abdomen/pelvis without contrast (8/27/18): mass lesion within the head of the pancreas with atrophy of the body and tail highly concerning for adenocarcinoma; findings as above. · Suspected metastatic pancreatic adenocarcinoma: CT abdomen/pelvis without contrast (8/27/18): findings above. Oncology consulted. · Elevated LFTs: AST: 423, ALT: 444, alkaline phosphatase: 291, total bilirubin: normal.     Patient Active Problem List   Diagnosis Code    Glaucoma H40.9    GERD (gastroesophageal reflux disease) K21.9    Uncontrolled type 2 diabetes mellitus with diabetic neuropathy, with long-term current use of insulin (HCC) E11.40, Z79.4, E11.65    Essential hypertension, benign I10    Hyperlipidemia E78.5    Anxiety F41.9    Osteoarthritis M19.90    Cardiac pacemaker in situ Z95.0    Diabetes ([facility] [address] E11.9    Acute upper GI bleed K92.2     Plan:   · NPO after midnight  · PPI BID  · Trend CBC and transfuse as necessary. Hold anticoagulation. · Follow LFTs. RUQ ultrasound pending. · EGD tomorrow with Dr. Niyah Oconnor. Risks of the procedure to include (but not limited to) anesthesia, bleeding, infection, and perforation were discussed. Patient understands and is in agreement with the plan. Please verify consent has been obtained. · Next consider endoscopic ultrasound  · Oncology following  · Thank you for allowing me to participate in care of John De Anda     Signed By: Moris Mckeon     8/27/2018  3:57 PM     Patient seen and examined, agree with plans, it should be noted she had an essentially negative 2.5 months ago at [facility].  We will start with EGD tomorrow and will probably need EUS, thanks    Adia Fishman MD

## 2018-08-27 NOTE — PROGRESS NOTES
Problem: Discharge Planning  Goal: *Discharge to safe environment  Outcome: Progressing Towards Goal  Disposition Needs: Anticipated plan is for patient to discharge home with family assistance. There are no current CM consults or needs at this time. Disposition needs TBD/subject to change pending recommendations. CM will continue to follow and assist with disposition needs as they arise. Mode of transport at time of discharge to be arranged by CM. Patient requires Medicaid Transport home.     KRISHNA Robles/NAOMI  11:12 AM

## 2018-08-27 NOTE — PROGRESS NOTES
Problem: Falls - Risk of  Goal: *Absence of Falls  Document Poli Fall Risk and appropriate interventions in the flowsheet.    Outcome: Progressing Towards Goal  Fall Risk Interventions:                      History of Falls Interventions: Room close to nurse's station

## 2018-08-27 NOTE — PROGRESS NOTES
Interval progress note. Patient was admitted by my colleague this morning.     S: c/o SOB, pain across left upper abdomen, chills, needing to use the bathroom; denies CP; still in ED waiting for bed; getting blood transfusion    O:    Visit Vitals    /47 (BP 1 Location: Left arm, BP Patient Position: At rest)    Pulse 65    Temp 97.9 °F (36.6 °C)    Resp 15    Ht 5' 3\" (1.6 m)    Wt 74.8 kg (164 lb 14.5 oz)    SpO2 100%    BMI 29.21 kg/m2     Gen: awake, NAD, obese  CVS: regular rhythm, normal rate, no m/r/g appreciated, no peripheral edema  Lungs: CTAB anteriorly, no w/r/r  Abd: +BS, soft, ND, NT  MSK: NICOLAS  Neuro: awake, alert, responds appropriately to questions and commands    Labs, imaging, meds reviewed    A/P    Acute blood loss anemia (POA) - currently getting 1st of 2 units PRBC transfusion  - likely due to metastatic pancreatic CA exacerbated by GI blood loss   - iron, B12, folate WNL  - check CBC post-transfusion  - stable for transfer to remote tele    GI bleed (POA) - FOBT +  - IV PPI BID  - pt denies hematochezia/melena  - keep NPO on IVF for now; resume home meds when appropriate  - GI consulted  - hold ASA and apixaban    Prerenal azotemia (POA) - likely due to GI bleed  - continue IVF and BT  - hold home HCTZ    DM2 - last A1c 7.8 (7/2018); SSI  - hold home metformin, insulin detemir, linaclotide, pioglitazone, glipizide  - resume meds as appropriate    Elevated LFTs - check US    Metastatic pancreatic CA - Oncology consulted    Multiple b/l lung nodules (POA) - seen on CXR  - CTA chest ordered    SSS s/p PPM - hold apixaban and ASA for now  - stable for transfer to remote tele    DLD - hold home statin due to transaminitis    HTN - controlled on home meds; hold home HCTZ while on IVF    CAD s/p CABG - hold ASA (due to GIB) and statin (due to transaminitis)  - resumed home ARB  - no BB on PTA med list    Code: Full  DVT Px: Shabnam Curtis MD, [facility]

## 2018-08-27 NOTE — CONSULTS
[address]    Jaclyn Can  MR#: [id number]  : 1940  ACCOUNT #: [de-identified]   DATE OF SERVICE: 2018    HISTORY OF PRESENT ILLNESS:  The patient is an 57-year-old woman with multiple medical problems who is seen after admission to [facility] on  with abdominal pain. This patient has a history of worsening abdominal pain over the past 2 months she has seen Dr. Lalitha Au and describes having had an endoscopy earlier in the summer. We do not have that information immediately available. She states she was started on a medication and improved. However, the short interim prior to admission, these symptoms worsen. She also describes having an episode while standing, her hands and feet went numb. She started shaking all over and she fell without losing consciousness. In the emergency room, a CT scan of the abdomen was performed which identified a mass in the head of the pancreas as well as innumerable small nodules in the small bowel mesentery, omentum and lung bases worrisome for malignancy. PAST MEDICAL HISTORY:  Significant for diabetes, coronary artery disease, hypertension. PAST SURGICAL HISTORY:  Includes cardiac catheterization, coronary artery bypass grafting, pacemaker insertion. ALLERGIES:  NO KNOWN DRUG ALLERGIES. FAMILY HISTORY:  Negative for malignancy. SOCIAL HISTORY:  She lives in [place]. She says she has no immediate family that are close to her though she does have a daughter in [place]. She does not smoke or drink. REVIEW OF SYSTEMS:  She states she has had a determined amount of weight loss over the past 3 months. She denies melena, hemoptysis or hematemesis. PHYSICAL EXAMINATION:  GENERAL:  She is a pleasant, frail overweight woman in no apparent distress. VITAL SIGNS:  Temperature 98, pulse 65, /88, respirations 16, O2 sat 100% on room air. HEENT:  EOMI.   Pale sclerae, nonicteric. NECK:  Supple. She has no palpable lymphadenopathy. LUNGS:  Clear anteriorly. CARDIOVASCULAR:  Regular rate and rhythm, no murmur. ABDOMEN:  Soft, nontender. No hepatosplenomegaly noted. No masses are felt. No ascites appreciated. EXTREMITIES:  She has chronic venous stasis changes in both ankles with trace pedal edema. NEUROLOGIC:  AO x3. Cranial nerves II-XII are intact. Her sensory, motor skills, intact bilaterally and symmetric. Chemistry panel reveals a bilirubin of 0.7 SGPT of 444, AST of 423. CBC identifies a hemoglobin of 5.9, white count 11, platelet count 424. IMPRESSION:    1. Anemia. This appears to be due to gastrointestinal blood loss. The patient denies recent melena or hemoptysis or hematemesis. However, she did have guaiac positive stool in the ER. It would be advantageous to get Dr. Maida Cespedes' EGD report from earlier in the summer. She may have also had a colonoscopy performed. I suspect the blood loss was coming from the lesion at the head of the pancreas however. She is already set up to have transfusions. She has been started on oral iron as well as IV Protonix. GI has been consulted. 2.  Pancreatic mass. This appears to be an adenocarcinoma based on the radiographic appearance. However, tissue diagnosis will be necessary before a treatment plan can be offered. This patient is elderly, has a poor social support system and it is unclear if she would benefit from systemic therapy. We will have to have that discussion after we know exactly what we are dealing with. I would encourage moving forward, however, with an EGD plus EBUS biopsy if possible. We will follow with you on behalf of Neul.       Belvia Bamberger, MD JPE / KELLY  D: 08/27/2018 15:32     T: 08/27/2018 15:47  JOB #: [id number]

## 2018-08-27 NOTE — ED PROVIDER NOTES
HPI Comments: [de-identified] y.o. female with past medical history significant for PUD, DM, HLD, CAD status post CABG, who presents via EMS with chief complaint of lower abdominal pain. Patient states that she has been experiencing pain in the lower abdomen since June 2018. Notes that she has been seen by her PCP, GI, and endocrinology for evaluation, but states \"everything was alright\". Has been taking Carafate without significant relief. Tonight patient felt the onset of 10/10 lower abdominal pain which was constant and \"aching and cramping\" in quality. It is similar to the pain she has been feeling since June. Patient states that since she has arrived to the ED, her pain has somewhat improved down to a 7/10 in severity. She additionally complains of nausea with four episodes of vomiting at home, which is what prompted her ED visit. Pt states that she has been unable to tolerate any solid PO intake since the vomiting started. She estimates that her last meal was between 3446-7664 this evening. Patient also complains of diarrhea, but denies blood in the stool. Also denies any hematemesis, and specifically denies fevers. Denies history of prior abdominal surgery. There are no other acute medical concerns at this time. Social hx: Negative for Tobacco use; Negative for EtOH use; Negative for Illicit Drug Abuse    PCP: Damian Up MD     Note written by Misael Julio. Delmer Griffin, as dictated by Neelima Hope MD 3:49 AM     The history is provided by the patient and medical records. History limited by: patient is a poor historian. No  was used.         Past Medical History:   Diagnosis Date    Bradycardia     Diabetes ([address]     Hyperlipidemia     PUD (peptic ulcer disease)        Past Surgical History:   Procedure Laterality Date    CARDIAC SURG PROCEDURE UNLIST      pacemaker, bipass    COLONOSCOPY  2011    HX BUNIONECTOMY      right foot 11/24/07    HX CATARACT REMOVAL  07/2016 Family History:   Problem Relation Age of Onset    Diabetes Mother     Hypertension Father        Social History     Social History    Marital status:      Spouse name: N/A    Number of children: 3    Years of education: N/A     Occupational History    retired      Social History Main Topics    Smoking status: Never Smoker    Smokeless tobacco: Never Used    Alcohol use No    Drug use: No    Sexual activity: Not Currently     Other Topics Concern    Not on file     Social History Narrative         ALLERGIES: Review of patient's allergies indicates no known allergies. Review of Systems   Constitutional: Negative for chills and fever. HENT: Negative for sore throat. Respiratory: Negative for cough and shortness of breath. Cardiovascular: Negative for chest pain. Gastrointestinal: Positive for abdominal pain, diarrhea, nausea and vomiting. Negative for blood in stool. Genitourinary: Negative for dysuria. Musculoskeletal: Negative for back pain. Skin: Negative for rash. Neurological: Negative for syncope and headaches. Psychiatric/Behavioral: Negative for confusion. All other systems reviewed and are negative. Vitals:    08/27/18 0340   BP: 111/45   Pulse: 66   Resp: 16   Temp: 97.9 °F (36.6 °C)   SpO2: 100%   Weight: 74.8 kg (164 lb 14.5 oz)   Height: 5' 3\" (1.6 m)            Physical Exam   Constitutional: She is oriented to person, place, and time. She appears well-developed. No distress. HENT:   Head: Normocephalic and atraumatic. Eyes: Conjunctivae and EOM are normal. Pupils are equal, round, and reactive to light. Neck: Normal range of motion. Neck supple. Cardiovascular: Normal rate, regular rhythm and normal heart sounds. No murmur heard. Pulmonary/Chest: Effort normal and breath sounds normal. No respiratory distress. Abdominal: Soft. Bowel sounds are normal. She exhibits no distension. There is tenderness in the left lower quadrant.  There is no rebound. Genitourinary:   Genitourinary Comments: Rectal exam: normal tone, no BRBPR, no melena, brown stool   Musculoskeletal: Normal range of motion. She exhibits no edema. Neurological: She is alert and oriented to person, place, and time. No cranial nerve deficit. She exhibits normal muscle tone. Coordination normal.   Skin: Skin is warm and dry. No rash noted. Psychiatric: She has a normal mood and affect. Her behavior is normal.   Nursing note and vitals reviewed. Note written by Rosalino Luong. Liane Lewis, as dictated by Susannah Little MD 3:51 AM        [facility]      ED Course       Procedures    ED EKG interpretation:  Rhythm: ventricular paced rhythm with occasional PVCs and regular . Rate (approx.): 66 bpm; Axis: normal; ST/T wave: no STEMI  Note written by Rosalino Luong. Liane Lewis, as dictated by Susannah Little MD 3:40 AM    I consulted Dr. Sin Mueller via [employer] at 7659. Discussed the case and he accepted the pt for admission.

## 2018-08-27 NOTE — PROGRESS NOTES
Admission Medication Reconciliation:    Information obtained from:  Patient/Med list/RxQuery    Comments/Recommendations: Updated PTA meds/reviewed patient's allergies. 1)  Patient may not be a reliable historian as she was unable to confirm/deny when asked about meds not on her med list.    2)  Medication changes (since last review): Adjusted  -albuterol inhaler (every 6-8 H)  -eliquis 5 mg (twice daily)  -linacotide (1 cap daily)  -metformin 500 mg (2 tabs BID)  -myrbetriq 50 mg (1 tab every evening)  -sucralfate 1 gram (twice daily)  -timolol 0.5% (1 drop both eyes daily)    Removed  -clindamycin 150 mg  -tramadol 50 mg     3)  Patient stated she completed clindamycin regimen (per RxQuery-prescribed on  for 10 day course) but does not know why it was prescribed. 4)  Medications patient unsure if taking: azelastine, dicyclomine, esomeprazole, glipizide, lactulose, methenamine, montelukast). Allergies:  Review of patient's allergies indicates no known allergies. Significant PMH/Disease States:   Past Medical History:   Diagnosis Date    Bradycardia     Diabetes ([facility] [address]     Hyperlipidemia     PUD (peptic ulcer disease)        Chief Complaint for this Admission:    Chief Complaint   Patient presents with    Abdominal Pain    Vomiting       Prior to Admission Medications:   Prior to Admission Medications   Prescriptions Last Dose Informant Patient Reported? Taking? Azelastine (ASTEPRO) 0.15 % (205.5 mcg) nasal spray Unknown at Unknown time  No No   Si Sprays by Both Nostrils route two (2) times a day. ELIQUIS 5 mg tablet 2018 at Unknown time  Yes Yes   Si mg two (2) times a day. MYRBETRIQ 50 mg ER tablet 2018 at Unknown time  Yes Yes   Si mg every evening. acetaminophen-codeine (TYLENOL #3) 300-30 mg per tablet   Yes Yes   Sig: daily as needed.    albuterol (PROVENTIL HFA, VENTOLIN HFA, PROAIR HFA) 90 mcg/actuation inhaler   Yes Yes   Sig: Take 2 Puffs by inhalation. Every 6-8 hours as needed   aspirin delayed-release 81 mg tablet 8/26/2018 at Unknown time  Yes Yes   Sig: Take 81 mg by mouth daily. brinzolamide (AZOPT) 1 % ophthalmic suspension 8/26/2018 at Unknown time  Yes Yes   Sig: Administer 1 Drop to both eyes three (3) times daily. cholecalciferol (VITAMIN D3) 400 unit tab tablet   Yes Yes   Sig: Take 400 Units by mouth two (2) times a day. diazepam (VALIUM) 2 mg tablet   No Yes   Sig: Take 1 Tab by mouth nightly as needed for Anxiety. dicyclomine (BENTYL) 20 mg tablet Unknown at Unknown time  Yes No   Sig: TAKE 1 TABLET BY MOUTH FOUR TIMES A DAY   esomeprazole (NEXIUM) 40 mg capsule Unknown at Unknown time  Yes No   Sig: Take  by mouth daily. ferrous sulfate (IRON) 325 mg (65 mg iron) tablet 8/26/2018 at Unknown time  Yes Yes   Sig: Take  by mouth Daily (before breakfast). fluticasone (FLONASE) 50 mcg/actuation nasal spray   Yes Yes   glipiZIDE SR (GLUCOTROL XL) 10 mg CR tablet Unknown at Unknown time  No No   Sig: Take 1 Tab by mouth daily. Indications: type 2 diabetes mellitus   hydrochlorothiazide (HYDRODIURIL) 25 mg tablet   Yes Yes   Sig: Take 25 mg by mouth daily. insulin detemir U-100 (LEVEMIR FLEXTOUCH) 100 unit/mL (3 mL) inpn 8/26/2018 at Unknown time  No Yes   Sig: Inject 18 units daily   lactulose (ENULOSE) 10 gram/15 mL solution Unknown at Unknown time  Yes No   Sig: Take  by mouth three (3) times daily. latanoprost (XALATAN) 0.005 % ophthalmic solution 8/26/2018 at Unknown time  Yes Yes   Sig: Administer 1 Drop to both eyes nightly. linaclotide (LINZESS PO) 8/26/2018 at Unknown time  Yes Yes   Sig: Take 1 Cap by mouth daily. losartan (COZAAR) 50 mg tablet   No Yes   Sig: Take 1 Tab by mouth daily. metFORMIN (GLUCOPHAGE) 500 mg tablet 8/26/2018 at Unknown time  Yes Yes   Sig: Take 1,000 mg by mouth two (2) times daily (with meals).    methenamine hippurate (HIPREX) 1 gram tablet Unknown at Unknown time  Yes No   Sig: Take 1 g by mouth two (2) times daily (with meals). montelukast (SINGULAIR) 10 mg tablet Unknown at Unknown time  Yes No   Sig: Take 10 mg by mouth daily. pioglitazone (ACTOS) 30 mg tablet 2018 at Unknown time  No Yes   Sig: Take 1 Tab by mouth daily. simvastatin (ZOCOR) 20 mg tablet 2018 at Unknown time  No Yes   Sig: Take 1 Tab by mouth nightly. sucralfate (CARAFATE) 1 gram tablet 2018 at Unknown time  Yes Yes   Si g two (2) times a day. timolol (TIMOPTIC) 0.5 % ophthalmic solution   Yes Yes   Sig: Administer 1 Drop to both eyes daily.       Facility-Administered Medications: None

## 2018-08-27 NOTE — H&P
[address] [facility]  HISTORY AND PHYSICAL      Sonia Ward.  MR#: [id number]  : 1940  ACCOUNT #: [de-identified]   ADMIT DATE: 2018    PRIMARY CARE PHYSICIAN:  Giovanni Arias MD    SOURCE OF INFORMATION:  Patient. CHIEF COMPLAINT:  Abdominal pain. HISTORY OF PRESENT ILLNESS:  This is an 23-year-old woman with a past medical history significant for type 2 diabetes, dyslipidemia, coronary artery disease status post CABG, status post pacemaker insertion, was in her usual state of health until  of this year when the patient developed abdominal pain. The abdominal pain is located at the lower abdomen, is constant with radiation to the back, 10/10 in severity. Patient described the abdominal pain as cramping. No known aggravating or relieving factors, was seen by primary care physician and was also seen by the gastroenterologist.  Patient was treated with antacid without any significant relief. Patient's abdominal pain is associated with nausea and vomiting. She came to the emergency room today because of worsening symptoms. When the patient arrived at the emergency room, CT scan of the abdomen and pelvis was obtained. The CT scan shows findings worrisome for metastatic pancreatic adenocarcinoma. The patient also has a significant anemia and was guaiac positive. No history of fever, no rigors, no chills. She was referred to the hospitalist service for evaluation for admission. PAST MEDICAL HISTORY:  Diabetes, dyslipidemia, coronary artery disease status post  CABG, hypertension, status post pacemaker insertion. ALLERGIES:  NO KNOWN DRUG ALLERGIES.     MEDICATIONS:  Tylenol with codeine dosage as directed, Albuterol 90 mcg 2 puffs by inhalation every 4 hours as needed, aspirin 81 mg daily, Azopt 1% ophthalmic solution 1 drop into each eye 3 times daily, Bentyl 20 mg 4 times daily, Eliquis 5 mg daily, Nexium 40 mg daily, ferrous sulfate 325 mg 1 tablet daily, Glucotrol-XL 10 mg daily, hydrochlorothiazide 25 mg daily, Levemir 80 units subcutaneously daily, losartan 50 mg daily, Glucophage 1000 mg twice daily, Singulair 10 mg daily, Actos 30 mg daily, Zocor 20 mg daily, Carafate 1 gram dosage as directed, tramadol 50 mg every 8 hours as needed. FAMILY HISTORY:  This was reviewed. Her mother had diabetes. Father had hypertension. PAST SURGICAL HISTORY:  Significant for CABG, cardiac pacemaker, cataract extraction. SOCIAL HISTORY:  No history of alcohol or tobacco abuse. REVIEW OF SYSTEMS:  HEAD, EYES, EARS, NOSE AND THROAT:  No headache, no dizziness, no blurring of vision, no photophobia. RESPIRATORY:  No cough, no shortness of breath, no hemoptysis. CARDIOVASCULAR:  No chest pain, no orthopnea, no palpitation. GASTROINTESTINAL:  This is positive for abdominal pain, nausea, vomiting, diarrhea. No constipation. GENITOURINARY:  No dysuria, no urgency, no frequency. All other systems are reviewed and they are negative. PHYSICAL EXAMINATION:  GENERAL APPEARANCE:  The patient appeared ill and in moderate distress. VITAL SIGNS:  On arrival to the emergency room, temperature 97.9, pulse 66, respiratory rate 16, blood pressure 111/45, oxygen saturation 100% on room air. HEAD:  Normocephalic, atraumatic. EYES:  Normal eye movement, no redness, no drainage, no discharge. EARS:  Normal external ears with no obvious drainage. NOSE:  No deformity, no drainage. MOUTH AND THROAT:  No visible oral lesion. NECK:  Supple, no JVD, no thyromegaly. CHEST:  Clear breath sounds. No wheezing, no crackles. HEART:  Normal S1 and S2 are regular. No clinically appreciable murmur. ABDOMEN:  Soft, nontender, normal bowel sounds. CENTRAL NERVOUS SYSTEM:  Alert, oriented x3. No gross focal neurological deficit. EXTREMITIES:  No edema. Pulses 2+ bilaterally. MUSCULOSKELETAL:  No obvious joint deformity or swelling.   SKIN:  No active skin lesions seen in the exposed part of the body. PSYCHIATRIC:  Normal mood and affect. LYMPHATIC SYSTEM:  No cervical lymphadenopathy. DIAGNOSTIC DATA:  EKG shows a pacemaker rhythm and nonspecific ST and T wave abnormalities. CT scan of the abdomen and pelvis without contrast shows findings concerning for metastatic pancreatic adenocarcinoma. LABORATORY DATA:  Chemistry:  Sodium 138, potassium 3.7, chloride 102, CO2 of 24, glucose 217, BUN 50, creatinine 1.00, calcium 8.9, bilirubin total 0.7, , , alkaline phosphatase 291, total protein 6.3, albumin level 3.4, globulin 2.9. Stool occult blood positive. Urinalysis: This is significant for negative nitrite, negative leuk esterase, negative bacteria. Hematology:  WBC 11.8, hemoglobin 5.9, hematocrit 18.2, platelet 455, lipase level 51. ASSESSMENT:  1. Suspected acute upper gastrointestinal bleed. 2.  Acute blood loss anemia. 3.  Type 2 diabetes. 4.  Dehydration. 5.  Abnormal liver function test.  6.  Suspected metastatic pancreatic adenocarcinoma. 7.  Multiple pulmonary nodules. 8.  Status post pacemaker insertion. 9.  Abdominal pain. 10.  Dyslipidemia. 11.  Hypertension. 12.  Coronary artery disease status post coronary artery bypass graft. PLAN:  1. Suspected acute upper GI bleed. We will admit the patient for further evaluation and treatment. We will start the patient on Protonix. Gastroenterology consult will be requested to assist in the evaluation and treatment. We will hold aspirin and Eliquis. 2.  Acute blood loss anemia. This is most likely coming from the suspected upper GI bleed. The cause may be coming from the suspected metastatic pancreatic cancer. We will transfuse the patient with 2 units of packed red blood cell. Will carry out anemia workup. 3.  Type 2 diabetes. The patient will be started on sliding scale with insulin coverage. We will check hemoglobin A1c level. 4.  Dehydration.   This is supported by elevated BUN and normal creatinine. Will carry out hydration with normal saline. 5.  Abnormal liver function test.  This is most likely as a result of the suspected metastatic pancreatic adenocarcinoma. Gastroenterology consult will be requested for this and also for the suspected acute upper GI bleed. 6.  Suspected metastatic pancreatic adenocarcinoma. Oncology consult will be requested. We will await further recommendation from the gastroenterologist.  Patient will need a tissue diagnosis. 7.  Multiple pulmonary nodules. This is based on the CT scan of the abdomen and pelvis results. We will obtain a CT scan of the chest for further evaluation. 8.  Status post pacemaker insertion. Patient is stable. We will continue to monitor. 9.  Abdominal pain. This is as a result of the suspected metastatic pancreatic adenocarcinoma. Will carry out pain control. 10.  Dyslipidemia. We will hold statin because of the abnormal liver function tests. 11.  Hypertension. We will resume preadmission medication except for hydrochlorothiazide because of the dehydration. 12.  Coronary artery disease, status post CABG. We will check cardiac markers. 13.  Other issues. CODE STATUS:  PATIENT IS A FULL CODE. We will request SCD for DVT prophylaxis.       MD RICHMOND Bailon/LIS  D: 08/27/2018 07:13     T: 08/27/2018 09:51  JOB #: [id number]  CC: Eloise Baxter MD

## 2018-08-27 NOTE — ED NOTES
Bedside and Written shift change report given to Dee Kee RN (oncoming nurse) by Reanna Palacio RN (offgoing nurse). Report included the following information SBAR, Kardex, ED Summary, [facility], Recent Results and Cardiac Rhythm NSR.     0959: Patient received breakfast tray. 1010: Patient assisted to bedside commode. Pt tolerated well. 1015: Pt NPO status. 1117: TRANSFER - OUT REPORT:    Verbal report given to Sudhir Victoria RN (name) on Mark Julian  being transferred to  (unit) for routine progression of care       Report consisted of patients Situation, Background, Assessment and   Recommendations(SBAR). Information from the following report(s) SBAR, Kardex, ED Summary, MAR, Recent Results and Cardiac Rhythm NSR was reviewed with the receiving nurse. Lines:   Peripheral IV 08/27/18 Right Antecubital (Active)   Site Assessment Clean, dry, & intact 8/27/2018  3:39 AM   Phlebitis Assessment 0 8/27/2018  3:39 AM   Infiltration Assessment 0 8/27/2018  3:39 AM   Dressing Status Clean, dry, & intact 8/27/2018  3:39 AM   Dressing Type Transparent 8/27/2018  3:39 AM       Peripheral IV 08/27/18 Left Antecubital (Active)   Site Assessment Clean, dry, & intact 8/27/2018  8:23 AM   Phlebitis Assessment 0 8/27/2018  8:23 AM   Infiltration Assessment 0 8/27/2018  8:23 AM   Dressing Status Clean, dry, & intact 8/27/2018  8:23 AM   Dressing Type Tape;Transparent 8/27/2018  8:23 AM   Hub Color/Line Status Pink;Capped;Flushed;Patent 8/27/2018  8:23 AM   Action Taken Blood drawn 8/27/2018  8:23 AM        Opportunity for questions and clarification was provided.       Patient transported with:   [employer]

## 2018-08-27 NOTE — ED NOTES
0405 - Pt updated on plan of care. Pt agreeable to receiving blood transfusion. 0430 - Pt assisted to bedside commode. Pt not c/o dizziness and was steady on her feet to pivot to the commode. 0515 - Pt resting quietly in bed. Call bell within reach. 0600 - Pt updated on plan of care and given mouth swabs. 0730 - Pt resting quietly in bed. Call bell within reach. 0740 - Bedside shift change report given to Mandie Best RN (oncoming nurse) by Epifanio Freitas RN (offgoing nurse). Report included the following information SBAR, ED Summary, MAR and Recent Results.

## 2018-08-27 NOTE — PROGRESS NOTES
Reason for Admission:   Multiple Lesions of Metastic Malignancy. Patient seen in the ED. RRAT Score:    31   Resources/supports as identified by patient/family:   Family and [address] ([employer])  Top Challenges facing patient (as identified by patient/family and CM):  Health Challenges                   Finances/Medication cost?     Patient receives SS as source of income and reports no significant financial stressors or concerns. Pharmacy utilized for prescriptions is [facility] located off of [address] and [address]. Transportation? Medicaid Transport [facility])       Support system or lack thereof? Identified support is patient's daughter, Antonieta Julian [phone]/ [phone]       Living arrangements? Patient resides in her own apartment alone at THE [facility]        Self-care/ADLs/Cognition? Patient receives assistance with ADL's and IADL's Tues-Sat from 10:30 am-4:30 pm from [facility]. DME in the home consist of walker and shower chair. Patient alert and oriented x4. Needs direction to stay on subject. Current Advanced Directive/Advance Care Plan:  Patient is a full code with no advance directives on file. Will review at a later time. Plan for utilizing home health:    TBD/subject to change pending recommendations                      Likelihood of readmission:  LOW                 Transition of Care Plan:    Anticipated plan is for patient to discharge home with family assistance. There are no current CM consults or needs at this time. Disposition needs TBD/subject to change pending recommendations. CM will continue to follow and assist with disposition needs as they arise. Care Management Interventions  PCP Verified by CM:  Yes (Patient reported that she last saw PCP on 8/24)  Last Visit to PCP: 08/24/18  Palliative Care Criteria Met (RRAT>21 & CHF Dx)?: No  Mode of Transport at Discharge: BLS  Transition of Care Consult (CM Consult):  (There are no current CM consults or needs at this time)  Discharge Durable Medical Equipment: No  Physical Therapy Consult: No  Occupational Therapy Consult: No  Speech Therapy Consult: No  Current Support Network: Own Home, Lives Alone ([address])  Confirm Follow Up Transport: Other (see comment) (Medicaid [address])  Plan discussed with Pt/Family/Caregiver: Yes  Freedom of Choice Offered: Yes  Discharge Location  Discharge Placement: Home with family assistance (TBD.subject to change pending recommendations)    KRISHNA Robles/NAOMI  11:11 AM

## 2018-08-27 NOTE — IP AVS SNAPSHOT
[address] 
[phone] Patient: Deshawn Spencer MRN: [id number] CQV:[birth year] A check gwendolyn indicates which time of day the medication should be taken. My Medications CHANGE how you take these medications Instructions Each Dose to Equal  
 Morning Noon Evening Bedtime  
 insulin detemir U-100 100 unit/mL (3 mL) Inpn Commonly known as:  Rita Anand What changed:   
- how much to take 
- how to take this - when to take this Your last dose was: Your next dose is:    
   
   
 5 Units by SubCUTAneous route nightly. Inject 18 units daily 5 Units LINZESS 145 mcg Cap capsule Generic drug:  linaclotide What changed:  Another medication with the same name was removed. Continue taking this medication, and follow the directions you see here. Your last dose was: Your next dose is: Take 145 mcg by mouth daily. 145 mcg CONTINUE taking these medications Instructions Each Dose to Equal  
 Morning Noon Evening Bedtime  
 acetaminophen-codeine 300-30 mg per tablet Commonly known as:  TYLENOL #3 Your last dose was: Your next dose is:    
   
   
 daily as needed. Azelastine 0.15 % (205.5 mcg) nasal spray Commonly known as:  ASTEPRO Your last dose was: Your next dose is: 2 Sprays by Both Nostrils route two (2) times a day. 2 Spray AZOPT 1 % ophthalmic suspension Generic drug:  brinzolamide Your last dose was: Your next dose is:    
   
   
 Administer 1 Drop to both eyes three (3) times daily. 1 Drop ENULOSE 10 gram/15 mL solution Generic drug:  lactulose Your last dose was: Your next dose is: Take  by mouth three (3) times daily. hydroCHLOROthiazide 25 mg tablet Commonly known as:  HYDRODIURIL Your last dose was: Your next dose is: Take 25 mg by mouth daily. 25 mg Iron 325 mg (65 mg iron) tablet Generic drug:  ferrous sulfate Your last dose was: Your next dose is: Take  by mouth Daily (before breakfast). losartan 50 mg tablet Commonly known as:  COZAAR Your last dose was: Your next dose is: Take 1 Tab by mouth daily. 50 mg NexIUM 40 mg capsule Generic drug:  esomeprazole Your last dose was: Your next dose is: Take  by mouth daily. simvastatin 20 mg tablet Commonly known as:  ZOCOR Your last dose was: Your next dose is: Take 1 Tab by mouth nightly. 20 mg  
    
   
   
   
  
 SINGULAIR 10 mg tablet Generic drug:  montelukast  
   
Your last dose was: Your next dose is: Take 10 mg by mouth daily. 10 mg  
    
   
   
   
  
 sucralfate 1 gram tablet Commonly known as:  Beather Bun Your last dose was: Your next dose is:    
   
   
 1 g two (2) times a day. 1 g  
    
   
   
   
  
 timolol 0.5 % ophthalmic solution Commonly known as:  TIMOPTIC Your last dose was: Your next dose is:    
   
   
 Administer 1 Drop to both eyes daily. 1 Drop VITAMIN D3 400 unit Tab tablet Generic drug:  cholecalciferol Your last dose was: Your next dose is: Take 400 Units by mouth two (2) times a day. 400 Units XALATAN 0.005 % ophthalmic solution Generic drug:  latanoprost  
   
Your last dose was: Your next dose is:    
   
   
 Administer 1 Drop to both eyes nightly. 1 Drop STOP taking these medications albuterol 90 mcg/actuation inhaler Commonly known as:  PROVENTIL HFA, VENTOLIN HFA, PROAIR HFA  
   
  
 aspirin delayed-release 81 mg tablet  
   
  
 diazePAM 2 mg tablet Commonly known as:  VALIUM  
   
  
 dicyclomine 20 mg tablet Commonly known as:  BENTYL  
   
  
 ELIQUIS 5 mg tablet Generic drug:  apixaban  
   
  
 fluticasone 50 mcg/actuation nasal spray Commonly known as:  FLONASE  
   
  
 glipiZIDE SR 10 mg CR tablet Commonly known as:  GLUCOTROL XL  
   
  
 metFORMIN 1,000 mg tablet Commonly known as:  GLUCOPHAGE  
   
  
 metFORMIN 500 mg tablet Commonly known as:  GLUCOPHAGE  
   
  
 methenamine hippurate 1 gram tablet Commonly known as:  HIPREX MYRBETRIQ 50 mg ER tablet Generic drug:  mirabegron ER  
   
  
 pioglitazone 30 mg tablet Commonly known as:  ACTOS Where to Get Your Medications These medications were sent to [facility]/pharmacy #[id number] [address] Phone:  [phone]  
  insulin detemir U-100 100 unit/mL (3 mL) Inpn

## 2018-08-28 ENCOUNTER — ANESTHESIA EVENT (OUTPATIENT)
Dept: ENDOSCOPY | Age: 78
DRG: 435 | End: 2018-08-28
Payer: MEDICARE

## 2018-08-28 ENCOUNTER — ANESTHESIA (OUTPATIENT)
Dept: ENDOSCOPY | Age: 78
DRG: 435 | End: 2018-08-28
Payer: MEDICARE

## 2018-08-28 LAB
ALBUMIN SERPL-MCNC: 3.1 G/DL (ref 3.5–5)
ALBUMIN/GLOB SERPL: 1.1 {RATIO} (ref 1.1–2.2)
ALP SERPL-CCNC: 224 U/L (ref 45–117)
ALT SERPL-CCNC: 296 U/L (ref 12–78)
ANION GAP SERPL CALC-SCNC: 12 MMOL/L (ref 5–15)
AST SERPL-CCNC: 169 U/L (ref 15–37)
BASOPHILS # BLD: 0 K/UL (ref 0–0.1)
BASOPHILS NFR BLD: 0 % (ref 0–1)
BILIRUB SERPL-MCNC: 0.7 MG/DL (ref 0.2–1)
BUN SERPL-MCNC: 30 MG/DL (ref 6–20)
BUN/CREAT SERPL: 43 (ref 12–20)
CALCIUM SERPL-MCNC: 8.9 MG/DL (ref 8.5–10.1)
CEA SERPL-MCNC: 4 NG/ML
CHLORIDE SERPL-SCNC: 106 MMOL/L (ref 97–108)
CHOLEST SERPL-MCNC: 82 MG/DL
CO2 SERPL-SCNC: 23 MMOL/L (ref 21–32)
CREAT SERPL-MCNC: 0.7 MG/DL (ref 0.55–1.02)
DIFFERENTIAL METHOD BLD: ABNORMAL
EOSINOPHIL # BLD: 0 K/UL (ref 0–0.4)
EOSINOPHIL NFR BLD: 0 % (ref 0–7)
ERYTHROCYTE [DISTWIDTH] IN BLOOD BY AUTOMATED COUNT: 16.2 % (ref 11.5–14.5)
GLOBULIN SER CALC-MCNC: 2.9 G/DL (ref 2–4)
GLUCOSE BLD STRIP.AUTO-MCNC: 107 MG/DL (ref 65–100)
GLUCOSE BLD STRIP.AUTO-MCNC: 156 MG/DL (ref 65–100)
GLUCOSE BLD STRIP.AUTO-MCNC: 178 MG/DL (ref 65–100)
GLUCOSE BLD STRIP.AUTO-MCNC: 229 MG/DL (ref 65–100)
GLUCOSE SERPL-MCNC: 153 MG/DL (ref 65–100)
HCT VFR BLD AUTO: 23.1 % (ref 35–47)
HDLC SERPL-MCNC: 38 MG/DL
HDLC SERPL: 2.2 {RATIO} (ref 0–5)
HGB BLD-MCNC: 7.7 G/DL (ref 11.5–16)
IMM GRANULOCYTES # BLD: 0.1 K/UL (ref 0–0.04)
IMM GRANULOCYTES NFR BLD AUTO: 1 % (ref 0–0.5)
LDLC SERPL CALC-MCNC: 27.6 MG/DL (ref 0–100)
LIPID PROFILE,FLP: NORMAL
LYMPHOCYTES # BLD: 1.9 K/UL (ref 0.8–3.5)
LYMPHOCYTES NFR BLD: 21 % (ref 12–49)
MCH RBC QN AUTO: 32 PG (ref 26–34)
MCHC RBC AUTO-ENTMCNC: 33.3 G/DL (ref 30–36.5)
MCV RBC AUTO: 95.9 FL (ref 80–99)
MONOCYTES # BLD: 0.7 K/UL (ref 0–1)
MONOCYTES NFR BLD: 8 % (ref 5–13)
NEUTS SEG # BLD: 6.3 K/UL (ref 1.8–8)
NEUTS SEG NFR BLD: 70 % (ref 32–75)
NRBC # BLD: 0.03 K/UL (ref 0–0.01)
NRBC BLD-RTO: 0.3 PER 100 WBC
PLATELET # BLD AUTO: 137 K/UL (ref 150–400)
PMV BLD AUTO: 10.5 FL (ref 8.9–12.9)
POTASSIUM SERPL-SCNC: 3.4 MMOL/L (ref 3.5–5.1)
PROT SERPL-MCNC: 6 G/DL (ref 6.4–8.2)
RBC # BLD AUTO: 2.41 M/UL (ref 3.8–5.2)
SERVICE CMNT-IMP: ABNORMAL
SODIUM SERPL-SCNC: 141 MMOL/L (ref 136–145)
TRIGL SERPL-MCNC: 82 MG/DL (ref ?–150)
VLDLC SERPL CALC-MCNC: 16.4 MG/DL
WBC # BLD AUTO: 9 K/UL (ref 3.6–11)

## 2018-08-28 PROCEDURE — 74011250637 HC RX REV CODE- 250/637: Performed by: INTERNAL MEDICINE

## 2018-08-28 PROCEDURE — 74011250636 HC RX REV CODE- 250/636: Performed by: INTERNAL MEDICINE

## 2018-08-28 PROCEDURE — 36415 COLL VENOUS BLD VENIPUNCTURE: CPT | Performed by: INTERNAL MEDICINE

## 2018-08-28 PROCEDURE — 76060000032 HC ANESTHESIA 0.5 TO 1 HR: Performed by: INTERNAL MEDICINE

## 2018-08-28 PROCEDURE — 80061 LIPID PANEL: CPT | Performed by: INTERNAL MEDICINE

## 2018-08-28 PROCEDURE — 86301 IMMUNOASSAY TUMOR CA 19-9: CPT | Performed by: INTERNAL MEDICINE

## 2018-08-28 PROCEDURE — 65660000000 HC RM CCU STEPDOWN

## 2018-08-28 PROCEDURE — 80053 COMPREHEN METABOLIC PANEL: CPT | Performed by: INTERNAL MEDICINE

## 2018-08-28 PROCEDURE — 77030009426 HC FCPS BIOP ENDOSC BSC -B: Performed by: INTERNAL MEDICINE

## 2018-08-28 PROCEDURE — 76040000007: Performed by: INTERNAL MEDICINE

## 2018-08-28 PROCEDURE — 74011636637 HC RX REV CODE- 636/637: Performed by: INTERNAL MEDICINE

## 2018-08-28 PROCEDURE — 82378 CARCINOEMBRYONIC ANTIGEN: CPT | Performed by: INTERNAL MEDICINE

## 2018-08-28 PROCEDURE — 74011250636 HC RX REV CODE- 250/636

## 2018-08-28 PROCEDURE — C9113 INJ PANTOPRAZOLE SODIUM, VIA: HCPCS | Performed by: INTERNAL MEDICINE

## 2018-08-28 PROCEDURE — 85025 COMPLETE CBC W/AUTO DIFF WBC: CPT | Performed by: INTERNAL MEDICINE

## 2018-08-28 PROCEDURE — 74011000250 HC RX REV CODE- 250: Performed by: INTERNAL MEDICINE

## 2018-08-28 PROCEDURE — 88305 TISSUE EXAM BY PATHOLOGIST: CPT | Performed by: INTERNAL MEDICINE

## 2018-08-28 PROCEDURE — 82962 GLUCOSE BLOOD TEST: CPT

## 2018-08-28 PROCEDURE — 0DB98ZX EXCISION OF DUODENUM, VIA NATURAL OR ARTIFICIAL OPENING ENDOSCOPIC, DIAGNOSTIC: ICD-10-PCS | Performed by: INTERNAL MEDICINE

## 2018-08-28 RX ORDER — SODIUM CHLORIDE 0.9 % (FLUSH) 0.9 %
5-10 SYRINGE (ML) INJECTION AS NEEDED
Status: ACTIVE | OUTPATIENT
Start: 2018-08-28 | End: 2018-08-28

## 2018-08-28 RX ORDER — SODIUM CHLORIDE 9 MG/ML
50 INJECTION, SOLUTION INTRAVENOUS CONTINUOUS
Status: DISPENSED | OUTPATIENT
Start: 2018-08-28 | End: 2018-08-28

## 2018-08-28 RX ORDER — SODIUM CHLORIDE 0.9 % (FLUSH) 0.9 %
5-10 SYRINGE (ML) INJECTION EVERY 8 HOURS
Status: ACTIVE | OUTPATIENT
Start: 2018-08-28 | End: 2018-08-28

## 2018-08-28 RX ORDER — EPINEPHRINE 0.1 MG/ML
1 INJECTION INTRACARDIAC; INTRAVENOUS
Status: DISCONTINUED | OUTPATIENT
Start: 2018-08-28 | End: 2018-08-28 | Stop reason: HOSPADM

## 2018-08-28 RX ORDER — NALOXONE HYDROCHLORIDE 0.4 MG/ML
0.4 INJECTION, SOLUTION INTRAMUSCULAR; INTRAVENOUS; SUBCUTANEOUS
Status: ACTIVE | OUTPATIENT
Start: 2018-08-28 | End: 2018-08-28

## 2018-08-28 RX ORDER — LIDOCAINE HYDROCHLORIDE 20 MG/ML
INJECTION, SOLUTION EPIDURAL; INFILTRATION; INTRACAUDAL; PERINEURAL AS NEEDED
Status: DISCONTINUED | OUTPATIENT
Start: 2018-08-28 | End: 2018-08-28 | Stop reason: HOSPADM

## 2018-08-28 RX ORDER — FLUMAZENIL 0.1 MG/ML
0.2 INJECTION INTRAVENOUS
Status: ACTIVE | OUTPATIENT
Start: 2018-08-28 | End: 2018-08-28

## 2018-08-28 RX ORDER — DEXTROMETHORPHAN/PSEUDOEPHED 2.5-7.5/.8
1.2 DROPS ORAL
Status: DISCONTINUED | OUTPATIENT
Start: 2018-08-28 | End: 2018-08-28 | Stop reason: HOSPADM

## 2018-08-28 RX ORDER — PROPOFOL 10 MG/ML
INJECTION, EMULSION INTRAVENOUS AS NEEDED
Status: DISCONTINUED | OUTPATIENT
Start: 2018-08-28 | End: 2018-08-28 | Stop reason: HOSPADM

## 2018-08-28 RX ORDER — MIDAZOLAM HYDROCHLORIDE 1 MG/ML
.25-5 INJECTION, SOLUTION INTRAMUSCULAR; INTRAVENOUS
Status: DISCONTINUED | OUTPATIENT
Start: 2018-08-28 | End: 2018-08-28 | Stop reason: HOSPADM

## 2018-08-28 RX ORDER — FENTANYL CITRATE 50 UG/ML
100 INJECTION, SOLUTION INTRAMUSCULAR; INTRAVENOUS
Status: ACTIVE | OUTPATIENT
Start: 2018-08-28 | End: 2018-08-28

## 2018-08-28 RX ORDER — SODIUM CHLORIDE 9 MG/ML
INJECTION, SOLUTION INTRAVENOUS
Status: DISCONTINUED | OUTPATIENT
Start: 2018-08-28 | End: 2018-08-28 | Stop reason: HOSPADM

## 2018-08-28 RX ORDER — FLUMAZENIL 0.1 MG/ML
0.2 INJECTION INTRAVENOUS
Status: DISCONTINUED | OUTPATIENT
Start: 2018-08-28 | End: 2018-08-28 | Stop reason: HOSPADM

## 2018-08-28 RX ORDER — FENTANYL CITRATE 50 UG/ML
100 INJECTION, SOLUTION INTRAMUSCULAR; INTRAVENOUS
Status: DISCONTINUED | OUTPATIENT
Start: 2018-08-28 | End: 2018-08-28 | Stop reason: HOSPADM

## 2018-08-28 RX ORDER — ATROPINE SULFATE 0.1 MG/ML
0.5 INJECTION INTRAVENOUS
Status: DISCONTINUED | OUTPATIENT
Start: 2018-08-28 | End: 2018-08-28 | Stop reason: HOSPADM

## 2018-08-28 RX ORDER — NALOXONE HYDROCHLORIDE 0.4 MG/ML
0.4 INJECTION, SOLUTION INTRAMUSCULAR; INTRAVENOUS; SUBCUTANEOUS
Status: DISCONTINUED | OUTPATIENT
Start: 2018-08-28 | End: 2018-08-28 | Stop reason: HOSPADM

## 2018-08-28 RX ORDER — MIDAZOLAM HYDROCHLORIDE 1 MG/ML
.25-5 INJECTION, SOLUTION INTRAMUSCULAR; INTRAVENOUS
Status: ACTIVE | OUTPATIENT
Start: 2018-08-28 | End: 2018-08-28

## 2018-08-28 RX ADMIN — PROPOFOL 30 MG: 10 INJECTION, EMULSION INTRAVENOUS at 14:56

## 2018-08-28 RX ADMIN — Medication 10 ML: at 07:54

## 2018-08-28 RX ADMIN — PROPOFOL 40 MG: 10 INJECTION, EMULSION INTRAVENOUS at 14:53

## 2018-08-28 RX ADMIN — INSULIN LISPRO 2 UNITS: 100 INJECTION, SOLUTION INTRAVENOUS; SUBCUTANEOUS at 11:59

## 2018-08-28 RX ADMIN — PROPOFOL 30 MG: 10 INJECTION, EMULSION INTRAVENOUS at 14:54

## 2018-08-28 RX ADMIN — SODIUM CHLORIDE 40 MG: 9 INJECTION INTRAMUSCULAR; INTRAVENOUS; SUBCUTANEOUS at 21:26

## 2018-08-28 RX ADMIN — INSULIN LISPRO 2 UNITS: 100 INJECTION, SOLUTION INTRAVENOUS; SUBCUTANEOUS at 22:00

## 2018-08-28 RX ADMIN — INSULIN LISPRO 2 UNITS: 100 INJECTION, SOLUTION INTRAVENOUS; SUBCUTANEOUS at 07:53

## 2018-08-28 RX ADMIN — DORZOLAMIDE HCL 1 DROP: 20 SOLUTION/ DROPS OPHTHALMIC at 09:56

## 2018-08-28 RX ADMIN — DORZOLAMIDE HCL 1 DROP: 20 SOLUTION/ DROPS OPHTHALMIC at 17:20

## 2018-08-28 RX ADMIN — LIDOCAINE HYDROCHLORIDE 40 MG: 20 INJECTION, SOLUTION EPIDURAL; INFILTRATION; INTRACAUDAL; PERINEURAL at 14:53

## 2018-08-28 RX ADMIN — SODIUM CHLORIDE 40 MG: 9 INJECTION INTRAMUSCULAR; INTRAVENOUS; SUBCUTANEOUS at 09:57

## 2018-08-28 RX ADMIN — SODIUM CHLORIDE: 9 INJECTION, SOLUTION INTRAVENOUS at 14:51

## 2018-08-28 RX ADMIN — LOSARTAN POTASSIUM 50 MG: 50 TABLET ORAL at 09:57

## 2018-08-28 RX ADMIN — TIMOLOL MALEATE 1 DROP: 5 SOLUTION OPHTHALMIC at 09:56

## 2018-08-28 RX ADMIN — Medication 10 ML: at 06:00

## 2018-08-28 RX ADMIN — DORZOLAMIDE HCL 1 DROP: 20 SOLUTION/ DROPS OPHTHALMIC at 21:28

## 2018-08-28 RX ADMIN — Medication 10 ML: at 21:26

## 2018-08-28 RX ADMIN — AZELASTINE HYDROCHLORIDE 2 SPRAY: 137 SPRAY, METERED NASAL at 09:56

## 2018-08-28 RX ADMIN — LATANOPROST 1 DROP: 50 SOLUTION OPHTHALMIC at 21:28

## 2018-08-28 RX ADMIN — AZELASTINE HYDROCHLORIDE 2 SPRAY: 137 SPRAY, METERED NASAL at 17:20

## 2018-08-28 NOTE — PROGRESS NOTES
CM reviewed chart and noted transfer to [address] [place] [address]. Pt lives in her own independent apartment at THE [facility]. Pt has Medicaid Personal Caregivers Tuesday-Saturday from 10:30am-4:30pm. CM will continue to follow for PT/OT recommendations, as well as Oncology's treatment plan?  
SHELLY Mitchell, ACM

## 2018-08-28 NOTE — PROCEDURES
[address], [address] [place] [address]               : Crystal Leslie MD    Referring Provider: Eboni Taylor MD    Sedation:  MAC anesthesia Propofol      Prior to the procedure its objectives, risks, consequences and alternatives were discussed with the patient who then elected to proceed. The patient had the opportunity to ask questions and those questions were answered. A physical exam was performed. The heart, lungs, and mental status were examined prior to the procedure and found to be satisfactory for conscious sedation and for the procedure. Conscious sedation was initiated by the physician. Continuous pulse oximetry and blood pressure monitoring were used throughout the procedure. After appropriate pharyngeal anesthesia, the endoscope was passed into the esophagus without difficulty. The proximal esophagus is normal as is the distal esophagus. The scope was passed into the stomach without difficulty. The fundus, body, antrum, pylorus, bulb  are unremarkable. There is a post bulbar ulcer with a clot and very prominent duodenal folds in the second portion of the duodenum with marked narrowing. I was unable to get thru the area. I am concerned about the pancreatic head lesion infiltrating the duodenum. Multiple biopsies were obtained. On slow withdrawal of the scope, no abnormality was noted. Retroflexion revealed normal cardia and fundus. She tolerated the procedure without complications. Specimen Biopsies of second portion of duodenum    Complications: None. EBL:  None.     Crystal Leslie MD  8/28/2018  3:13 PM

## 2018-08-28 NOTE — ROUTINE PROCESS
Barbara Loss 1940 
[id number] Situation: 
Verbal report received from: [facility] Procedure: Procedure(s): ESOPHAGOGASTRODUODENOSCOPY (EGD) ESOPHAGOGASTRODUODENAL (EGD) BIOPSY Background: 
 
Preoperative diagnosis: Gastrointestinal hemorrhage, unspecified gastrointestinal hemorrhage type [K92.2] Postoperative diagnosis: 1. Duodenal Ulcer 2. Prominent Folds in Second Part of Duodenum :  Dr. Baljit Scanlon Assistant(s): Endoscopy Technician-1: Eldon De Souza Endoscopy RN-1: Con Olszewski, RN Specimens:  
ID Type Source Tests Collected by Time Destination 1 : Second Portion of Duodenum Rule Out Infiltrated Cancer Preservative   Clemente Callejas MD 8/28/2018 1459 Pathology H. Pylori  no Assessment: 
Intra-procedure medications Anesthesia gave intra-procedure sedation and medications, see anesthesia flow sheet yes Intravenous fluids: NS@ Bren Glaze Vital signs stable Abdominal assessment: round and soft Recommendation: 
Discharge patient per MD order. Family or Friend Permission to share finding with family or friend yes

## 2018-08-28 NOTE — PROGRESS NOTES
Hospitalist Progress Note  Lore Ornelas MD  Answering service: [phone] OR [phone]      Date of Service:  2018  NAME:  Karin Barrett  :  1940  MRN:  [id number]      Admission Summary: This is an 49-year-old woman with a past medical history significant for type 2 diabetes, dyslipidemia, coronary artery disease status post CABG, status post pacemaker insertion, was in her usual state of health until  of this year when the patient developed abdominal pain. The abdominal pain is located at the lower abdomen, is constant with radiation to the back, 10/10 in severity. Patient described the abdominal pain as cramping.   No known aggravating or relieving factors, was seen by primary care physician and was also seen by the gastroenterologist.     Interval history / Subjective:   Scheduled for EGD and EUS     Assessment & Plan:     Acute blood loss anemia (POA) - currently getting 1st of 2 units PRBC transfusion  - likely due to metastatic pancreatic CA exacerbated by GI blood loss   - iron, B12, folate WNL  - check CBC post-transfusion hgb 7.7     GI bleed (POA) - FOBT +  - IV PPI BID  - pt denies hematochezia/melena  - keep NPO on IVF for now; resume home meds when appropriate  - GI consulted egd and eus planned for today  - hold ASA and apixaban     Prerenal azotemia (POA) - likely due to GI bleed  - continue IVF and BT  - hold home HCTZ     DM2 - last A1c 7.8 (2018); SSI  - hold home metformin, insulin detemir, linaclotide, pioglitazone, glipizide  - resume meds as appropriate     Elevated LFTs - check US     Metastatic pancreatic CA - Oncology consulted     Multiple b/l lung nodules (POA) - seen on CXR  - CTA - Multiple pulmonary nodules, likely metastases.     SSS s/p PPM - hold apixaban and ASA for now     DLD - hold home statin due to transaminitis     HTN - controlled on home meds; hold home HCTZ while on IVF     CAD s/p CABG - hold ASA (due to GIB) and statin (due to transaminitis)  - resumed home ARB  - no BB on PTA med list     Code status: Full possibly advance stage 4 metastatic pancreatic ca need detailed discussion by oncology to define [address] once diagnosis is established  DVT prophylaxis:SCD     Poor prognosis  Care Plan discussed with: Patient/Family and Nurse  Disposition: TBD     Hospital Problems  Date Reviewed: 8/27/2018          Codes Class Noted POA    * (Principal)Acute upper GI bleed ICD-10-CM: K92.2  ICD-9-CM: 578.9  8/27/2018 Yes                Review of Systems:   A comprehensive review of systems was negative. Vital Signs:    Last 24hrs VS reviewed since prior progress note. Most recent are:  Visit Vitals    /72 (BP 1 Location: Right arm, BP Patient Position: At rest;Head of bed elevated (Comment degrees))    Pulse 65    Temp 97.9 °F (36.6 °C)    Resp 16    Ht 5' 3\" (1.6 m)    Wt 78.3 kg (172 lb 11.2 oz)    SpO2 100%    BMI 30.59 kg/m2       No intake or output data in the 24 hours ending 08/28/18 9759     Physical Examination:             Constitutional:  No acute distress, cooperative, pleasant    ENT:  Oral mucous moist, oropharynx benign. Neck supple,    Resp:  CTA bilaterally. No wheezing/rhonchi/rales. No accessory muscle use   CV:  Regular rhythm, normal rate, no murmurs, gallops, rubs    GI:  Soft, non distended, non tender. normoactive bowel sounds, no hepatosplenomegaly     Musculoskeletal:  No edema, warm, 2+ pulses throughout    Neurologic:  Moves all extremities.   AAOx3, CN II-XII reviewed            Data Review:    I personally reviewed  Image and labs      Labs:     Recent Labs      08/28/18 0448  08/27/18   0342   WBC  9.0  11.8*   HGB  7.7*  5.9*   HCT  23.1*  18.2*   PLT  137*  169     Recent Labs      08/28/18 0448  08/27/18   0342   NA  141  138   K  3.4*  3.7   CL  106  102   CO2  23  24   BUN  30*  50*   CREA  0.70  1.00   GLU  153*  217*   CA  8.9  8.9   MG   --   1.6 PHOS   --   2.1*     Recent Labs      08/28/18   0448  08/27/18 0342   SGOT  169*  423*   ALT  296*  444*   AP  224*  291*   TBILI  0.7  0.7   TP  6.0*  6.3*   ALB  3.1*  3.4*   GLOB  2.9  2.9   AML   --   25   LPSE   --   47*  51*     No results for input(s): INR, PTP, APTT in the last 72 hours. No lab exists for component: INREXT   Recent Labs      08/27/18 0342   TIBC  278   PSAT  38   FERR  120      Lab Results   Component Value Date/Time    Folate 25.3 (H) 08/27/2018 03:42 AM      No results for input(s): PH, PCO2, PO2 in the last 72 hours.   Recent Labs      08/27/18 0342   CPK  112   CKNDX  2.1   TROIQ  <0.05     Lab Results   Component Value Date/Time    Cholesterol, total 82 08/28/2018 04:48 AM    HDL Cholesterol 38 08/28/2018 04:48 AM    LDL, calculated 27.6 08/28/2018 04:48 AM    Triglyceride 82 08/28/2018 04:48 AM    CHOL/HDL Ratio 2.2 08/28/2018 04:48 AM     Lab Results   Component Value Date/Time    Glucose (POC) 178 (H) 08/28/2018 06:29 AM    Glucose (POC) 141 (H) 08/27/2018 10:21 PM    Glucose (POC) 235 (H) 08/27/2018 04:28 PM    Glucose (POC) 291 (H) 08/27/2018 12:27 PM    Glucose (POC) 227 (H) 08/27/2018 06:51 AM     Lab Results   Component Value Date/Time    Color YELLOW/STRAW 08/27/2018 04:37 AM    Appearance CLEAR 08/27/2018 04:37 AM    Specific gravity 1.019 08/27/2018 04:37 AM    pH (UA) 5.0 08/27/2018 04:37 AM    Protein NEGATIVE  08/27/2018 04:37 AM    Glucose NEGATIVE  08/27/2018 04:37 AM    Ketone TRACE (A) 08/27/2018 04:37 AM    Bilirubin NEGATIVE  08/27/2018 04:37 AM    Urobilinogen 0.2 08/27/2018 04:37 AM    Nitrites NEGATIVE  08/27/2018 04:37 AM    Leukocyte Esterase NEGATIVE  08/27/2018 04:37 AM    Epithelial cells FEW 08/27/2018 04:37 AM    Bacteria NEGATIVE  08/27/2018 04:37 AM    WBC 0-4 08/27/2018 04:37 AM    RBC 0-5 08/27/2018 04:37 AM         Medications Reviewed:     Current Facility-Administered Medications   Medication Dose Route Frequency    simethicone (MYLICON) 40mg/0.6mL oral drops 80 mg  1.2 mL Oral Multiple    atropine injection 0.5 mg  0.5 mg IntraVENous ONCE PRN    EPINEPHrine (ADRENALIN) 0.1 mg/mL syringe 1 mg  1 mg Endoscopically ONCE PRN    0.9% sodium chloride infusion 250 mL  250 mL IntraVENous PRN    0.9% sodium chloride infusion  125 mL/hr IntraVENous CONTINUOUS    pantoprazole (PROTONIX) 40 mg in sodium chloride 0.9% 10 mL injection  40 mg IntraVENous Q12H    azelastine (ASTELIN) 137mcg/spray nasal spray  2 Spray Both Nostrils BID    dorzolamide (TRUSOPT) 2 % ophthalmic solution 1 Drop  1 Drop Both Eyes TID    diazePAM (VALIUM) tablet 2 mg  2 mg Oral QHS PRN    ferrous sulfate tablet 325 mg  1 Tab Oral ACB    latanoprost (XALATAN) 0.005 % ophthalmic solution 1 Drop  1 Drop Both Eyes QHS    losartan (COZAAR) tablet 50 mg  50 mg Oral DAILY    timolol (TIMOPTIC) 0.5 % ophthalmic solution 1 Drop  1 Drop Both Eyes DAILY    sodium chloride (NS) flush 5-10 mL  5-10 mL IntraVENous Q8H    sodium chloride (NS) flush 5-10 mL  5-10 mL IntraVENous PRN    fentaNYL citrate (PF) injection 25 mcg  25 mcg IntraVENous Q4H PRN    ondansetron (ZOFRAN) injection 4 mg  4 mg IntraVENous Q4H PRN    insulin lispro (HUMALOG) injection   SubCUTAneous AC&HS    glucose chewable tablet 16 g  4 Tab Oral PRN    dextrose (D50W) injection syrg 12.5-25 g  12.5-25 g IntraVENous PRN    glucagon (GLUCAGEN) injection 1 mg  1 mg IntraMUSCular PRN     ______________________________________________________________________  EXPECTED LENGTH OF STAY: 3d 2h  ACTUAL LENGTH OF STAY:          1                 Lore Ornelas MD

## 2018-08-28 NOTE — PROGRESS NOTES

## 2018-08-28 NOTE — PROGRESS NOTES
Bedside and Verbal shift change report given to Ganga Stockton RN (oncoming nurse) by Ivone Rodriguez RN (offgoing nurse). Report included the following information SBAR, Kardex, MAR and Recent Results.

## 2018-08-28 NOTE — PROGRESS NOTES
Barbara Loss 1940 
[id number] Situation: 
 
Scheduled Procedure: Procedure(s): ESOPHAGOGASTRODUODENOSCOPY (EGD) Verbal report received from: Becky Cole RN 
Preoperative diagnosis: Gastrointestinal hemorrhage, unspecified gastrointestinal hemorrhage type [K92.2] Background: 
 
Procedure: Procedure(s): ESOPHAGOGASTRODUODENOSCOPY (EGD) Physician performing procedure; Dr. Baljit Scanlon SBAR QUESTIONS FLOOR TO ENDO RN 
 
NPO Status/Last PO Intake: 8/27 Is the patient taking Blood Thinners: yes Eliquis 8/26 Is the patient diabetic: 2 units for 156 @ 1140 Does the patient have a Pacemaker/Defibrillator in place?: pacemaker Does the patient need antibiotics before/during/after procedure: no 
Does the patient have SCD in place:no Assessment: 
Are the vital signs stable prior to patient coming to ENDO?  yes Is the patient alert/oriented and able to sign consent for the procedures:yes How does the patient's abdomen feel prior to coming to ENDO? round and soft yes Does the patient have a patient IV in place? yes Recommendation: 
Family or Friend present: yes Permission to share finding with Family or Friend: yes

## 2018-08-28 NOTE — PROGRESS NOTES
TRANSFER - OUT REPORT: 
 
Verbal report given to [facility] (name) on Wilson Backer  being transferred to (unit) for routine progression of care Report consisted of patients Situation, Background, Assessment and  
Recommendations(SBAR). Information from the following report(s) SBAR, Kardex, Procedure Summary, Intake/Output, MAR and Cardiac Rhythm Aflutter was reviewed with the receiving nurse. Lines:  
Peripheral IV 08/27/18 Right Antecubital (Active) Site Assessment Clean, dry, & intact 8/28/2018  9:55 AM  
Phlebitis Assessment 0 8/28/2018  9:55 AM  
Infiltration Assessment 0 8/28/2018  9:55 AM  
Dressing Status Clean, dry, & intact 8/28/2018  9:55 AM  
Dressing Type Transparent 8/28/2018  9:55 AM  
Hub Color/Line Status Capped 8/28/2018  9:55 AM  
Action Taken Open ports on tubing capped 8/27/2018  8:30 PM  
Alcohol Cap Used Yes 8/28/2018  9:55 AM  
   
Peripheral IV 08/27/18 Left Antecubital (Active) Site Assessment Clean, dry, & intact 8/28/2018  9:55 AM  
Phlebitis Assessment 0 8/28/2018  9:55 AM  
Infiltration Assessment 0 8/28/2018  9:55 AM  
Dressing Status Clean, dry, & intact 8/28/2018  9:55 AM  
Dressing Type Transparent 8/28/2018  9:55 AM  
Hub Color/Line Status Infusing 8/28/2018  9:55 AM  
Action Taken Open ports on tubing capped 8/27/2018  8:30 PM  
Alcohol Cap Used Yes 8/28/2018  9:55 AM  
  
 
Opportunity for questions and clarification was provided.

## 2018-08-28 NOTE — PROGRESS NOTES
Problem: Falls - Risk of  Goal: *Absence of Falls  Document Poli Fall Risk and appropriate interventions in the flowsheet.    Outcome: Progressing Towards Goal  Fall Risk Interventions:            Medication Interventions: Patient to call before getting OOB         History of Falls Interventions: Door open when patient unattended, Bed/chair exit alarm

## 2018-08-29 LAB
BASOPHILS # BLD: 0 K/UL (ref 0–0.1)
BASOPHILS NFR BLD: 0 % (ref 0–1)
CANCER AG19-9 SERPL-ACNC: 1 U/ML (ref 0–35)
DIFFERENTIAL METHOD BLD: ABNORMAL
EOSINOPHIL # BLD: 0.1 K/UL (ref 0–0.4)
EOSINOPHIL NFR BLD: 1 % (ref 0–7)
ERYTHROCYTE [DISTWIDTH] IN BLOOD BY AUTOMATED COUNT: 16.2 % (ref 11.5–14.5)
GLUCOSE BLD STRIP.AUTO-MCNC: 134 MG/DL (ref 65–100)
GLUCOSE BLD STRIP.AUTO-MCNC: 189 MG/DL (ref 65–100)
GLUCOSE BLD STRIP.AUTO-MCNC: 210 MG/DL (ref 65–100)
GLUCOSE BLD STRIP.AUTO-MCNC: 272 MG/DL (ref 65–100)
HCT VFR BLD AUTO: 23.2 % (ref 35–47)
HGB BLD-MCNC: 7.5 G/DL (ref 11.5–16)
IMM GRANULOCYTES # BLD: 0.1 K/UL (ref 0–0.04)
IMM GRANULOCYTES NFR BLD AUTO: 1 % (ref 0–0.5)
LYMPHOCYTES # BLD: 1.7 K/UL (ref 0.8–3.5)
LYMPHOCYTES NFR BLD: 21 % (ref 12–49)
MCH RBC QN AUTO: 31.5 PG (ref 26–34)
MCHC RBC AUTO-ENTMCNC: 32.3 G/DL (ref 30–36.5)
MCV RBC AUTO: 97.5 FL (ref 80–99)
MONOCYTES # BLD: 0.6 K/UL (ref 0–1)
MONOCYTES NFR BLD: 7 % (ref 5–13)
NEUTS SEG # BLD: 5.4 K/UL (ref 1.8–8)
NEUTS SEG NFR BLD: 70 % (ref 32–75)
NRBC # BLD: 0.06 K/UL (ref 0–0.01)
NRBC BLD-RTO: 0.8 PER 100 WBC
PLATELET # BLD AUTO: 138 K/UL (ref 150–400)
PLATELET COMMENTS,PCOM: ABNORMAL
PMV BLD AUTO: 10.1 FL (ref 8.9–12.9)
RBC # BLD AUTO: 2.38 M/UL (ref 3.8–5.2)
RBC MORPH BLD: ABNORMAL
SERVICE CMNT-IMP: ABNORMAL
WBC # BLD AUTO: 7.9 K/UL (ref 3.6–11)

## 2018-08-29 PROCEDURE — 85025 COMPLETE CBC W/AUTO DIFF WBC: CPT | Performed by: HOSPITALIST

## 2018-08-29 PROCEDURE — 82962 GLUCOSE BLOOD TEST: CPT

## 2018-08-29 PROCEDURE — 97161 PT EVAL LOW COMPLEX 20 MIN: CPT

## 2018-08-29 PROCEDURE — C9113 INJ PANTOPRAZOLE SODIUM, VIA: HCPCS | Performed by: INTERNAL MEDICINE

## 2018-08-29 PROCEDURE — 94760 N-INVAS EAR/PLS OXIMETRY 1: CPT

## 2018-08-29 PROCEDURE — 74011636637 HC RX REV CODE- 636/637: Performed by: INTERNAL MEDICINE

## 2018-08-29 PROCEDURE — 65660000000 HC RM CCU STEPDOWN

## 2018-08-29 PROCEDURE — 97530 THERAPEUTIC ACTIVITIES: CPT

## 2018-08-29 PROCEDURE — 97535 SELF CARE MNGMENT TRAINING: CPT

## 2018-08-29 PROCEDURE — 74011000250 HC RX REV CODE- 250: Performed by: INTERNAL MEDICINE

## 2018-08-29 PROCEDURE — 74011250637 HC RX REV CODE- 250/637: Performed by: HOSPITALIST

## 2018-08-29 PROCEDURE — 97165 OT EVAL LOW COMPLEX 30 MIN: CPT

## 2018-08-29 PROCEDURE — 36415 COLL VENOUS BLD VENIPUNCTURE: CPT | Performed by: HOSPITALIST

## 2018-08-29 PROCEDURE — 74011250636 HC RX REV CODE- 250/636: Performed by: INTERNAL MEDICINE

## 2018-08-29 PROCEDURE — 74011250637 HC RX REV CODE- 250/637: Performed by: INTERNAL MEDICINE

## 2018-08-29 RX ORDER — POTASSIUM CHLORIDE 750 MG/1
40 TABLET, FILM COATED, EXTENDED RELEASE ORAL 2 TIMES DAILY
Status: COMPLETED | OUTPATIENT
Start: 2018-08-29 | End: 2018-08-29

## 2018-08-29 RX ADMIN — LOSARTAN POTASSIUM 50 MG: 50 TABLET ORAL at 08:47

## 2018-08-29 RX ADMIN — POTASSIUM CHLORIDE 40 MEQ: 750 TABLET, EXTENDED RELEASE ORAL at 20:34

## 2018-08-29 RX ADMIN — Medication 10 ML: at 05:59

## 2018-08-29 RX ADMIN — POTASSIUM CHLORIDE 40 MEQ: 750 TABLET, EXTENDED RELEASE ORAL at 14:20

## 2018-08-29 RX ADMIN — INSULIN LISPRO 2 UNITS: 100 INJECTION, SOLUTION INTRAVENOUS; SUBCUTANEOUS at 07:17

## 2018-08-29 RX ADMIN — DORZOLAMIDE HCL 1 DROP: 20 SOLUTION/ DROPS OPHTHALMIC at 08:49

## 2018-08-29 RX ADMIN — FENTANYL CITRATE 25 MCG: 50 INJECTION, SOLUTION INTRAMUSCULAR; INTRAVENOUS at 07:17

## 2018-08-29 RX ADMIN — DORZOLAMIDE HCL 1 DROP: 20 SOLUTION/ DROPS OPHTHALMIC at 17:38

## 2018-08-29 RX ADMIN — Medication 10 ML: at 17:36

## 2018-08-29 RX ADMIN — FERROUS SULFATE TAB 325 MG (65 MG ELEMENTAL FE) 325 MG: 325 (65 FE) TAB at 07:17

## 2018-08-29 RX ADMIN — TIMOLOL MALEATE 1 DROP: 5 SOLUTION OPHTHALMIC at 08:48

## 2018-08-29 RX ADMIN — INSULIN LISPRO 5 UNITS: 100 INJECTION, SOLUTION INTRAVENOUS; SUBCUTANEOUS at 17:02

## 2018-08-29 RX ADMIN — SODIUM CHLORIDE 40 MG: 9 INJECTION INTRAMUSCULAR; INTRAVENOUS; SUBCUTANEOUS at 08:47

## 2018-08-29 RX ADMIN — AZELASTINE HYDROCHLORIDE 2 SPRAY: 137 SPRAY, METERED NASAL at 17:40

## 2018-08-29 RX ADMIN — AZELASTINE HYDROCHLORIDE 2 SPRAY: 137 SPRAY, METERED NASAL at 08:47

## 2018-08-29 RX ADMIN — FENTANYL CITRATE 25 MCG: 50 INJECTION, SOLUTION INTRAMUSCULAR; INTRAVENOUS at 17:36

## 2018-08-29 RX ADMIN — SODIUM CHLORIDE 125 ML/HR: 900 INJECTION, SOLUTION INTRAVENOUS at 05:59

## 2018-08-29 NOTE — PROGRESS NOTES
CM reviewed chart. Therapy is recommending skilled nursing for rehab. CM met with pt to discuss SNFs and offer freedom of choice. Pt stated that she usually goes to [facility] and prefers to go there again. CM sent referral through [facility] to [facility]. [facility] has confirmed that they will accept pt when she is medically ready for discharge. CM will continue to follow.  
SHELLY Preciado, ACM

## 2018-08-29 NOTE — PROGRESS NOTES
[facility] 
[address] [facility], [address] GI PROGRESS NOTE Alberto Metcalf, [address] office [phone] NP in-hospital cell phone M-F until 4:30 After 5pm or on weekends, please call  for physician on call NAME: Wilson Knight :  1940 MRN:  [id number] Subjective:  
Patient reports ongoing abdominal pain. She reports a normal, non-bloody bowel movement. Objective: VITALS:  
Last 24hrs VS reviewed since prior progress note. Most recent are: 
Visit Vitals  /70 (BP 1 Location: Right arm, BP Patient Position: At rest;Head of bed elevated (Comment degrees))  Pulse 65  Temp 98.6 °F (37 °C)  Resp 18  Ht 5' 3\" (1.6 m)  Wt 78.2 kg (172 lb 8 oz)  SpO2 99%  BMI 30.56 kg/m2 PHYSICAL EXAM: 
General: Cooperative, no acute distress   
Neurologic:  Alert and oriented X 3. HEENT: EOMI, no scleral icterus Lungs:  CTA bilaterally. No wheezing Heart:  S1 S2, regular rhythm, no murmur Abdomen: Soft, non-distended, generalized tenderness. +Bowel sounds Extremities: No edema Psych:   Questionable insight. Not anxious or agitated. Lab Data Reviewed:  
 
Recent Results (from the past 24 hour(s)) GLUCOSE, POC Collection Time: 18  3:56 PM  
Result Value Ref Range Glucose (POC) 107 (H) 65 - 100 mg/dL Performed by Allen Dempsey GLUCOSE, POC Collection Time: 18  9:43 PM  
Result Value Ref Range Glucose (POC) 229 (H) 65 - 100 mg/dL Performed by Vannessa Junior   
CBC WITH AUTOMATED DIFF Collection Time: 18  4:44 AM  
Result Value Ref Range WBC 7.9 3.6 - 11.0 K/uL  
 RBC 2.38 (L) 3.80 - 5.20 M/uL HGB 7.5 (L) 11.5 - 16.0 g/dL HCT 23.2 (L) 35.0 - 47.0 % MCV 97.5 80.0 - 99.0 FL  
 MCH 31.5 26.0 - 34.0 PG  
 MCHC 32.3 30.0 - 36.5 g/dL  
 RDW 16.2 (H) 11.5 - 14.5 % PLATELET 908 (L) 919 - 400 K/uL MPV 10.1 8.9 - 12.9 FL  
 NRBC 0.8 (H) 0  WBC ABSOLUTE NRBC 0.06 (H) 0.00 - 0.01 K/uL NEUTROPHILS 70 32 - 75 % LYMPHOCYTES 21 12 - 49 % MONOCYTES 7 5 - 13 % EOSINOPHILS 1 0 - 7 % BASOPHILS 0 0 - 1 % IMMATURE GRANULOCYTES 1 (H) 0.0 - 0.5 % ABS. NEUTROPHILS 5.4 1.8 - 8.0 K/UL  
 ABS. LYMPHOCYTES 1.7 0.8 - 3.5 K/UL  
 ABS. MONOCYTES 0.6 0.0 - 1.0 K/UL  
 ABS. EOSINOPHILS 0.1 0.0 - 0.4 K/UL  
 ABS. BASOPHILS 0.0 0.0 - 0.1 K/UL  
 ABS. IMM. GRANS. 0.1 (H) 0.00 - 0.04 K/UL  
 DF SMEAR SCANNED    
 PLATELET COMMENTS Large Platelets RBC COMMENTS MACROCYTOSIS 1+ 
    
 RBC COMMENTS ANISOCYTOSIS 1+ 
    
 RBC COMMENTS POLYCHROMASIA 1+ 
    
 RBC COMMENTS OVALOCYTES PRESENT 
    
GLUCOSE, POC Collection Time: 08/29/18  7:08 AM  
Result Value Ref Range Glucose (POC) 189 (H) 65 - 100 mg/dL Performed by Maryellen Schreiber   
GLUCOSE, POC Collection Time: 08/29/18 10:53 AM  
Result Value Ref Range Glucose (POC) 210 (H) 65 - 100 mg/dL Performed by Barron Luciano Assessment:  
· GI bleed: anemia with hemoccult positive stool. Hgb 7.5 Status post 2 units PRBCs; EGD 8/28 duodenal ulcer with prominent duodenal folds with marked narrowing,unable to pass scope, concern that pancreatic head lesion is infiltrating duodenum; biopsy non diagnostic, normal small bowel · Abdominal pain: WBC 7.9. CT abdomen/pelvis without contrast (8/27/18): mass lesion within the head of the pancreas with atrophy of the body and tail highly concerning for adenocarcinoma · Suspected metastatic pancreatic adenocarcinoma: CT abdomen/pelvis without contrast (8/27/18) · Elevated LFTs: improving AST: 169, ALT: 296, alkaline phosphatase: 224, total bilirubin: normal.  
 
Patient Active Problem List  
Diagnosis Code  Glaucoma H40.9  GERD (gastroesophageal reflux disease) K21.9  
 Uncontrolled type 2 diabetes mellitus with diabetic neuropathy, with long-term current use of insulin (HCC) E11.40, Z79.4, E11.65  
  Essential hypertension, benign I10  
 Hyperlipidemia E78.5  Anxiety F41.9  Osteoarthritis M19.90  Cardiac pacemaker in situ Z95.0  Diabetes ([facility] [address] E11.9  Acute upper GI bleed K92.2 Plan:  
· Continue PPI 
· Oncology following · Continue supportive measures Signed By: Laura Pineda NP   
 8/29/2018  1:24 PM 
  
 
 
 
 
 
 
Patient seen and examined, agree with plans, still with some abdominal pain. Duodenal biopsies are benign. I will talk with Dr. Trey Blanchard about possible EUS. She does not have a clue about what is going on, despite our talking with her.  
 
Brittney Cotto MD

## 2018-08-29 NOTE — ANESTHESIA POSTPROCEDURE EVALUATION
Post-Anesthesia Evaluation and Assessment Patient: Peggy Slade MRN: [id number]  SSN: [id number] YOB: 1940  Age: [de-identified] y.o. Sex: female Cardiovascular Function/Vital Signs Visit Vitals  /70 (BP 1 Location: Left arm, BP Patient Position: At rest)  Pulse 74  Temp 37 °C (98.6 °F)  Resp 20  
 Ht 5' 3\" (1.6 m)  Wt 78.3 kg (172 lb 11.2 oz)  SpO2 100%  BMI 30.59 kg/m2 Patient is status post MAC anesthesia for Procedure(s): ESOPHAGOGASTRODUODENOSCOPY (EGD) ESOPHAGOGASTRODUODENAL (EGD) BIOPSY. Nausea/Vomiting: None Postoperative hydration reviewed and adequate. Pain: 
Pain Scale 1: Numeric (0 - 10) (08/28/18 1932) Pain Intensity 1: 0 (08/28/18 1932) Managed Neurological Status:  
Neuro Neurologic State: Alert;Confused;Irritable (08/28/18 4527) Orientation Level: Oriented X4 (08/28/18 1933) Speech: Clear (08/28/18 4563) At baseline Mental Status and Level of Consciousness: Arousable Pulmonary Status:  
O2 Device: Room air (08/28/18 2008) Adequate oxygenation and airway patent Complications related to anesthesia: None Post-anesthesia assessment completed. No concerns Signed By: Abel Marquez MD   
 August 28, 2018

## 2018-08-29 NOTE — PROGRESS NOTES
Problem: Self Care Deficits Care Plan (Adult) Goal: *Acute Goals and Plan of Care (Insert Text) Occupational Therapy Goals Initiated 8/29/2018 1. Patient will perform bathing with supervision/set-up within 7 day(s). 2.  Patient will perform lower body dressing with supervision/set-up within 7 day(s). 3.  Patient will perform grooming in standing with supervision/set-up within 7 day(s). 4.  Patient will perform toilet transfers at [facility] level with supervision/set-up within 7 day(s). 5.  Patient will perform all aspects of toileting with supervision/set-up within 7 day(s). 6.  Patient will participate in upper extremity therapeutic exercise/activities with supervision/set-up for 10 minutes within 7 day(s). Occupational Therapy EVALUATION Patient: Nancy Maciel (64 y.o. female) Date: 8/29/2018 Primary Diagnosis: Gastrointestinal hemorrhage, unspecified gastrointestinal hemorrhage type [K92.2] Procedure(s) (LRB): ESOPHAGOGASTRODUODENOSCOPY (EGD) (N/A) ESOPHAGOGASTRODUODENAL (EGD) BIOPSY (N/A) 1 Day Post-Op Precautions:  Fall ASSESSMENT : 
Based on the objective data described below, the patient presents with impaired activity tolerance, mobility and balance necessary in ADL tasks. Nursing cleared for therapy. She lives in [facility] and reports overall indep with ADL tasks with use of RW or walls for mobility.  2x/wk to complete laundry and cleaning. She acknowledges multiple falls at home. Oriented x 4 however speech very tangential and needing verbal cues to redirect and additional commands to maximize participation. Questionable insight to CLOF and need for assistance and ability to complete medication mgmt. Toileting transfer training with HHA with min A to [facility] with 180 degree turn. Returned to bed. At this time she is requiring min A- mod A for all ADL tasks. Patient is not safe to dc to ILF alone.  Recommend SNF, she is open to SNF and reports good experience at [facility].  
 
 
Patient will benefit from skilled intervention to address the above impairments. Patients rehabilitation potential is considered to be Fair Factors which may influence rehabilitation potential include:  
[]             None noted []             Mental ability/status [x]             Medical condition []             Home/family situation and support systems []             Safety awareness []             Pain tolerance/management 
[]             Other: PLAN : 
Recommendations and Planned Interventions: 
[x]               Self Care Training                  [x]        Therapeutic Activities [x]               Functional Mobility Training    []        Cognitive Retraining 
[x]               Therapeutic Exercises           [x]        Endurance Activities [x]               Balance Training                   []        Neuromuscular Re-Education []               Visual/Perceptual Training     [x]   Home Safety Training 
[x]               Patient Education                 [x]        Family Training/Education []               Other (comment): Frequency/Duration: Patient will be followed by occupational therapy 3 times a week to address goals. Discharge Recommendations: Keith Villanueva Further Equipment Recommendations for Discharge: TBD SNF SUBJECTIVE:  
Patient stated I hold on to the walls and I fell.  OBJECTIVE DATA SUMMARY:  
HISTORY:  
Past Medical History:  
Diagnosis Date  Bradycardia  Diabetes ([address]  Hyperlipidemia  PUD (peptic ulcer disease) Past Surgical History:  
Procedure Laterality Date  CARDIAC SURG PROCEDURE UNLIST    
 pacemaker, bipass  COLONOSCOPY  2011  HX BUNIONECTOMY    
 right foot 11/24/07  HX CATARACT REMOVAL  07/2016 Prior Level of Function/Environment/Context: [place]. Reports mod indep with ADL tasks with RW or use of walls to support set. Multiple falls. Occupations in which the patient is/was successful, what are the barriers preventing that success:  
Performance Patterns (routines, roles, habits, and rituals):  
Personal Interests and/or values:  
Expanded or extensive additional review of patient history:  
 
Home Situation Home Environment: Independent living ([address]) One/Two Story Residence: One story Living Alone: Yes Support Systems:  (house keeper) Patient Expects to be Discharged to[de-identified] [facility] Current DME Used/Available at Home: Walker, rolling Tub or Shower Type:  (sponge  bath) Hand dominance: Right EXAMINATION OF PERFORMANCE DEFICITS: 
Cognitive/Behavioral Status: 
Neurologic State: Alert Orientation Level: Oriented to person;Oriented to place;Oriented to situation;Oriented to time Hearing: Auditory Auditory Impairment: None Vision/Perceptual:   
    
    
    
  
    
Acuity: Able to read employee name badge without difficulty; Able to read clock/calendar on wall without difficulty (reports \"stroke in R eye\") Corrective Lenses: Glasses Range of Motion: 
AROM: Generally decreased, functional 
  
 R shoulder- atypical glenohumeral rotation Strength: 
Strength: Generally decreased, functional 
  
  
 
Coordination: 
  
Fine Motor Skills-Upper: Left Intact; Right Intact Gross Motor Skills-Upper: Left Intact; Right Intact Tone & Sensation: BUE WDL Balance: 
Sitting: Intact Standing: Impaired Functional Mobility and Transfers for ADLs: 
Bed Mobility: 
Supine to Sit: Stand-by assistance Sit to Supine: Stand-by assistance Transfers: 
Sit to Stand: Minimum assistance Stand to Sit: Minimum assistance Bed to Chair: Minimum assistance Toilet Transfer : Minimum assistance (HHA) ADL Assessment: 
Feeding: Independent Oral Facial Hygiene/Grooming: Supervision (seated) Bathing: Moderate assistance Upper Body Dressing: Supervision Lower Body Dressing: Moderate assistance Toileting: Minimum assistance ADL Intervention and task modifications: 
 Supine > sit with CGA with additional time. Good sitting balance. BUE with mild impairment with end ROM for shoulder overhead flexion. Education on toileting transfer- completed with min A with HHA for brief amb to [facility] and returned to bed. Impaired safety awareness, needing verbal cues through out session. Ed on not getting OOB on her own, calling for nursing. Functional Measure: 
Barthel Index: 
 
Bathin Bladder: 5 Bowels: 5 Groomin Dressin Feeding: 10 Mobility: 0 Stairs: 0 Toilet Use: 5 Transfer (Bed to Chair and Back): 10 Total: 40 Barthel and G-code impairment scale: 
Percentage of impairment CH 
0% CI 
1-19% CJ 
20-39% CK 
40-59% CL 
60-79% CM 
80-99% CN 
100% Barthel Score 0-100 100 99-80 79-60 59-40 20-39 1-19 
 0 Barthel Score 0-20 20 17-19 13-16 9-12 5-8 1-4 0 The Barthel ADL Index: Guidelines 1. The index should be used as a record of what a patient does, not as a record of what a patient could do. 2. The main aim is to establish degree of independence from any help, physical or verbal, however minor and for whatever reason. 3. The need for supervision renders the patient not independent. 4. A patient's performance should be established using the best available evidence. Asking the patient, friends/relatives and nurses are the usual sources, but direct observation and common sense are also important. However direct testing is not needed. 5. Usually the patient's performance over the preceding 24-48 hours is important, but occasionally longer periods will be relevant. 6. Middle categories imply that the patient supplies over 50 per cent of the effort. 7. Use of aids to be independent is allowed. Darryle Chant., Barthel, D.W. (9318). Functional evaluation: the Barthel Index. [address] [facility] (14)2.  
MABEL Marquez, James Barker., Tila Armendariz., Jerry Burgess. (1999). Measuring the change indisability after inpatient rehabilitation; comparison of the responsiveness of the Barthel Index and Functional Richardson Measure. Journal of Neurology, Neurosurgery, and Psychiatry, 66(4), 105-242. YANDY Salinas, LAURIE Contreras, & Dennis Chou M.A. (2004.) Assessment of post-stroke quality of life in cost-effectiveness studies: The usefulness of the Barthel Index and the EuroQoL-5D. [facility], 13, 629-19 G codes: In compliance with CMSs Claims Based Outcome Reporting, the following G-code set was chosen for this patient based on their primary functional limitation being treated: The outcome measure chosen to determine the severity of the functional limitation was the Bartehl index with a score of 40/100 which was correlated with the impairment scale. ? Self Care:  
  - CURRENT STATUS: CK - 40%-59% impaired, limited or restricted  - GOAL STATUS: CJ - 20%-39% impaired, limited or restricted  - D/C STATUS:  ---------------To be determined--------------- Occupational Therapy Evaluation Charge Determination History Examination Decision-Making LOW Complexity : Brief history review  LOW Complexity : 1-3 performance deficits relating to physical, cognitive , or psychosocial skils that result in activity limitations and / or participation restrictions  LOW Complexity : No comorbidities that affect functional and no verbal or physical assistance needed to complete eval tasks Based on the above components, the patient evaluation is determined to be of the following complexity level: LOW Pain: 
Pain Scale 1: Numeric (0 - 10) Pain Intensity 1: 6 Pain Location 1: Abdomen Pain Orientation 1: Mid;Lower Pain Description 1: Aching Pain Intervention(s) 1: Medication (see MAR) Activity Tolerance:  
Fair for basic ADL tasks. After treatment:  
[x] Patient left in no apparent distress sitting up in chair [] Patient left in no apparent distress in bed 
[x] Call bell left within reach [x] Nursing notified 
[] Caregiver present 
[] Bed alarm activated COMMUNICATION/EDUCATION:  
The patients plan of care was discussed with: Physical Therapist, Registered Nurse and Case mgmt. [x] Home safety education was provided and the patient/caregiver indicated understanding. [x] Patient/family have participated as able in goal setting and plan of care. [x] Patient/family agree to work toward stated goals and plan of care. [] Patient understands intent and goals of therapy, but is neutral about his/her participation. [] Patient is unable to participate in goal setting and plan of care. This patients plan of care is appropriate for delegation to [facility]. Thank you for this referral. 
Jordy Martinez OT Time Calculation: 17 mins

## 2018-08-29 NOTE — PROGRESS NOTES
Problem: Mobility Impaired (Adult and Pediatric) Goal: *Acute Goals and Plan of Care (Insert Text) Physical Therapy Goals Initiated 8/29/2018 1. Patient will move from supine to sit and sit to supine  in bed with independence within 7 day(s). 2.  Patient will transfer from bed to chair and chair to bed with modified independence using the least restrictive device within 7 day(s). 3.  Patient will perform sit to stand with modified independence within 7 day(s). 4.  Patient will ambulate with minimal assistance/contact guard assist for 50 feet with the least restrictive device within 7 day(s). physical Therapy EVALUATION Patient: Barbara Meza (47 y.o. female) Date: 8/29/2018 Primary Diagnosis: Gastrointestinal hemorrhage, unspecified gastrointestinal hemorrhage type [K92.2] Procedure(s) (LRB): ESOPHAGOGASTRODUODENOSCOPY (EGD) (N/A) ESOPHAGOGASTRODUODENAL (EGD) BIOPSY (N/A) 1 Day Post-Op Precautions:   Fall ASSESSMENT : 
Based on the objective data described below, the patient presents with decreased functional mobility, impaired balance and gait, decreased tolerance to activity s/p admission for abdominal pain and found to have acute GI bleed. Pt received supine in bed and agreeable to therapy. Pt oriented x 4, but very tangential and perseverating on her fall. Pt reported living alone at [address] [place] [address], ambulates with a RW. Pt reported at the time of her fall she was not using her RW. Pt has home health aides Tue-Sat 10:30-4:30. Patient tolerated evaluation fairly well. Pt completed supine to sit with standby assist with HOB elevated, sit<>stand with min assist x 2 trials from the bed and from [facility]. Patient with generalized instability and slight buckling of bilateral LEs. Patient assisted back to supine position with bed alarm set.  Pt will continue to benefit from PT to progress mobility and reach highest level of independence. Pt will benefit from SNF upon discharge. . 
 
Patient will benefit from skilled intervention to address the above impairments. Patients rehabilitation potential is considered to be Good Factors which may influence rehabilitation potential include:  
[]         None noted 
[]         Mental ability/status [x]         Medical condition 
[]         Home/family situation and support systems 
[]         Safety awareness 
[]         Pain tolerance/management 
[]         Other: PLAN : 
Recommendations and Planned Interventions: 
[x]           Bed Mobility Training             [x]    Neuromuscular Re-Education 
[x]           Transfer Training                   []    Orthotic/Prosthetic Training 
[x]           Gait Training                         []    Modalities [x]           Therapeutic Exercises           []    Edema Management/Control 
[x]           Therapeutic Activities            [x]    Patient and Family Training/Education 
[]           Other (comment): Frequency/Duration: Patient will be followed by physical therapy  3 times a week to address goals. Discharge Recommendations: Keith Villanueva Further Equipment Recommendations for Discharge: tbd SUBJECTIVE:  
Patient stated I fell and my left side hurts.  OBJECTIVE DATA SUMMARY:  
HISTORY:   
Past Medical History:  
Diagnosis Date  Bradycardia  Diabetes ([address]  Hyperlipidemia  PUD (peptic ulcer disease) Past Surgical History:  
Procedure Laterality Date  CARDIAC SURG PROCEDURE UNLIST    
 pacemaker, bipass  COLONOSCOPY  2011  HX BUNIONECTOMY    
 right foot 11/24/07  HX CATARACT REMOVAL  07/2016 Prior Level of Function/Home Situation: see above Personal factors and/or comorbidities impacting plan of care:  
 
Home Situation Home Environment: Independent living ([address]) One/Two Story Residence: One story Living Alone: Yes Support Systems:  (house keeper) Patient Expects to be Discharged to[de-identified] [id number] Current DME Used/Available at Home: Walker, rolling Tub or Shower Type:  (sponge  bath) EXAMINATION/PRESENTATION/DECISION MAKING:  
Critical Behavior: 
Neurologic State: Alert Orientation Level: Oriented to person, Oriented to place, Oriented to situation, Oriented to time Hearing: Auditory Auditory Impairment: None Skin:   
Edema:  
Range Of Motion: 
AROM: Generally decreased, functional 
  
  
  
  
  
  
  
Strength:   
Strength: Generally decreased, functional 
  
  
  
  
  
  
Tone & Sensation:  
  
  
  
  
  
  
  
  
  
   
Coordination: 
  
Vision:  
Acuity: Able to read employee name badge without difficulty; Able to read clock/calendar on wall without difficulty (reports \"stroke in R eye\") Corrective Lenses: Glasses Functional Mobility: 
Bed Mobility: 
  
Supine to Sit: Stand-by assistance Sit to Supine: Stand-by assistance Transfers: 
Sit to Stand: Minimum assistance Stand to Sit: Minimum assistance Bed to Chair: Minimum assistance Balance:  
Sitting: Intact Standing: Impaired Ambulation/Gait Training: 
Distance (ft): 3 Feet (ft) Assistive Device: Gait belt Ambulation - Level of Assistance: Minimal assistance Gait Abnormalities: Decreased step clearance;Shuffling gait; Path deviations Base of Support: Widened Speed/Ailyn: Shuffled;Pace decreased (<100 feet/min) Step Length: Right shortened;Left shortened Functional Measure: 
Tinetti test: 
 
Sitting Balance: 1 Arises: 1 Attempts to Rise: 1 Immediate Standing Balance: 0 Standing Balance: 0 Nudged: 1 Eyes Closed: 0 Turn 360 Degrees - Continuous/Discontinuous: 0 Turn 360 Degrees - Steady/Unsteady: 0 Sitting Down: 1 Balance Score: 5 Indication of Gait: 1 
R Step Length/Height: 1 L Step Length/Height: 1 
R Foot Clearance: 1 L Foot Clearance: 1 Step Symmetry: 1 Step Continuity: 1 Path: 1 Trunk: 0 Walking Time: 0 Gait Score: 8 Total Score: 13 Tinetti Test and G-code impairment scale: 
Percentage of Impairment CH 
 
0% 
 CI 
 
1-19% CJ 
 
20-39% CK 
 
40-59% CL 
 
60-79% CM 
 
80-99% CN  
 
100% Tinetti Score 0-28 28 23-27 17-22 12-16 6-11 1-5 0 Tinetti Tool Score Risk of Falls 
<19 = High Fall Risk 19-24 = Moderate Fall Risk 25-28 = Low Fall Risk Tinetti ME. Performance-Oriented Assessment of Mobility Problems in Elderly Patients. [facility]; [id number]. (Scoring Description: PT Bulletin Feb. 10, 1993) Older adults: Verner Nevin et al, 2009; n = [address] elderly evaluated with ABC, WOOD, ADL, and IADL) · Mean WOOD score for males aged 69-68 years = 26.21(3.40) · Mean WOOD score for females age 69-68 years = 25.16(4.30) · Mean WOOD score for males over 80 years = 23.29(6.02) · Mean WOOD score for females over 80 years = 17.20(8.32) G codes: In compliance with CMSs Claims Based Outcome Reporting, the following G-code set was chosen for this patient based on their primary functional limitation being treated: The outcome measure chosen to determine the severity of the functional limitation was the Tinetti with a score of 13/28 which was correlated with the impairment scale. ? Mobility - Walking and Moving Around:  
  - CURRENT STATUS: CK - 40%-59% impaired, limited or restricted  - GOAL STATUS: CJ - 20%-39% impaired, limited or restricted  - D/C STATUS:  ---------------To be determined--------------- Based on the above components, the patient evaluation is determined to be of the following complexity level: LOW Pain: 
Pain Scale 1: Numeric (0 - 10) Pain Intensity 1: 6 Pain Location 1: Abdomen Pain Orientation 1: Mid;Lower Pain Description 1: Aching Pain Intervention(s) 1: Medication (see MAR) Activity Tolerance:  
Fair. VSS Please refer to the flowsheet for vital signs taken during this treatment. After treatment: []         Patient left in no apparent distress sitting up in chair 
[x]         Patient left in no apparent distress in bed 
[x]         Call bell left within reach [x]         Nursing notified 
[]         Caregiver present [x]         Bed alarm activated COMMUNICATION/EDUCATION:  
The patients plan of care was discussed with: Occupational Therapist and Registered Nurse. [x]         Fall prevention education was provided and the patient/caregiver indicated understanding. [x]         Patient/family have participated as able in goal setting and plan of care. [x]         Patient/family agree to work toward stated goals and plan of care. []         Patient understands intent and goals of therapy, but is neutral about his/her participation. []         Patient is unable to participate in goal setting and plan of care. Thank you for this referral. 
Adarsh Ricardo, PT , DPT Time Calculation: 17 mins

## 2018-08-29 NOTE — PROGRESS NOTES
Spiritual Care Partner Volunteer visited patient in 2N on 8/29/18. Documented by: 
Augustine Velásquez M.Div.  Paging Service [phone])

## 2018-08-29 NOTE — PROGRESS NOTES
Bedside and Verbal shift change report given to Jose Cruz RN (oncoming nurse) by Allie Griffin RN (offgoing nurse). Report included the following information SBAR, Kardex and Recent Results.

## 2018-08-29 NOTE — ROUTINE PROCESS
Bedside shift change report given to rashaun salter rn (oncoming nurse) by vince rn (offgoing nurse). Report included the following information SBAR and Kardex.

## 2018-08-29 NOTE — PROGRESS NOTES
Hematology-Oncology Progress Note Leena Blanco 1940 
[id number] 
8/29/2018 Follow-up for: pancreatic carcinoma? [x]        Chart notes since last visit reviewed [x]        Medications reviewed for allergies and interactions Case discussed with the following:         []            
               []        Nursing Staff  
                                                                      []        Pathologist 
                                                                      []        FAMILY Subjective:  
 
Spoke with patient who complains of: pt. Is comfortable today, s/p egd and bx of duodenal lesion yesterday, no c/o bleeding Objective:  
Patient Vitals for the past 24 hrs: 
 BP Temp Pulse Resp SpO2 Weight 08/29/18 0822 146/70 98.6 °F (37 °C) 65 18 99 % -  
08/29/18 0400 143/75 98.2 °F (36.8 °C) 65 18 100 % 78.2 kg (172 lb 8 oz) 08/29/18 0100 - - - - 100 % -  
08/28/18 2008 123/70 98.6 °F (37 °C) 74 20 100 % -  
08/28/18 1529 118/70 - 65 22 100 % -  
08/28/18 1524 113/68 - 76 20 100 % -  
08/28/18 1519 126/61 - 79 22 100 % -  
08/28/18 1514 (!) 58/29 - 65 14 100 % -  
08/28/18 1511 126/61 98 °F (36.7 °C) 69 17 98 % -  
08/28/18 1503 (!) 127/37 - 65 22 100 % -  
08/28/18 1432 (!) 154/102 - 72 16 100 % -  
 
 
REVIEW OF SYSTEMS:   
Constitutional: negative fever, negative chills, negative weight loss Eyes:   negative visual changes ENT:   negative sore throat, tongue or lip swelling Respiratory:  negative cough, negative dyspnea Cards:  negative for chest pain, palpitations, lower extremity edema GI:   negative for nausea, vomiting, diarrhea, and abdominal pain Neuro:  negative for headaches, dizziness, vertigo [x]                        Full ROS o/w normal/non contributor Constitutional:  Patient looks []        Sick []        Frail 
[x]        Better                                                
[]        Depressed HEENT:  [x]   NC                         []   AT               []    ALOPECIA Eyes: [x]   Normal               []    Icteric Oropharynx: [x]    Normal                  []  Thrush               []   Dry Mucositis: [x]    None                 Grade: []        I  []        II  []        III  []        IV Neck:   [x]   Supple                  []  Rigid JVD:    [x]   ABSENT       []   PRESENT Lymphadenopathy:   [x]   None Noted            []   PRESENT Chest: 
[x]   Clear               []    Rhonchi                      Dec'd @     []  Right Base           []   Left Base CV:             [x]   Regular              []  Irregular               []   Tachy                []   Murmur Abdominal:   [x]    Soft              [x]   NON-tender               []   Tender BS:    []   ABSENT                   [x]   PRESENT Liver:     [x]  NON-palp                  []   EDGE- palp Spleen: [x]   NON-palp                   []  EDGE - palp Mass:   [x]   ABSENT                          []  PRESENT Extr:    [x]  Lymphedema             []   Cyanosis      []  Clubbing Edema:     [x]   NONE       []   PRESENT Skin:  Intact [x]           Purpura [] Rash: [x]   ABSENT       []  PRESENT Neuro: 
[x]        Normal 
[]        Confused Available labs reviewed: 
Labs:   
Recent Results (from the past 24 hour(s)) GLUCOSE, POC Collection Time: 08/28/18 11:40 AM  
Result Value Ref Range Glucose (POC) 156 (H) 65 - 100 mg/dL Performed by Lyndon Lyman GLUCOSE, POC Collection Time: 08/28/18  3:56 PM  
Result Value Ref Range Glucose (POC) 107 (H) 65 - 100 mg/dL Performed by Lyndonmir Lyman GLUCOSE, POC Collection Time: 08/28/18  9:43 PM  
Result Value Ref Range Glucose (POC) 229 (H) 65 - 100 mg/dL Performed by Kolton Kilgore   
CBC WITH AUTOMATED DIFF Collection Time: 08/29/18  4:44 AM  
Result Value Ref Range WBC 7.9 3.6 - 11.0 K/uL RBC 2.38 (L) 3.80 - 5.20 M/uL HGB 7.5 (L) 11.5 - 16.0 g/dL HCT 23.2 (L) 35.0 - 47.0 % MCV 97.5 80.0 - 99.0 FL  
 MCH 31.5 26.0 - 34.0 PG  
 MCHC 32.3 30.0 - 36.5 g/dL  
 RDW 16.2 (H) 11.5 - 14.5 % PLATELET 319 (L) 011 - 400 K/uL MPV 10.1 8.9 - 12.9 FL  
 NRBC 0.8 (H) 0  WBC ABSOLUTE NRBC 0.06 (H) 0.00 - 0.01 K/uL NEUTROPHILS 70 32 - 75 % LYMPHOCYTES 21 12 - 49 % MONOCYTES 7 5 - 13 % EOSINOPHILS 1 0 - 7 % BASOPHILS 0 0 - 1 % IMMATURE GRANULOCYTES 1 (H) 0.0 - 0.5 % ABS. NEUTROPHILS 5.4 1.8 - 8.0 K/UL  
 ABS. LYMPHOCYTES 1.7 0.8 - 3.5 K/UL  
 ABS. MONOCYTES 0.6 0.0 - 1.0 K/UL  
 ABS. EOSINOPHILS 0.1 0.0 - 0.4 K/UL  
 ABS. BASOPHILS 0.0 0.0 - 0.1 K/UL  
 ABS. IMM. GRANS. 0.1 (H) 0.00 - 0.04 K/UL  
 DF SMEAR SCANNED    
 PLATELET COMMENTS Large Platelets RBC COMMENTS MACROCYTOSIS 1+ 
    
 RBC COMMENTS ANISOCYTOSIS 1+ 
    
 RBC COMMENTS POLYCHROMASIA 1+ 
    
 RBC COMMENTS OVALOCYTES PRESENT 
    
GLUCOSE, POC Collection Time: 08/29/18  7:08 AM  
Result Value Ref Range Glucose (POC) 189 (H) 65 - 100 mg/dL Performed by Ivonne Bates   
GLUCOSE, POC Collection Time: 08/29/18 10:53 AM  
Result Value Ref Range Glucose (POC) 210 (H) 65 - 100 mg/dL Performed by Vinh Pichardo Available Xrays reviewed: 
 
Chemotherapy monitored and toxicities assessed: 
 
Assessment and Plan 1. ?pancreatic carcinoma. .. She is s/p egd and biopsy yesterday, the path is pending,   She is not sure if she wants to get treated , her fund of knowledge is compromised and makes treatment discussions challenging, I would like to talk with she and her daughter after the final path is back. 2. Anemia. . Secondary to presumed gi bleeding, hgb is 7.5 after transfusion. Will continue iron and check cbc in am 
3. Deconditioning. .. Physical therapy to see 4.  Andrew Kilpatrick MD

## 2018-08-29 NOTE — PROGRESS NOTES
Hospitalist Progress Note Magi Giron MD 
Answering service: [phone] OR [phone] f  
  
Date of Service:  2018 NAME:  Mary Cuevas :  1940 MRN:  [id number] Admission Summary: This is an 78-year-old woman with a past medical history significant for type 2 diabetes, dyslipidemia, coronary artery disease status post CABG, status post pacemaker insertion, was in her usual state of health until  of this year when the patient developed abdominal pain. The abdominal pain is located at the lower abdomen, is constant with radiation to the back, 10/10 in severity. Patient described the abdominal pain as cramping. No known aggravating or relieving factors, was seen by primary care physician and was also seen by the gastroenterologist.  
 
Interval history / Subjective: S/P egd with biopsies, after 2 unit BT hgb 7.5 Assessment & Plan:  
 
Acute blood loss anemia (POA) - s/p 2 unit BT hgb 7.5 
- likely due to metastatic pancreatic CA exacerbated by GI blood loss  
- iron, B12, folate WNL 
  
GI bleed (POA) - FOBT + 
- IV PPI BID 
- pt denies hematochezia/melena - GI consulted egd done  
- hold ASA and apixaban 
- EUS?  
  
Prerenal azotemia (POA) - likely due to GI bleed 
- continue IVF and BT 
- hold home HCTZ 
  
DM2 - last A1c 7.8 (2018); SSI 
- hold home metformin, insulin detemir, linaclotide, pioglitazone, glipizide 
- resume meds as appropriate 
  
Elevated LFTs - check US 
  
Metastatic pancreatic CA - Oncology consulted 
  
Multiple b/l lung nodules (POA) - seen on CXR 
- CTA - Multiple pulmonary nodules, likely metastases. 
  
SSS s/p PPM - hold apixaban and ASA for now 
  
DLD - hold home statin due to transaminitis 
  
HTN - controlled on home meds; hold home HCTZ while on IVF 
  
CAD s/p CABG - hold ASA (due to GIB) and statin (due to transaminitis) 
- resumed home ARB 
- no BB on PTA med list 
 
 Mild hypokalemia Abnormal LFT's -? Liver mets vs low BP with GIB 
  
Code status: Full possibly advance stage 4 metastatic pancreatic ca need detailed discussion by oncology to define GOC once diagnosis is established DVT prophylaxis:SCD Poor prognosis Care Plan discussed with: Patient/Family and Nurse Disposition: TBD Hospital Problems  Date Reviewed: 8/27/2018 Codes Class Noted POA * (Principal)Acute upper GI bleed ICD-10-CM: K92.2 ICD-9-CM: 578.9  8/27/2018 Yes Review of Systems: A comprehensive review of systems was negative. Vital Signs:  
 Last 24hrs VS reviewed since prior progress note. Most recent are: 
Visit Vitals  /70 (BP 1 Location: Right arm, BP Patient Position: At rest;Head of bed elevated (Comment degrees))  Pulse 65  Temp 98.6 °F (37 °C)  Resp 18  Ht 5' 3\" (1.6 m)  Wt 78.2 kg (172 lb 8 oz)  SpO2 99%  BMI 30.56 kg/m2 Intake/Output Summary (Last 24 hours) at 08/29/18 1054 Last data filed at 08/29/18 5299 Gross per 24 hour Intake              350 ml Output                0 ml Net              350 ml Physical Examination:  
 
 
     
Constitutional:  No acute distress, ENT:  Oral mucous moist, Resp:  CTA bilaterally. CV:  Regular rhythm, normal rate GI:  Soft, non distended, non tender. bs+ Musculoskeletal:  No edema, warm, 2+ pulses throughout Neurologic:  Moves all extremities. AAOx1, CN II-XII reviewed Data Review:  
 I personally reviewed  Image and labs Labs:  
 
Recent Labs  
   08/29/18 
 0444  08/28/18 
 0448 WBC  7.9  9.0 HGB  7.5*  7.7* HCT  23.2*  23.1*  
PLT  138*  137* Recent Labs  
   08/28/18 
 0448  08/27/18 
 6482 NA  141  138  
K  3.4*  3.7 CL  106  102 CO2  23  24 BUN  30*  50* CREA  0.70  1.00 GLU  153*  217* CA  8.9  8.9 MG   --   1.6 PHOS   --   2.1* Recent Labs  
   08/28/18 
 0448  08/27/18 
 3960 SGOT  169*  423* ALT  296*  444* AP  224*  291* TBILI  0.7  0.7 TP  6.0*  6.3* ALB  3.1*  3.4*  
GLOB  2.9  2.9 AML   --   25  
LPSE   --   47*  51* No results for input(s): INR, PTP, APTT in the last 72 hours. No lab exists for component: INREXT, INREXT Recent Labs  
   08/27/18 
 9930 TIBC  278 PSAT  38 FERR  120 Lab Results Component Value Date/Time Folate 25.3 (H) 08/27/2018 03:42 AM  
  
No results for input(s): PH, PCO2, PO2 in the last 72 hours. Recent Labs  
   08/27/18 
 4445 CPK  112 CKNDX  2.1 TROIQ  <0.05 Lab Results Component Value Date/Time Cholesterol, total 82 08/28/2018 04:48 AM  
 HDL Cholesterol 38 08/28/2018 04:48 AM  
 LDL, calculated 27.6 08/28/2018 04:48 AM  
 Triglyceride 82 08/28/2018 04:48 AM  
 CHOL/HDL Ratio 2.2 08/28/2018 04:48 AM  
 
Lab Results Component Value Date/Time Glucose (POC) 189 (H) 08/29/2018 07:08 AM  
 Glucose (POC) 229 (H) 08/28/2018 09:43 PM  
 Glucose (POC) 107 (H) 08/28/2018 03:56 PM  
 Glucose (POC) 156 (H) 08/28/2018 11:40 AM  
 Glucose (POC) 178 (H) 08/28/2018 06:29 AM  
 
Lab Results Component Value Date/Time Color YELLOW/STRAW 08/27/2018 04:37 AM  
 Appearance CLEAR 08/27/2018 04:37 AM  
 Specific gravity 1.019 08/27/2018 04:37 AM  
 pH (UA) 5.0 08/27/2018 04:37 AM  
 Protein NEGATIVE  08/27/2018 04:37 AM  
 Glucose NEGATIVE  08/27/2018 04:37 AM  
 Ketone TRACE (A) 08/27/2018 04:37 AM  
 Bilirubin NEGATIVE  08/27/2018 04:37 AM  
 Urobilinogen 0.2 08/27/2018 04:37 AM  
 Nitrites NEGATIVE  08/27/2018 04:37 AM  
 Leukocyte Esterase NEGATIVE  08/27/2018 04:37 AM  
 Epithelial cells FEW 08/27/2018 04:37 AM  
 Bacteria NEGATIVE  08/27/2018 04:37 AM  
 WBC 0-4 08/27/2018 04:37 AM  
 RBC 0-5 08/27/2018 04:37 AM  
 
 
 
Medications Reviewed:  
 
Current Facility-Administered Medications Medication Dose Route Frequency  0.9% sodium chloride infusion 250 mL  250 mL IntraVENous PRN  
  0.9% sodium chloride infusion  125 mL/hr IntraVENous CONTINUOUS  
 pantoprazole (PROTONIX) 40 mg in sodium chloride 0.9% 10 mL injection  40 mg IntraVENous Q12H  
 azelastine (ASTELIN) 137mcg/spray nasal spray  2 Spray Both Nostrils BID  dorzolamide (TRUSOPT) 2 % ophthalmic solution 1 Drop  1 Drop Both Eyes TID  diazePAM (VALIUM) tablet 2 mg  2 mg Oral QHS PRN  
 ferrous sulfate tablet 325 mg  1 Tab Oral ACB  latanoprost (XALATAN) 0.005 % ophthalmic solution 1 Drop  1 Drop Both Eyes QHS  losartan (COZAAR) tablet 50 mg  50 mg Oral DAILY  timolol (TIMOPTIC) 0.5 % ophthalmic solution 1 Drop  1 Drop Both Eyes DAILY  sodium chloride (NS) flush 5-10 mL  5-10 mL IntraVENous Q8H  
 sodium chloride (NS) flush 5-10 mL  5-10 mL IntraVENous PRN  
 fentaNYL citrate (PF) injection 25 mcg  25 mcg IntraVENous Q4H PRN  
 ondansetron (ZOFRAN) injection 4 mg  4 mg IntraVENous Q4H PRN  
 insulin lispro (HUMALOG) injection   SubCUTAneous AC&HS  
 glucose chewable tablet 16 g  4 Tab Oral PRN  
 dextrose (D50W) injection syrg 12.5-25 g  12.5-25 g IntraVENous PRN  
 glucagon (GLUCAGEN) injection 1 mg  1 mg IntraMUSCular PRN  
 
______________________________________________________________________ EXPECTED LENGTH OF STAY: 3d 2h 
ACTUAL LENGTH OF STAY:          2 Estela Corona MD

## 2018-08-29 NOTE — PROGRESS NOTES
Bedside shift change report given to Alexandre Andrew (oncoming nurse) by Martha Rodriguez RN (offgoing nurse). Report included the following information SBAR, MAR and Recent Results.

## 2018-08-29 NOTE — PROGRESS NOTES
Problem: Falls - Risk of 
Goal: *Absence of Falls Document Savita Bunting Fall Risk and appropriate interventions in the flowsheet. Outcome: Progressing Towards Goal 
Fall Risk Interventions: 
Mobility Interventions: Patient to call before getting OOB Mentation Interventions: Adequate sleep, hydration, pain control, Door open when patient unattended, More frequent rounding Medication Interventions: Evaluate medications/consider consulting pharmacy History of Falls Interventions: Door open when patient unattended, Bed/chair exit alarm

## 2018-08-30 ENCOUNTER — ANESTHESIA EVENT (OUTPATIENT)
Dept: ENDOSCOPY | Age: 78
DRG: 435 | End: 2018-08-30
Payer: MEDICARE

## 2018-08-30 LAB
ERYTHROCYTE [DISTWIDTH] IN BLOOD BY AUTOMATED COUNT: 16.3 % (ref 11.5–14.5)
GLUCOSE BLD STRIP.AUTO-MCNC: 147 MG/DL (ref 65–100)
GLUCOSE BLD STRIP.AUTO-MCNC: 168 MG/DL (ref 65–100)
GLUCOSE BLD STRIP.AUTO-MCNC: 192 MG/DL (ref 65–100)
GLUCOSE BLD STRIP.AUTO-MCNC: 271 MG/DL (ref 65–100)
HCT VFR BLD AUTO: 21.5 % (ref 35–47)
HGB BLD-MCNC: 6.9 G/DL (ref 11.5–16)
MCH RBC QN AUTO: 31.8 PG (ref 26–34)
MCHC RBC AUTO-ENTMCNC: 32.1 G/DL (ref 30–36.5)
MCV RBC AUTO: 99.1 FL (ref 80–99)
NRBC # BLD: 0.04 K/UL (ref 0–0.01)
NRBC BLD-RTO: 0.6 PER 100 WBC
PLATELET # BLD AUTO: 126 K/UL (ref 150–400)
PMV BLD AUTO: 10.5 FL (ref 8.9–12.9)
RBC # BLD AUTO: 2.17 M/UL (ref 3.8–5.2)
SERVICE CMNT-IMP: ABNORMAL
WBC # BLD AUTO: 7 K/UL (ref 3.6–11)

## 2018-08-30 PROCEDURE — P9016 RBC LEUKOCYTES REDUCED: HCPCS | Performed by: STUDENT IN AN ORGANIZED HEALTH CARE EDUCATION/TRAINING PROGRAM

## 2018-08-30 PROCEDURE — 82962 GLUCOSE BLOOD TEST: CPT

## 2018-08-30 PROCEDURE — 74011250637 HC RX REV CODE- 250/637: Performed by: INTERNAL MEDICINE

## 2018-08-30 PROCEDURE — 36415 COLL VENOUS BLD VENIPUNCTURE: CPT | Performed by: INTERNAL MEDICINE

## 2018-08-30 PROCEDURE — C9113 INJ PANTOPRAZOLE SODIUM, VIA: HCPCS | Performed by: INTERNAL MEDICINE

## 2018-08-30 PROCEDURE — 85027 COMPLETE CBC AUTOMATED: CPT | Performed by: INTERNAL MEDICINE

## 2018-08-30 PROCEDURE — 65660000000 HC RM CCU STEPDOWN

## 2018-08-30 PROCEDURE — 74011636637 HC RX REV CODE- 636/637: Performed by: INTERNAL MEDICINE

## 2018-08-30 PROCEDURE — 36430 TRANSFUSION BLD/BLD COMPNT: CPT

## 2018-08-30 PROCEDURE — 74011250636 HC RX REV CODE- 250/636: Performed by: INTERNAL MEDICINE

## 2018-08-30 PROCEDURE — 74011000250 HC RX REV CODE- 250: Performed by: INTERNAL MEDICINE

## 2018-08-30 PROCEDURE — 94760 N-INVAS EAR/PLS OXIMETRY 1: CPT

## 2018-08-30 RX ORDER — ACETAMINOPHEN 325 MG/1
650 TABLET ORAL
Status: DISCONTINUED | OUTPATIENT
Start: 2018-08-30 | End: 2018-09-03

## 2018-08-30 RX ORDER — SODIUM CHLORIDE 9 MG/ML
250 INJECTION, SOLUTION INTRAVENOUS AS NEEDED
Status: DISCONTINUED | OUTPATIENT
Start: 2018-08-30 | End: 2018-09-04 | Stop reason: HOSPADM

## 2018-08-30 RX ADMIN — INSULIN LISPRO 5 UNITS: 100 INJECTION, SOLUTION INTRAVENOUS; SUBCUTANEOUS at 17:49

## 2018-08-30 RX ADMIN — FENTANYL CITRATE 25 MCG: 50 INJECTION, SOLUTION INTRAMUSCULAR; INTRAVENOUS at 09:10

## 2018-08-30 RX ADMIN — TIMOLOL MALEATE 1 DROP: 5 SOLUTION OPHTHALMIC at 09:15

## 2018-08-30 RX ADMIN — SODIUM CHLORIDE 125 ML/HR: 900 INJECTION, SOLUTION INTRAVENOUS at 06:16

## 2018-08-30 RX ADMIN — SODIUM CHLORIDE 125 ML/HR: 900 INJECTION, SOLUTION INTRAVENOUS at 13:35

## 2018-08-30 RX ADMIN — SODIUM CHLORIDE 40 MG: 9 INJECTION INTRAMUSCULAR; INTRAVENOUS; SUBCUTANEOUS at 08:53

## 2018-08-30 RX ADMIN — Medication 10 ML: at 01:11

## 2018-08-30 RX ADMIN — LATANOPROST 1 DROP: 50 SOLUTION OPHTHALMIC at 22:09

## 2018-08-30 RX ADMIN — AZELASTINE HYDROCHLORIDE 2 SPRAY: 137 SPRAY, METERED NASAL at 17:56

## 2018-08-30 RX ADMIN — Medication 10 ML: at 07:08

## 2018-08-30 RX ADMIN — INSULIN LISPRO 2 UNITS: 100 INJECTION, SOLUTION INTRAVENOUS; SUBCUTANEOUS at 07:07

## 2018-08-30 RX ADMIN — FERROUS SULFATE TAB 325 MG (65 MG ELEMENTAL FE) 325 MG: 325 (65 FE) TAB at 07:07

## 2018-08-30 RX ADMIN — DORZOLAMIDE HCL 1 DROP: 20 SOLUTION/ DROPS OPHTHALMIC at 09:14

## 2018-08-30 RX ADMIN — INSULIN LISPRO 2 UNITS: 100 INJECTION, SOLUTION INTRAVENOUS; SUBCUTANEOUS at 13:06

## 2018-08-30 RX ADMIN — Medication 10 ML: at 17:52

## 2018-08-30 RX ADMIN — DORZOLAMIDE HCL 1 DROP: 20 SOLUTION/ DROPS OPHTHALMIC at 22:09

## 2018-08-30 RX ADMIN — SODIUM CHLORIDE 40 MG: 9 INJECTION INTRAMUSCULAR; INTRAVENOUS; SUBCUTANEOUS at 01:09

## 2018-08-30 RX ADMIN — DORZOLAMIDE HCL 1 DROP: 20 SOLUTION/ DROPS OPHTHALMIC at 17:55

## 2018-08-30 RX ADMIN — SODIUM CHLORIDE 250 ML: 900 INJECTION, SOLUTION INTRAVENOUS at 13:35

## 2018-08-30 RX ADMIN — Medication 10 ML: at 09:10

## 2018-08-30 RX ADMIN — SODIUM CHLORIDE 125 ML/HR: 900 INJECTION, SOLUTION INTRAVENOUS at 17:54

## 2018-08-30 RX ADMIN — Medication 10 ML: at 22:09

## 2018-08-30 RX ADMIN — LATANOPROST 1 DROP: 50 SOLUTION OPHTHALMIC at 01:11

## 2018-08-30 RX ADMIN — DORZOLAMIDE HCL 1 DROP: 20 SOLUTION/ DROPS OPHTHALMIC at 01:11

## 2018-08-30 RX ADMIN — SODIUM CHLORIDE 40 MG: 9 INJECTION INTRAMUSCULAR; INTRAVENOUS; SUBCUTANEOUS at 22:08

## 2018-08-30 RX ADMIN — AZELASTINE HYDROCHLORIDE 2 SPRAY: 137 SPRAY, METERED NASAL at 09:15

## 2018-08-30 RX ADMIN — LOSARTAN POTASSIUM 50 MG: 50 TABLET ORAL at 08:52

## 2018-08-30 NOTE — CONSULTS
[facility] Consultation for: Pancreatic mass    Requesting Physician: Dr. Scott Solis    History of Present Illness:      Sarbjit Peterson is a [de-identified] y.o. female who I was kindly asked to see by Dr. Francheska Aldana for evaluation of a pancreatic mass. The patient presented after a fall and was noted to have abdominal pain, significant anemia and guaiac positive stools. She also complains of some left sided abdominal pain and back pain. She had a CT performed that shows a mass in the head of the pancreas with what appears to be numerous metastatic lesions in the small bowel mesentery, omentum and lungs. She also had an EGD on 8/28 that showed a post-bulbar ulcer and prominent duodenal folds concerning for a pancreatic head lesion infiltrating the duodenum. Biopsies did not reveal any evidence of malignant disease. She is set to undergo an EUS with biopsy tomorrow by Dr. Olga Doughrety. The patient has no new complaints today.       Past Medical History:   Diagnosis Date    Bradycardia     Diabetes ([address]     Hyperlipidemia     PUD (peptic ulcer disease)        Past Surgical History:   Procedure Laterality Date    CARDIAC SURG PROCEDURE UNLIST      pacemaker, bipass    COLONOSCOPY  2011    HX BUNIONECTOMY      right foot 11/24/07    HX CATARACT REMOVAL  07/2016       Social History     Social History    Marital status:      Spouse name: N/A    Number of children: 3    Years of education: N/A     Occupational History    retired      Social History Main Topics    Smoking status: Never Smoker    Smokeless tobacco: Never Used    Alcohol use No    Drug use: No    Sexual activity: Not Currently     Other Topics Concern    Not on file     Social History Narrative       Family History   Problem Relation Age of Onset    Diabetes Mother     Hypertension Father          Current Facility-Administered Medications:     0.9% sodium chloride infusion 250 mL, 250 mL, IntraVENous, PRN, Homa Tajik, MD, Last Rate: 15 mL/hr at 08/30/18 1335, 250 mL at 08/30/18 1335    acetaminophen (TYLENOL) tablet 650 mg, 650 mg, Oral, Q4H PRN, Veronika Espinal MD    0.9% sodium chloride infusion 250 mL, 250 mL, IntraVENous, PRN, Elaine Lund MD    0.9% sodium chloride infusion, 125 mL/hr, IntraVENous, CONTINUOUS, Leo Copeland MD, Stopped at 08/30/18 1400    pantoprazole (PROTONIX) 40 mg in sodium chloride 0.9% 10 mL injection, 40 mg, IntraVENous, Q12H, Leo Copeland MD, 40 mg at 08/30/18 0853    azelastine (ASTELIN) 137mcg/spray nasal spray, 2 Spray, Both Nostrils, BID, Leo Copeland MD, 2 [place] at 08/30/18 0915    dorzolamide (TRUSOPT) 2 % ophthalmic solution 1 Drop, 1 Drop, Both Eyes, TID, Leo Copeland MD, 1 Drop at 08/30/18 0914    diazePAM (VALIUM) tablet 2 mg, 2 mg, Oral, QHS PRN, Leo Copeland MD    ferrous sulfate tablet 325 mg, 1 Tab, Oral, ACB, Amilcar Foy MD, 325 mg at 08/30/18 0707    latanoprost (XALATAN) 0.005 % ophthalmic solution 1 Drop, 1 Drop, Both Eyes, QHS, Amilcar Foy MD, 1 Drop at 08/30/18 0111    losartan (COZAAR) tablet 50 mg, 50 mg, Oral, DAILY, Amilcar Foy MD, 50 mg at 08/30/18 0852    timolol (TIMOPTIC) 0.5 % ophthalmic solution 1 Drop, 1 Drop, Both Eyes, DAILY, Amilcar Foy MD, 1 Drop at 08/30/18 0915    sodium chloride (NS) flush 5-10 mL, 5-10 mL, IntraVENous, Q8H, Amilcar Foy MD, 10 mL at 08/30/18 0708    sodium chloride (NS) flush 5-10 mL, 5-10 mL, IntraVENous, PRN, Leo Copeland MD, 10 mL at 08/30/18 0910    fentaNYL citrate (PF) injection 25 mcg, 25 mcg, IntraVENous, Q4H PRN, Leo Copeland MD, 25 mcg at 08/30/18 0910    ondansetron (ZOFRAN) injection 4 mg, 4 mg, IntraVENous, Q4H PRN, Leo Copeland MD    insulin lispro (HUMALOG) injection, , SubCUTAneous, AC&HS, Leo Copeland MD, 2 Units at 08/30/18 1306    glucose chewable tablet 16 g, 4 Tab, Oral, PRN, Leo Copeland MD    dextrose (D50W) injection syrg 12.5-25 g, 12.5-25 g, IntraVENous, PRN, Melanie Ramos MD    glucagon (GLUCAGEN) injection 1 mg, 1 mg, IntraMUSCular, PRN, Amilcar Foy MD    No Known Allergies    ROS   Negative except as in HPI. Physical Exam:     Visit Vitals    /68 (BP 1 Location: Left arm, BP Patient Position: At rest)    Pulse 65    Temp 98.3 °F (36.8 °C)    Resp 16    Ht 5' 3\" (1.6 m)    Wt 172 lb 8 oz (78.2 kg)    SpO2 98%    BMI 30.56 kg/m2       General - alert and oriented, no apparent distress, well developed, morbidly obese. HEENT - NC/AT, no scleral icterus  Pulm - CTAB, normal inspiratory effort  CV - RRR, no M/R/G  Abd - soft, ND. Mildly TTP in LUQ. No rebound or guarding. No palpable masses.     Ext - warm, well perfused, no edema  Lymphatics - No cervical or supraclavicular adenopathy  Skin - supple and no rashes  Psychiatric - normal affect, good mood    Labs  Recent Results (from the past 24 hour(s))   GLUCOSE, POC    Collection Time: 08/29/18 10:13 PM   Result Value Ref Range    Glucose (POC) 134 (H) 65 - 100 mg/dL    Performed by DARRYL DEVI    CBC W/O DIFF    Collection Time: 08/30/18  4:39 AM   Result Value Ref Range    WBC 7.0 3.6 - 11.0 K/uL    RBC 2.17 (L) 3.80 - 5.20 M/uL    HGB 6.9 (L) 11.5 - 16.0 g/dL    HCT 21.5 (L) 35.0 - 47.0 %    MCV 99.1 (H) 80.0 - 99.0 FL    MCH 31.8 26.0 - 34.0 PG    MCHC 32.1 30.0 - 36.5 g/dL    RDW 16.3 (H) 11.5 - 14.5 %    PLATELET 453 (L) 722 - 400 K/uL    MPV 10.5 8.9 - 12.9 FL    NRBC 0.6 (H) 0  WBC    ABSOLUTE NRBC 0.04 (H) 0.00 - 0.01 K/uL   GLUCOSE, POC    Collection Time: 08/30/18  6:19 AM   Result Value Ref Range    Glucose (POC) 168 (H) 65 - 100 mg/dL    Performed by Leelee Cota    GLUCOSE, POC    Collection Time: 08/30/18 11:42 AM   Result Value Ref Range    Glucose (POC) 147 (H) 65 - 100 mg/dL    Performed by Vj Gomez    GLUCOSE, POC    Collection Time: 08/30/18  4:45 PM   Result Value Ref Range    Glucose (POC) 271 (H) 65 - 100 mg/dL Performed by Rosalia Valencia        CA 19-9: 1  CEA: 4    Imaging  8/27/18 CT A/P: IMPRESSION:     1. Mass lesion within the head of the pancreas with atrophy of the body and tail  highly concerning for adenocarcinoma. Endoscopic ultrasound with biopsy is  recommended  2. Thickening of the second portion of the duodenum which is likely due to  direct invasion of the adjacent pancreatic adenocarcinoma  3. Innumerable small nodules within the small bowel mesentery and omentum  concerning for metastatic spread  4. Innumerable small nodules at the lung bases. Given findings in the abdomen  this is concerning for metastatic disease    8/27/18 Abd U/S: IMPRESSION:  1. Pancreatic head mass and biliary dilation. 2. Trace gallbladder sludge without stones. 3. Uniform liver. 8/28/18 CTA chest: IMPRESSION:   1. No Pulmonary Embolus. 2. Multiple pulmonary nodules, likely metastases. I have personally reviewed all of the pertinent images     Assessment:     Rosa Cates is a [de-identified] y.o. female with what appears to be metastatic pancreatic cancer and GI bleed due to local invasion to the duodenum. Recommendations:     1. Agree with plans for EUS and biopsy for definitive diagnosis. I do not think surgery will end up playing a role here as she appears to have widely metastatic disease, unless it becomes necessary for palliative purposes. She does not describe symptoms of or have imaging findings to suggest a duodenal obstruction at this point. She is off systemic anticoagulation at this time, but if cancer is diagnosed and she has continued bleeding, then palliative radiation may be helpful. Thank you for this consultation.     Dolly Hansen MD  8/30/2018  5:32 PM

## 2018-08-30 NOTE — PROGRESS NOTES
NUTRITION COMPLETE ASSESSMENT 
 
RECOMMENDATIONS:  
1. Diet advancement per GI after EUS 
 - add Glucerna TID 2. Weekly weights Interventions/Plan:  
Food/Nutrient Delivery:    Commercial supplement (Glucerna TID once diet advanced) Assessment:  
Reason for Assessment: [x]NPO/Clear Liquid x 3 days Diet: Clear liquids Supplements: none Nutritionally Significant Medications: [x] Reviewed & Includes: iron, SSI, protonix, NS @ 125ml/hr Meal Intake: (clear liquids) Patient Vitals for the past 100 hrs: 
 % Diet Eaten 08/29/18 1705 100 % 08/29/18 1243 100 % 08/29/18 0905 100 % 08/28/18 1814 100 % Pre-Hospitalization: 
Usual Appetite: Poor Diet at Home: soft Current Hospitalization:  
Appetite:   
PO Ability: Independent Average po intake:% (of clear liquids) Average supplements intake:    
  
Subjective: \"My daughter is talking to the doctors. She's doing all that. \" 
 
Objective: 
Pt admitted for UGIB. PMHx: DM, CAD. Abd pain and N/V/D since June (2 months). New pancreatic mass and omental/pulmonary nodules found this admit. Duodenal biopsy negative per oncology noted. Seen by GI with plans for EUS tomorrow. Pt visited but not able to (or wanting to) answer questions. Does admit to poor appetite prior to admit, doing mostly liquids. Only on clear liquids at this time, but once diet advanced to fulls recommend adding Glucerna TID (660kcal, 30g protein). No recent wt loss noted. Wt Readings:  
08/29/18 78.2 kg (172 lb 8 oz) 07/17/18 74.8 kg (164 lb 12.8 oz) 05/21/18 76.7 kg (169 lb) 05/01/18 74.3 kg (163 lb 12.8 oz) Will follow for EUS results and treatment plan, diet advancement/tolerance, supplement acceptance, and wt trends. Estimated Nutrition Needs:  
Kcals/day: 6962 Kcals/day (1455-1577kcal) Protein: 78 g (78-86g (1-1.1g/kg)) Fluid: 1500 ml (1ml/kcal) Based On: Kiana [address] [place] (x 1.2-1.3) Weight Used: Actual wt (78.2kg) Pt expected to meet estimated nutrient needs:  []   Yes     []  No [x] Unable to predict at this time Nutrition Diagnosis:  
1. Inadequate oral intake related to altered GI fx as evidenced by ?pancreatic CA w/ abd pain, N/V/D x 2 months PTA; clears/NPO x 3 days Goals:   
 Diet advancement and tolerance of at least 50% of meals and 1-2 supplements/day in 5-7 days Monitoring & Evaluation: - Total energy intake, Liquid meal replacement, Protein intake - Weight/weight change, GI 
 
Previous Nutrition Goals Met:   N/A Previous Recommendations:    N/A Education & Discharge Needs: 
 [x] None Identified 
 [] Identified and addressed  
 [] Participated in care plan, discharge planning, and/or interdisciplinary rounds Cultural, Temple and ethnic food preferences identified: None Skin Integrity: [x]Intact  []Other Edema: [x]None []Other Last BM: 8/29 Food Allergies: [x]None []Other Diet Restrictions: Cultural/Episcopalian Preference(s): None Anthropometrics:   
Weight Loss Metrics 8/29/2018 7/17/2018 5/21/2018 5/1/2018 11/3/2017 7/11/2017 4/4/2017 Today's Wt 172 lb 8 oz 164 lb 12.8 oz 169 lb 163 lb 12.8 oz 181 lb 174 lb 180 lb BMI 30.56 kg/m2 29.19 kg/m2 29.94 kg/m2 29.02 kg/m2 32.06 kg/m2 30.82 kg/m2 31.89 kg/m2 Weight Source: Standing scale (comment) Height: 5' 3\" (160 cm), Body mass index is 30.56 kg/(m^2). IBW : 52.2 kg (115 lb), % IBW (Calculated): 150 % Usual Body Weight: 74.8 kg (165 lb),   
 
Labs:   
Lab Results Component Value Date/Time Sodium 141 08/28/2018 04:48 AM  
 Potassium 3.4 (L) 08/28/2018 04:48 AM  
 Chloride 106 08/28/2018 04:48 AM  
 CO2 23 08/28/2018 04:48 AM  
 Glucose 153 (H) 08/28/2018 04:48 AM  
 BUN 30 (H) 08/28/2018 04:48 AM  
 Creatinine 0.70 08/28/2018 04:48 AM  
 Calcium 8.9 08/28/2018 04:48 AM  
 Magnesium 1.6 08/27/2018 03:42 AM  
 Phosphorus 2.1 (L) 08/27/2018 03:42 AM  
 Albumin 3.1 (L) 08/28/2018 04:48 AM  
 
Lab Results Component Value Date/Time Hemoglobin A1c 7.8 (H) 07/17/2018 12:00 AM  
 
Mary Marie RD  Pager #[phone]

## 2018-08-30 NOTE — PROGRESS NOTES
Problem: Patient Education: Go to Patient Education Activity Goal: Patient/Family Education Spoke with Wandy-daughter and POA. She was  expressed concern, that she had not spoken with a doctor today. She was hearing bits and pieces from her mother, and she stated \"I am ready to transfer her to another hospital.\" . I explained which Doctors had seen the patient today , at different times ( not all at once , as pt explained) . I clarified that all the doctors were waiting for the results of the biopsy taken during the EGD. Bobby Perry said Dr. David Garcia said 2 days for the results.)  I gave [facility], names Dr. Gail Gomes, Dr. David Garcia and Brandi Diop with telephone numbers, that she could contact them. , if they did not contact her.

## 2018-08-30 NOTE — PROGRESS NOTES
[facility] 
[address] [facility], [address] GI PROGRESS NOTE Jose Pinon, [address] office [phone] NP in-hospital cell phone M-F until 4:30 After 5pm or on weekends, please call  for physician on call NAME: Mark Julian :  1940 MRN:  [id number] Subjective: She denies GI complaint. Objective: VITALS:  
Last 24hrs VS reviewed since prior progress note. Most recent are: 
Visit Vitals  /60 (BP 1 Location: Right arm, BP Patient Position: At rest)  Pulse 67  Temp 98.6 °F (37 °C)  Resp 18  Ht 5' 3\" (1.6 m)  Wt 78.2 kg (172 lb 8 oz)  SpO2 98%  BMI 30.56 kg/m2 PHYSICAL EXAM: 
General: Cooperative, no acute distress   
Neurologic:  Alert and oriented X 3. HEENT: EOMI, no scleral icterus Lungs:  CTA bilaterally. No wheezing Heart:  S1 S2, regular rhythm, no murmur Abdomen: Soft, non-distended, generalized tenderness. +Bowel sounds Extremities: No edema Psych:   Questionable insight. Not anxious or agitated. Lab Data Reviewed:  
 
Recent Results (from the past 24 hour(s)) GLUCOSE, POC Collection Time: 18  3:43 PM  
Result Value Ref Range Glucose (POC) 272 (H) 65 - 100 mg/dL Performed by Inessa Stokes GLUCOSE, POC Collection Time: 18 10:13 PM  
Result Value Ref Range Glucose (POC) 134 (H) 65 - 100 mg/dL Performed by WHITE MARITO   
CBC W/O DIFF Collection Time: 18  4:39 AM  
Result Value Ref Range WBC 7.0 3.6 - 11.0 K/uL  
 RBC 2.17 (L) 3.80 - 5.20 M/uL HGB 6.9 (L) 11.5 - 16.0 g/dL HCT 21.5 (L) 35.0 - 47.0 % MCV 99.1 (H) 80.0 - 99.0 FL  
 MCH 31.8 26.0 - 34.0 PG  
 MCHC 32.1 30.0 - 36.5 g/dL  
 RDW 16.3 (H) 11.5 - 14.5 % PLATELET 957 (L) 556 - 400 K/uL MPV 10.5 8.9 - 12.9 FL  
 NRBC 0.6 (H) 0  WBC ABSOLUTE NRBC 0.04 (H) 0.00 - 0.01 K/uL GLUCOSE, POC Collection Time: 18  6:19 AM  
Result Value Ref Range Glucose (POC) 168 (H) 65 - 100 mg/dL Performed by Denise Nunez   
GLUCOSE, POC Collection Time: 08/30/18 11:42 AM  
Result Value Ref Range Glucose (POC) 147 (H) 65 - 100 mg/dL Performed by Bear Pabon Assessment:  
· GI bleed: anemia with hemoccult positive stool. Hgb 6.9 Status post 2 units PRBCs; EGD 8/28 duodenal ulcer with prominent duodenal folds with marked narrowing,unable to pass scope, concern that pancreatic head lesion is infiltrating duodenum; biopsy non diagnostic, normal small bowel · Abdominal pain: WBC 7.0. CT abdomen/pelvis without contrast (8/27/18): mass lesion within the head of the pancreas with atrophy of the body and tail highly concerning for adenocarcinoma · Suspected metastatic pancreatic adenocarcinoma: CT abdomen/pelvis without contrast (8/27/18) · Elevated LFTs: improving AST: 169, ALT: 296, alkaline phosphatase: 224, total bilirubin: normal.  
 
Patient Active Problem List  
Diagnosis Code  Glaucoma H40.9  GERD (gastroesophageal reflux disease) K21.9  
 Uncontrolled type 2 diabetes mellitus with diabetic neuropathy, with long-term current use of insulin (HCC) E11.40, Z79.4, E11.65  
 Essential hypertension, benign I10  
 Hyperlipidemia E78.5  Anxiety F41.9  Osteoarthritis M19.90  Cardiac pacemaker in situ Z95.0  Diabetes ([facility] [address] E11.9  Acute upper GI bleed K92.2 Plan:  
· NPO at midnight · Plan for EUS tomorrow with Dr. Nelda Herrera at 11:30 am, risks discussed with daughter Miracle Orozco - please obtain consent · Continue PPI 
· Oncology following · Continue supportive measures Signed By: Isaac Langford NP   
 8/30/2018  1:24 PM 
  
 
 
 
 
 Patient seen and examined, agree with plans, Dr. Van Pedro to do EUS tomorrow Davi Shabazz MD

## 2018-08-30 NOTE — PROGRESS NOTES
Called Dr Galileo Bryant office to inform the nurse and Dr Galileo Bryant that the patient's daughter would like to speak with him and that she called his office several times. Spoke with Chaim Aguilar who stated that the patients daughter have already called and Dr Galileo Bryant is aware and seeing patients and he will call back when he gets a chance.

## 2018-08-30 NOTE — PROGRESS NOTES
Spiritual Care Partner Volunteer visited patient in room 201/2on 8.30.18. Documented by: : Rev. Ed Fontana. Frances Fenton; [facility], to contact [phone] Ramon Hanks call: [phone]

## 2018-08-30 NOTE — PROGRESS NOTES
Hematology-Oncology Progress Note Roe Trevino 1940 
[id number] 
8/30/2018 Follow-up for: pancreatic carcinoma? [x]        Chart notes since last visit reviewed [x]        Medications reviewed for allergies and interactions Case discussed with the following:         []            
               []        Nursing Staff  
                                                                      []        Pathologist 
                                                                      []        FAMILY 
                                                                     gastroenterology Subjective:  
 
Spoke with patient who complains of: pt. Is comfortable today, no c/o pain, bleeding Objective:  
 
Patient Vitals for the past 24 hrs: 
 BP Temp Pulse Resp SpO2  
08/30/18 0904 131/60 98.6 °F (37 °C) 67 18 98 % 08/30/18 0307 111/71 98.6 °F (37 °C) 65 18 98 % 08/29/18 2124 90/60 98.5 °F (36.9 °C) 66 18 99 % 08/29/18 2100 - - - - 91 % 08/29/18 1352 110/67 98.6 °F (37 °C) 67 18 99 % REVIEW OF SYSTEMS:   
Constitutional: negative fever, negative chills, negative weight loss Eyes:   negative visual changes ENT:   negative sore throat, tongue or lip swelling Respiratory:  negative cough, negative dyspnea Cards:  negative for chest pain, palpitations, lower extremity edema GI:   negative for nausea, vomiting, diarrhea, and abdominal pain Neuro:  negative for headaches, dizziness, vertigo [x]                        Full ROS o/w normal/non contributor Constitutional:  Patient looks []        Sick []        Frail 
[x]        Better                                                
[]        Depressed HEENT:  [x]   NC                         []   AT               []    ALOPECIA Eyes: [x]   Normal               []    Icteric Oropharynx: [x]    Normal                  []  Thrush               []   Dry 
 Mucositis: [x]    None                 Grade: []        I  []        II  []        III  []        IV Neck:   [x]   Supple                  []  Rigid JVD:    [x]   ABSENT       []   PRESENT Lymphadenopathy:   [x]   None Noted            []   PRESENT Chest: 
[x]   Clear               []    Rhonchi                      Dec'd @     []  Right Base           []   Left Base CV:             [x]   Regular              []  Irregular               []   Tachy                []   Murmur Abdominal:   [x]    Soft              [x]   NON-tender               []   Tender BS:    []   ABSENT                   [x]   PRESENT Liver:     [x]  NON-palp                  []   EDGE- palp Spleen: [x]   NON-palp                   []  EDGE - palp Mass:   [x]   ABSENT                          []  PRESENT Extr:    [x]  Lymphedema             []   Cyanosis      []  Clubbing Edema:     [x]   NONE       []   PRESENT Skin:  Intact [x]           Purpura [] Rash: [x]   ABSENT       []  PRESENT Neuro: 
[x]        Normal 
[]        Confused Available labs reviewed: 
Labs:   
Recent Results (from the past 24 hour(s)) GLUCOSE, POC Collection Time: 08/29/18 10:53 AM  
Result Value Ref Range Glucose (POC) 210 (H) 65 - 100 mg/dL Performed by EUDOWEBaaliyah Vizimaxon GLUCOSE, POC Collection Time: 08/29/18  3:43 PM  
Result Value Ref Range Glucose (POC) 272 (H) 65 - 100 mg/dL Performed by [email or web address] Vizimaxon GLUCOSE, POC Collection Time: 08/29/18 10:13 PM  
Result Value Ref Range Glucose (POC) 134 (H) 65 - 100 mg/dL Performed by WHITE MARITO   
CBC W/O DIFF Collection Time: 08/30/18  4:39 AM  
Result Value Ref Range WBC 7.0 3.6 - 11.0 K/uL  
 RBC 2.17 (L) 3.80 - 5.20 M/uL HGB 6.9 (L) 11.5 - 16.0 g/dL HCT 21.5 (L) 35.0 - 47.0 % MCV 99.1 (H) 80.0 - 99.0 FL  
 MCH 31.8 26.0 - 34.0 PG  
 MCHC 32.1 30.0 - 36.5 g/dL  
 RDW 16.3 (H) 11.5 - 14.5 % PLATELET 837 (L) 155 - 400 K/uL MPV 10.5 8.9 - 12.9 FL  
 NRBC 0.6 (H) 0  WBC ABSOLUTE NRBC 0.04 (H) 0.00 - 0.01 K/uL GLUCOSE, POC Collection Time: 08/30/18  6:19 AM  
Result Value Ref Range Glucose (POC) 168 (H) 65 - 100 mg/dL Performed by Nicolas Lopes Available Xrays reviewed: 
 
Chemotherapy monitored and toxicities assessed: 
 
Assessment and Plan 1. ?pancreatic carcinoma. .. She is s/p egd and biopsy 8/28 the path is negative   cea and ca19;9 are normal.. Marilyn Erinannelise Sanders Erin She is not sure if she wants to get treated , her fund of knowledge is compromised and makes treatment discussions challenging, plans for eus bx in the works, 
2. Anemia. . Secondary to presumed gi bleeding, hgb is 6.9 today. Will order one unit 3. Deconditioning. .. Physical therapy to see Lawyer Gabe MD

## 2018-08-30 NOTE — PROGRESS NOTES
Bedside shift change report given to Lashanda Mai (oncoming nurse) by Greg Stoll (offgoing nurse). Report included the following information SBAR, Kardex and MAR.

## 2018-08-30 NOTE — PROGRESS NOTES
Hospitalist Progress Note Daren Fleischer, MD 
Answering service: [phone] OR [phone] from in house phone Date of Service:  2018 NAME:  Emilio Bonds :  1940 MRN:  [id number] Admission Summary:  
[de-identified] yo woman with DM2, dyslipidemia, CAD s/p CABG, SSS s/p PPM presented to the ED from home on 18 with worsening abdominal pain, nausea, vomiting. CT a/p in the ED showed findings worrisome for metastatic pancreatic adenocarcinoma. She was admitted with significant anemia and heme-positive stool likely due to UGIB. Interval history / Subjective:  
C/o abdo pain and NPO for possible EUS today; d/w nurse, 1 unit PRBC ordered Assessment & Plan:  
 
Acute blood loss anemia (POA) - s/p 2 unit  PRBC with 1 unit ordered today 
- likely due to metastatic pancreatic CA exacerbated by GI blood loss  
- iron, B12, folate WNL 
   
GI bleed (POA) - FOBT + 
- IV PPI BID 
- pt denies hematochezia/melena - GI consulted - s/p EGD ; pathology benign - CA 19-9 and CEA WNL 
- hold ASA and apixaban 
- EUS today or tomorrow  
   
Prerenal azotemia (POA) - likely due to GI bleed 
- continue IVF and BT 
- hold home HCTZ 
   
DM2 - last A1c 7.8 (2018),SSI 
- hold home metformin, insulin detemir, linaclotide, pioglitazone, glipizide 
- resume meds as appropriate 
   
Elevated LFTs - RUQ US  Pancreatic head mass and biliary dilation. Trace gallbladder sludge without stones. Uniform liver. - trending down 
   
Metastatic pancreatic CA - Oncology following -  pathology benign - CA 19-9 and CEA WNL 
- for EUS today or tomorrow 
   
Multiple b/l lung nodules (POA) - seen on CXR 
- CTA chest  Multiple pulmonary nodules, likely metastases. 
   
SSS s/p PPM - hold apixaban and ASA for now 
   
DLD - hold home statin due to transaminitis 
   
HTN - controlled on home meds; hold home HCTZ while on IVF 
   
 CAD s/p CABG - hold ASA (due to GIB) and statin (due to transaminitis) 
- resumed home ARB 
- no BB on PTA med list 
  
Mild hypokalemia - replete prn 
   
Code status: Full possibly advance stage 4 metastatic pancreatic ca need detailed discussion by oncology to define [address] once diagnosis is established DVT prophylaxis:SCD  
  
Poor prognosis Care Plan discussed with: Patient/Family, Nurse,  and Consultant GI Disposition: TBD. Likely dc to [facility]. SNF when medically stable for discharge Hospital Problems  Date Reviewed: 8/27/2018 Codes Class Noted POA * (Principal)Acute upper GI bleed ICD-10-CM: K92.2 ICD-9-CM: 578.9  8/27/2018 Yes Review of Systems:  
Pertinent items are noted in HPI. Vital Signs:  
 Last 24hrs VS reviewed since prior progress note. Most recent are: 
Visit Vitals  /60 (BP 1 Location: Right arm, BP Patient Position: At rest)  Pulse 67  Temp 98.6 °F (37 °C)  Resp 18  Ht 5' 3\" (1.6 m)  Wt 78.2 kg (172 lb 8 oz)  SpO2 98%  BMI 30.56 kg/m2 Intake/Output Summary (Last 24 hours) at 08/30/18 1206 Last data filed at 08/29/18 1705 Gross per 24 hour Intake             1260 ml Output                0 ml Net             1260 ml Physical Examination:  
 
Gen: awake, NAD, obese CVS: regular rhythm, normal rate, no m/r/g appreciated, no peripheral edema Lungs: CTAB anteriorly, no w/r/r Abd: +BS, soft, ND, NT 
MSK: NICOLAS Neuro: awake, alert, responds appropriately to questions and commands but very talkative Data Review:  
 Review and/or order of clinical lab test 
Review and/or order of tests in the radiology section of CPT Review and/or order of tests in the medicine section of CPT Labs:  
 
Recent Labs 08/30/18 
 7253  08/29/18 
 5260 WBC  7.0  7.9 HGB  6.9*  7.5* HCT  21.5*  23.2*  
PLT  126*  138* Recent Labs  
   08/28/18 
 0448 NA  141  
K  3.4*  
CL  106 CO2  23 BUN  30* CREA  0.70 GLU  153* CA  8.9 Recent Labs  
   08/28/18 
 0448 SGOT  169* ALT  296* AP  224* TBILI  0.7 TP  6.0* ALB  3.1*  
GLOB  2.9 No results for input(s): INR, PTP, APTT in the last 72 hours. No lab exists for component: INREXT No results for input(s): FE, TIBC, PSAT, FERR in the last 72 hours. Lab Results Component Value Date/Time Folate 25.3 (H) 08/27/2018 03:42 AM  
  
No results for input(s): PH, PCO2, PO2 in the last 72 hours. No results for input(s): CPK, CKNDX, TROIQ in the last 72 hours. No lab exists for component: CPKMB Lab Results Component Value Date/Time Cholesterol, total 82 08/28/2018 04:48 AM  
 HDL Cholesterol 38 08/28/2018 04:48 AM  
 LDL, calculated 27.6 08/28/2018 04:48 AM  
 Triglyceride 82 08/28/2018 04:48 AM  
 CHOL/HDL Ratio 2.2 08/28/2018 04:48 AM  
 
Lab Results Component Value Date/Time Glucose (POC) 147 (H) 08/30/2018 11:42 AM  
 Glucose (POC) 168 (H) 08/30/2018 06:19 AM  
 Glucose (POC) 134 (H) 08/29/2018 10:13 PM  
 Glucose (POC) 272 (H) 08/29/2018 03:43 PM  
 Glucose (POC) 210 (H) 08/29/2018 10:53 AM  
 
Lab Results Component Value Date/Time Color YELLOW/STRAW 08/27/2018 04:37 AM  
 Appearance CLEAR 08/27/2018 04:37 AM  
 Specific gravity 1.019 08/27/2018 04:37 AM  
 pH (UA) 5.0 08/27/2018 04:37 AM  
 Protein NEGATIVE  08/27/2018 04:37 AM  
 Glucose NEGATIVE  08/27/2018 04:37 AM  
 Ketone TRACE (A) 08/27/2018 04:37 AM  
 Bilirubin NEGATIVE  08/27/2018 04:37 AM  
 Urobilinogen 0.2 08/27/2018 04:37 AM  
 Nitrites NEGATIVE  08/27/2018 04:37 AM  
 Leukocyte Esterase NEGATIVE  08/27/2018 04:37 AM  
 Epithelial cells FEW 08/27/2018 04:37 AM  
 Bacteria NEGATIVE  08/27/2018 04:37 AM  
 WBC 0-4 08/27/2018 04:37 AM  
 RBC 0-5 08/27/2018 04:37 AM  
 
 
 
Medications Reviewed:  
 
Current Facility-Administered Medications Medication Dose Route Frequency  0.9% sodium chloride infusion 250 mL  250 mL IntraVENous PRN  
  acetaminophen (TYLENOL) tablet 650 mg  650 mg Oral Q4H PRN  
 0.9% sodium chloride infusion 250 mL  250 mL IntraVENous PRN  
 0.9% sodium chloride infusion  125 mL/hr IntraVENous CONTINUOUS  
 pantoprazole (PROTONIX) 40 mg in sodium chloride 0.9% 10 mL injection  40 mg IntraVENous Q12H  
 azelastine (ASTELIN) 137mcg/spray nasal spray  2 Spray Both Nostrils BID  dorzolamide (TRUSOPT) 2 % ophthalmic solution 1 Drop  1 Drop Both Eyes TID  diazePAM (VALIUM) tablet 2 mg  2 mg Oral QHS PRN  
 ferrous sulfate tablet 325 mg  1 Tab Oral ACB  latanoprost (XALATAN) 0.005 % ophthalmic solution 1 Drop  1 Drop Both Eyes QHS  losartan (COZAAR) tablet 50 mg  50 mg Oral DAILY  timolol (TIMOPTIC) 0.5 % ophthalmic solution 1 Drop  1 Drop Both Eyes DAILY  sodium chloride (NS) flush 5-10 mL  5-10 mL IntraVENous Q8H  
 sodium chloride (NS) flush 5-10 mL  5-10 mL IntraVENous PRN  
 fentaNYL citrate (PF) injection 25 mcg  25 mcg IntraVENous Q4H PRN  
 ondansetron (ZOFRAN) injection 4 mg  4 mg IntraVENous Q4H PRN  
 insulin lispro (HUMALOG) injection   SubCUTAneous AC&HS  
 glucose chewable tablet 16 g  4 Tab Oral PRN  
 dextrose (D50W) injection syrg 12.5-25 g  12.5-25 g IntraVENous PRN  
 glucagon (GLUCAGEN) injection 1 mg  1 mg IntraMUSCular PRN  
 
______________________________________________________________________ EXPECTED LENGTH OF STAY: 3d 2h 
ACTUAL LENGTH OF STAY:          3 Daren Fleischer, MD

## 2018-08-31 ENCOUNTER — APPOINTMENT (OUTPATIENT)
Dept: ULTRASOUND IMAGING | Age: 78
DRG: 435 | End: 2018-08-31
Attending: INTERNAL MEDICINE
Payer: MEDICARE

## 2018-08-31 ENCOUNTER — ANESTHESIA (OUTPATIENT)
Dept: ENDOSCOPY | Age: 78
DRG: 435 | End: 2018-08-31
Payer: MEDICARE

## 2018-08-31 LAB
ABO + RH BLD: NORMAL
ALBUMIN SERPL-MCNC: 2.8 G/DL (ref 3.5–5)
ALBUMIN/GLOB SERPL: 0.9 {RATIO} (ref 1.1–2.2)
ALP SERPL-CCNC: 172 U/L (ref 45–117)
ALT SERPL-CCNC: 125 U/L (ref 12–78)
ANION GAP SERPL CALC-SCNC: 10 MMOL/L (ref 5–15)
AST SERPL-CCNC: 38 U/L (ref 15–37)
BILIRUB SERPL-MCNC: 0.7 MG/DL (ref 0.2–1)
BLD PROD TYP BPU: NORMAL
BLOOD GROUP ANTIBODIES SERPL: NORMAL
BPU ID: NORMAL
BUN SERPL-MCNC: 7 MG/DL (ref 6–20)
BUN/CREAT SERPL: 9 (ref 12–20)
CALCIUM SERPL-MCNC: 8.6 MG/DL (ref 8.5–10.1)
CHLORIDE SERPL-SCNC: 107 MMOL/L (ref 97–108)
CO2 SERPL-SCNC: 21 MMOL/L (ref 21–32)
CREAT SERPL-MCNC: 0.75 MG/DL (ref 0.55–1.02)
CROSSMATCH RESULT,%XM: NORMAL
ERYTHROCYTE [DISTWIDTH] IN BLOOD BY AUTOMATED COUNT: 18.3 % (ref 11.5–14.5)
GLOBULIN SER CALC-MCNC: 3.1 G/DL (ref 2–4)
GLUCOSE BLD STRIP.AUTO-MCNC: 145 MG/DL (ref 65–100)
GLUCOSE BLD STRIP.AUTO-MCNC: 191 MG/DL (ref 65–100)
GLUCOSE BLD STRIP.AUTO-MCNC: 216 MG/DL (ref 65–100)
GLUCOSE BLD STRIP.AUTO-MCNC: 221 MG/DL (ref 65–100)
GLUCOSE SERPL-MCNC: 191 MG/DL (ref 65–100)
HCT VFR BLD AUTO: 28.2 % (ref 35–47)
HGB BLD-MCNC: 9.1 G/DL (ref 11.5–16)
MAGNESIUM SERPL-MCNC: 1.4 MG/DL (ref 1.6–2.4)
MCH RBC QN AUTO: 31.3 PG (ref 26–34)
MCHC RBC AUTO-ENTMCNC: 32.3 G/DL (ref 30–36.5)
MCV RBC AUTO: 96.9 FL (ref 80–99)
NRBC # BLD: 0.02 K/UL (ref 0–0.01)
NRBC BLD-RTO: 0.3 PER 100 WBC
PHOSPHATE SERPL-MCNC: 2.5 MG/DL (ref 2.6–4.7)
PLATELET # BLD AUTO: 152 K/UL (ref 150–400)
PMV BLD AUTO: 10.1 FL (ref 8.9–12.9)
POTASSIUM SERPL-SCNC: 3.6 MMOL/L (ref 3.5–5.1)
PROT SERPL-MCNC: 5.9 G/DL (ref 6.4–8.2)
RBC # BLD AUTO: 2.91 M/UL (ref 3.8–5.2)
SERVICE CMNT-IMP: ABNORMAL
SODIUM SERPL-SCNC: 138 MMOL/L (ref 136–145)
SPECIMEN EXP DATE BLD: NORMAL
STATUS OF UNIT,%ST: NORMAL
UNIT DIVISION, %UDIV: 0
WBC # BLD AUTO: 6.1 K/UL (ref 3.6–11)

## 2018-08-31 PROCEDURE — 85027 COMPLETE CBC AUTOMATED: CPT | Performed by: INTERNAL MEDICINE

## 2018-08-31 PROCEDURE — 88307 TISSUE EXAM BY PATHOLOGIST: CPT

## 2018-08-31 PROCEDURE — 84100 ASSAY OF PHOSPHORUS: CPT | Performed by: INTERNAL MEDICINE

## 2018-08-31 PROCEDURE — 74011000258 HC RX REV CODE- 258

## 2018-08-31 PROCEDURE — 74011250636 HC RX REV CODE- 250/636

## 2018-08-31 PROCEDURE — 82962 GLUCOSE BLOOD TEST: CPT

## 2018-08-31 PROCEDURE — 74011000250 HC RX REV CODE- 250: Performed by: INTERNAL MEDICINE

## 2018-08-31 PROCEDURE — 74011636637 HC RX REV CODE- 636/637: Performed by: INTERNAL MEDICINE

## 2018-08-31 PROCEDURE — 76040000008: Performed by: INTERNAL MEDICINE

## 2018-08-31 PROCEDURE — 76060000033 HC ANESTHESIA 1 TO 1.5 HR: Performed by: INTERNAL MEDICINE

## 2018-08-31 PROCEDURE — 83735 ASSAY OF MAGNESIUM: CPT | Performed by: INTERNAL MEDICINE

## 2018-08-31 PROCEDURE — 74011250636 HC RX REV CODE- 250/636: Performed by: INTERNAL MEDICINE

## 2018-08-31 PROCEDURE — 77030036673 HC NDL BIOP ENDOSC SHRKCOR PRELD COVD -E: Performed by: INTERNAL MEDICINE

## 2018-08-31 PROCEDURE — 80053 COMPREHEN METABOLIC PANEL: CPT | Performed by: INTERNAL MEDICINE

## 2018-08-31 PROCEDURE — 30233N1 TRANSFUSION OF NONAUTOLOGOUS RED BLOOD CELLS INTO PERIPHERAL VEIN, PERCUTANEOUS APPROACH: ICD-10-PCS | Performed by: HOSPITALIST

## 2018-08-31 PROCEDURE — 36415 COLL VENOUS BLD VENIPUNCTURE: CPT | Performed by: INTERNAL MEDICINE

## 2018-08-31 PROCEDURE — 88172 CYTP DX EVAL FNA 1ST EA SITE: CPT | Performed by: INTERNAL MEDICINE

## 2018-08-31 PROCEDURE — 0FBG8ZX EXCISION OF PANCREAS, VIA NATURAL OR ARTIFICIAL OPENING ENDOSCOPIC, DIAGNOSTIC: ICD-10-PCS | Performed by: INTERNAL MEDICINE

## 2018-08-31 PROCEDURE — 88305 TISSUE EXAM BY PATHOLOGIST: CPT | Performed by: INTERNAL MEDICINE

## 2018-08-31 PROCEDURE — 88173 CYTOPATH EVAL FNA REPORT: CPT | Performed by: INTERNAL MEDICINE

## 2018-08-31 PROCEDURE — C9113 INJ PANTOPRAZOLE SODIUM, VIA: HCPCS | Performed by: INTERNAL MEDICINE

## 2018-08-31 PROCEDURE — 65660000000 HC RM CCU STEPDOWN

## 2018-08-31 RX ORDER — SODIUM CHLORIDE 9 MG/ML
100 INJECTION, SOLUTION INTRAVENOUS CONTINUOUS
Status: DISCONTINUED | OUTPATIENT
Start: 2018-08-31 | End: 2018-09-01

## 2018-08-31 RX ORDER — SODIUM CHLORIDE 0.9 % (FLUSH) 0.9 %
5-10 SYRINGE (ML) INJECTION EVERY 8 HOURS
Status: ACTIVE | OUTPATIENT
Start: 2018-08-31 | End: 2018-08-31

## 2018-08-31 RX ORDER — ATROPINE SULFATE 0.1 MG/ML
0.5 INJECTION INTRAVENOUS
Status: DISCONTINUED | OUTPATIENT
Start: 2018-08-31 | End: 2018-08-31 | Stop reason: HOSPADM

## 2018-08-31 RX ORDER — EPINEPHRINE 0.1 MG/ML
1 INJECTION INTRACARDIAC; INTRAVENOUS
Status: DISCONTINUED | OUTPATIENT
Start: 2018-08-31 | End: 2018-08-31 | Stop reason: HOSPADM

## 2018-08-31 RX ORDER — LIDOCAINE HYDROCHLORIDE 20 MG/ML
INJECTION, SOLUTION EPIDURAL; INFILTRATION; INTRACAUDAL; PERINEURAL AS NEEDED
Status: DISCONTINUED | OUTPATIENT
Start: 2018-08-31 | End: 2018-08-31 | Stop reason: HOSPADM

## 2018-08-31 RX ORDER — FENTANYL CITRATE 50 UG/ML
200 INJECTION, SOLUTION INTRAMUSCULAR; INTRAVENOUS
Status: DISCONTINUED | OUTPATIENT
Start: 2018-08-31 | End: 2018-08-31 | Stop reason: HOSPADM

## 2018-08-31 RX ORDER — IPRATROPIUM BROMIDE AND ALBUTEROL SULFATE 2.5; .5 MG/3ML; MG/3ML
3 SOLUTION RESPIRATORY (INHALATION)
Status: DISCONTINUED | OUTPATIENT
Start: 2018-08-31 | End: 2018-09-04 | Stop reason: HOSPADM

## 2018-08-31 RX ORDER — MAGNESIUM SULFATE HEPTAHYDRATE 40 MG/ML
2 INJECTION, SOLUTION INTRAVENOUS ONCE
Status: COMPLETED | OUTPATIENT
Start: 2018-08-31 | End: 2018-08-31

## 2018-08-31 RX ORDER — DEXTROMETHORPHAN/PSEUDOEPHED 2.5-7.5/.8
1.2 DROPS ORAL
Status: DISCONTINUED | OUTPATIENT
Start: 2018-08-31 | End: 2018-08-31 | Stop reason: HOSPADM

## 2018-08-31 RX ORDER — NALOXONE HYDROCHLORIDE 0.4 MG/ML
0.4 INJECTION, SOLUTION INTRAMUSCULAR; INTRAVENOUS; SUBCUTANEOUS
Status: DISCONTINUED | OUTPATIENT
Start: 2018-08-31 | End: 2018-08-31 | Stop reason: HOSPADM

## 2018-08-31 RX ORDER — FLUMAZENIL 0.1 MG/ML
0.2 INJECTION INTRAVENOUS
Status: DISCONTINUED | OUTPATIENT
Start: 2018-08-31 | End: 2018-08-31 | Stop reason: HOSPADM

## 2018-08-31 RX ORDER — SODIUM CHLORIDE 9 MG/ML
INJECTION, SOLUTION INTRAVENOUS
Status: DISCONTINUED | OUTPATIENT
Start: 2018-08-31 | End: 2018-08-31 | Stop reason: HOSPADM

## 2018-08-31 RX ORDER — INSULIN GLARGINE 100 [IU]/ML
8 INJECTION, SOLUTION SUBCUTANEOUS DAILY
Status: DISCONTINUED | OUTPATIENT
Start: 2018-08-31 | End: 2018-09-03

## 2018-08-31 RX ORDER — MIDAZOLAM HYDROCHLORIDE 1 MG/ML
.25-1 INJECTION, SOLUTION INTRAMUSCULAR; INTRAVENOUS
Status: DISCONTINUED | OUTPATIENT
Start: 2018-08-31 | End: 2018-08-31 | Stop reason: HOSPADM

## 2018-08-31 RX ORDER — POLYETHYLENE GLYCOL 3350 17 G/17G
17 POWDER, FOR SOLUTION ORAL DAILY
Status: DISCONTINUED | OUTPATIENT
Start: 2018-08-31 | End: 2018-09-04 | Stop reason: HOSPADM

## 2018-08-31 RX ORDER — PROPOFOL 10 MG/ML
INJECTION, EMULSION INTRAVENOUS AS NEEDED
Status: DISCONTINUED | OUTPATIENT
Start: 2018-08-31 | End: 2018-08-31 | Stop reason: HOSPADM

## 2018-08-31 RX ORDER — SODIUM CHLORIDE 0.9 % (FLUSH) 0.9 %
5-10 SYRINGE (ML) INJECTION AS NEEDED
Status: ACTIVE | OUTPATIENT
Start: 2018-08-31 | End: 2018-08-31

## 2018-08-31 RX ADMIN — PROPOFOL 40 MG: 10 INJECTION, EMULSION INTRAVENOUS at 12:01

## 2018-08-31 RX ADMIN — SODIUM CHLORIDE 100 ML/HR: 900 INJECTION, SOLUTION INTRAVENOUS at 13:49

## 2018-08-31 RX ADMIN — PROPOFOL 40 MG: 10 INJECTION, EMULSION INTRAVENOUS at 11:51

## 2018-08-31 RX ADMIN — DORZOLAMIDE HCL 1 DROP: 20 SOLUTION/ DROPS OPHTHALMIC at 21:09

## 2018-08-31 RX ADMIN — DORZOLAMIDE HCL 1 DROP: 20 SOLUTION/ DROPS OPHTHALMIC at 18:22

## 2018-08-31 RX ADMIN — INSULIN LISPRO 2 UNITS: 100 INJECTION, SOLUTION INTRAVENOUS; SUBCUTANEOUS at 06:20

## 2018-08-31 RX ADMIN — MAGNESIUM SULFATE HEPTAHYDRATE 2 G: 40 INJECTION, SOLUTION INTRAVENOUS at 09:28

## 2018-08-31 RX ADMIN — LATANOPROST 1 DROP: 50 SOLUTION OPHTHALMIC at 21:09

## 2018-08-31 RX ADMIN — PROPOFOL 80 MG: 10 INJECTION, EMULSION INTRAVENOUS at 11:44

## 2018-08-31 RX ADMIN — SODIUM CHLORIDE 40 MG: 9 INJECTION INTRAMUSCULAR; INTRAVENOUS; SUBCUTANEOUS at 09:14

## 2018-08-31 RX ADMIN — INSULIN LISPRO 2 UNITS: 100 INJECTION, SOLUTION INTRAVENOUS; SUBCUTANEOUS at 21:49

## 2018-08-31 RX ADMIN — PROPOFOL 40 MG: 10 INJECTION, EMULSION INTRAVENOUS at 12:09

## 2018-08-31 RX ADMIN — PROPOFOL 40 MG: 10 INJECTION, EMULSION INTRAVENOUS at 12:12

## 2018-08-31 RX ADMIN — AZELASTINE HYDROCHLORIDE 2 SPRAY: 137 SPRAY, METERED NASAL at 09:16

## 2018-08-31 RX ADMIN — PROPOFOL 30 MG: 10 INJECTION, EMULSION INTRAVENOUS at 12:15

## 2018-08-31 RX ADMIN — PROPOFOL 40 MG: 10 INJECTION, EMULSION INTRAVENOUS at 11:45

## 2018-08-31 RX ADMIN — PROPOFOL 40 MG: 10 INJECTION, EMULSION INTRAVENOUS at 11:59

## 2018-08-31 RX ADMIN — SODIUM CHLORIDE: 9 INJECTION, SOLUTION INTRAVENOUS at 11:59

## 2018-08-31 RX ADMIN — PROPOFOL 40 MG: 10 INJECTION, EMULSION INTRAVENOUS at 11:47

## 2018-08-31 RX ADMIN — SODIUM CHLORIDE: 9 INJECTION, SOLUTION INTRAVENOUS at 11:44

## 2018-08-31 RX ADMIN — TIMOLOL MALEATE 1 DROP: 5 SOLUTION OPHTHALMIC at 09:15

## 2018-08-31 RX ADMIN — DORZOLAMIDE HCL 1 DROP: 20 SOLUTION/ DROPS OPHTHALMIC at 09:16

## 2018-08-31 RX ADMIN — Medication 10 ML: at 21:07

## 2018-08-31 RX ADMIN — LIDOCAINE HYDROCHLORIDE 40 MG: 20 INJECTION, SOLUTION EPIDURAL; INFILTRATION; INTRACAUDAL; PERINEURAL at 11:44

## 2018-08-31 RX ADMIN — AZELASTINE HYDROCHLORIDE 2 SPRAY: 137 SPRAY, METERED NASAL at 18:22

## 2018-08-31 RX ADMIN — POLYETHYLENE GLYCOL 3350 17 G: 17 POWDER, FOR SOLUTION ORAL at 18:21

## 2018-08-31 RX ADMIN — PROPOFOL 40 MG: 10 INJECTION, EMULSION INTRAVENOUS at 12:05

## 2018-08-31 RX ADMIN — INSULIN GLARGINE 8 UNITS: 100 INJECTION, SOLUTION SUBCUTANEOUS at 09:28

## 2018-08-31 RX ADMIN — PROPOFOL 30 MG: 10 INJECTION, EMULSION INTRAVENOUS at 12:20

## 2018-08-31 RX ADMIN — SODIUM CHLORIDE 40 MG: 9 INJECTION INTRAMUSCULAR; INTRAVENOUS; SUBCUTANEOUS at 21:07

## 2018-08-31 RX ADMIN — INSULIN LISPRO 3 UNITS: 100 INJECTION, SOLUTION INTRAVENOUS; SUBCUTANEOUS at 17:34

## 2018-08-31 RX ADMIN — PROPOFOL 40 MG: 10 INJECTION, EMULSION INTRAVENOUS at 11:56

## 2018-08-31 NOTE — PROGRESS NOTES
Hematology-Oncology Progress Note Gabriel Rosales 1940 
[id number] 
8/31/2018 Follow-up for: pancreatic carcinoma? [x]        Chart notes since last visit reviewed [x]        Medications reviewed for allergies and interactions Case discussed with the following:         []            
               []        Nursing Staff  
                                                                      []        Pathologist 
                                                                      []        FAMILY Subjective:  
 
Spoke with patient who complains of: pt. Is having some abd. discomfort today, no c/o , bleeding, did well with transfusion Objective:  
 
Patient Vitals for the past 24 hrs: 
 BP Temp Pulse Resp SpO2  
08/31/18 0753 97/61 98.2 °F (36.8 °C) 65 16 100 % 08/31/18 0230 129/80 98.3 °F (36.8 °C) 65 16 98 % 08/30/18 2239 122/76 98.7 °F (37.1 °C) 65 16 97 % 08/30/18 1510 116/68 98.3 °F (36.8 °C) 65 16 98 % 08/30/18 1429 108/53 98.8 °F (37.1 °C) 66 16 100 % 08/30/18 1422 103/65 98.4 °F (36.9 °C) 65 16 98 % 08/30/18 1411 120/68 98.3 °F (36.8 °C) 65 16 99 % 08/30/18 1351 129/75 97.7 °F (36.5 °C) 66 16 100 % 08/30/18 0904 131/60 98.6 °F (37 °C) 67 18 98 % REVIEW OF SYSTEMS:   
Constitutional: negative fever, negative chills, negative weight loss Eyes:   negative visual changes ENT:   negative sore throat, tongue or lip swelling Respiratory:  negative cough, negative dyspnea Cards:  negative for chest pain, palpitations, lower extremity edema GI:   negative for nausea, vomiting, diarrhea, and abdominal pain Neuro:  negative for headaches, dizziness, vertigo [x]                        Full ROS o/w normal/non contributor Constitutional:  Patient looks []        Sick []        Frail 
[x]        Better                                                
[]        Depressed HEENT:  [x]   NC                         []   AT               []    ALOPECIA Eyes: [x]   Normal               []    Icteric Oropharynx: [x]    Normal                  []  Thrush               []   Dry Mucositis: [x]    None                 Grade: []        I  []        II  []        III  []        IV Neck:   [x]   Supple                  []  Rigid JVD:    [x]   ABSENT       []   PRESENT Lymphadenopathy:   [x]   None Noted            []   PRESENT Chest: 
[x]   Clear               []    Rhonchi                      Dec'd @     []  Right Base           []   Left Base CV:             [x]   Regular              []  Irregular               []   Tachy                []   Murmur Abdominal:   [x]    Soft              [x]   NON-tender               []   Tender BS:    []   ABSENT                   [x]   PRESENT Liver:     [x]  NON-palp                  []   EDGE- palp Spleen: [x]   NON-palp                   []  EDGE - palp Mass:   [x]   ABSENT                          []  PRESENT Extr:    [x]  Lymphedema             []   Cyanosis      []  Clubbing Edema:     [x]   NONE       []   PRESENT Skin:  Intact [x]           Purpura [] Rash: [x]   ABSENT       []  PRESENT Neuro: 
[x]        Normal 
[]        Confused Available labs reviewed: 
Labs:   
Recent Results (from the past 24 hour(s)) GLUCOSE, POC Collection Time: 08/30/18 11:42 AM  
Result Value Ref Range Glucose (POC) 147 (H) 65 - 100 mg/dL Performed by Shirley Grayson GLUCOSE, POC Collection Time: 08/30/18  4:45 PM  
Result Value Ref Range Glucose (POC) 271 (H) 65 - 100 mg/dL Performed by Shirley Grayson GLUCOSE, POC Collection Time: 08/30/18  9:51 PM  
Result Value Ref Range Glucose (POC) 192 (H) 65 - 100 mg/dL Performed by WHITE MARITO METABOLIC PANEL, COMPREHENSIVE Collection Time: 08/31/18  4:57 AM  
Result Value Ref Range  Sodium 138 136 - 145 mmol/L  
 Potassium 3.6 3.5 - 5.1 mmol/L Chloride 107 97 - 108 mmol/L  
 CO2 21 21 - 32 mmol/L Anion gap 10 5 - 15 mmol/L Glucose 191 (H) 65 - 100 mg/dL BUN 7 6 - 20 MG/DL Creatinine 0.75 0.55 - 1.02 MG/DL  
 BUN/Creatinine ratio 9 (L) 12 - 20 GFR est AA >60 >60 ml/min/1.73m2 GFR est non-AA >60 >60 ml/min/1.73m2 Calcium 8.6 8.5 - 10.1 MG/DL Bilirubin, total 0.7 0.2 - 1.0 MG/DL  
 ALT (SGPT) 125 (H) 12 - 78 U/L  
 AST (SGOT) 38 (H) 15 - 37 U/L Alk. phosphatase 172 (H) 45 - 117 U/L Protein, total 5.9 (L) 6.4 - 8.2 g/dL Albumin 2.8 (L) 3.5 - 5.0 g/dL Globulin 3.1 2.0 - 4.0 g/dL A-G Ratio 0.9 (L) 1.1 - 2.2 MAGNESIUM Collection Time: 08/31/18  4:57 AM  
Result Value Ref Range Magnesium 1.4 (L) 1.6 - 2.4 mg/dL PHOSPHORUS Collection Time: 08/31/18  4:57 AM  
Result Value Ref Range Phosphorus 2.5 (L) 2.6 - 4.7 MG/DL  
CBC W/O DIFF Collection Time: 08/31/18  4:57 AM  
Result Value Ref Range WBC 6.1 3.6 - 11.0 K/uL  
 RBC 2.91 (L) 3.80 - 5.20 M/uL HGB 9.1 (L) 11.5 - 16.0 g/dL HCT 28.2 (L) 35.0 - 47.0 % MCV 96.9 80.0 - 99.0 FL  
 MCH 31.3 26.0 - 34.0 PG  
 MCHC 32.3 30.0 - 36.5 g/dL  
 RDW 18.3 (H) 11.5 - 14.5 % PLATELET 135 146 - 647 K/uL MPV 10.1 8.9 - 12.9 FL  
 NRBC 0.3 (H) 0  WBC ABSOLUTE NRBC 0.02 (H) 0.00 - 0.01 K/uL GLUCOSE, POC Collection Time: 08/31/18  5:36 AM  
Result Value Ref Range Glucose (POC) 191 (H) 65 - 100 mg/dL Performed by WHITE MARITO Available Xrays reviewed: 
 
Chemotherapy monitored and toxicities assessed: 
 
Assessment and Plan 1. ?pancreatic carcinoma. .. She is s/p egd and biopsy 8/28 the path is negative   cea and ca19;9 are normal.. Jonelle Gearing Jonelle Gearing She is not sure if she wants to get treated , her fund of knowledge is compromised and makes treatment discussions challenging, plans for eus bx in the works, I discussed with her daughter mrs. Fraga yesterday and she understands that without path we cant make any reasonable recommendations. .. She also was informed that today's procedure may not give us an answer as well. 2. Anemia. . Secondary to presumed gi bleeding, hgb is up to  9/1 today. after one unit on 8/30 3. Deconditioning. .. Physical therapy to see Jimmy Crowell MD

## 2018-08-31 NOTE — PROGRESS NOTES
[facility] 
[address] [facility], [address] GI PROGRESS NOTE To [place], [address] office [phone] NP in-hospital cell phone M-F until 4:30 After 5pm or on weekends, please call  for physician on call NAME: Eloise Jackson :  1940 MRN:  [id number] Subjective: She denies GI complaint. Objective: VITALS:  
Last 24hrs VS reviewed since prior progress note. Most recent are: 
Visit Vitals  BP 97/61 (BP 1 Location: Left arm, BP Patient Position: At rest)  Pulse 65  Temp 98.2 °F (36.8 °C)  Resp 16  
 Ht 5' 3\" (1.6 m)  Wt 78.2 kg (172 lb 8 oz)  SpO2 100%  BMI 30.56 kg/m2 PHYSICAL EXAM: 
General: Cooperative, no acute distress   
Neurologic:  Alert and oriented X 3. HEENT: EOMI, no scleral icterus Lungs:  CTA bilaterally. No wheezing Heart:  S1 S2, regular rhythm, no murmur Abdomen: Soft, non-distended, generalized tenderness. +Bowel sounds Extremities: No edema Psych:   Poor insight. Not anxious or agitated. Lab Data Reviewed:  
 
Recent Results (from the past 24 hour(s)) GLUCOSE, POC Collection Time: 18 11:42 AM  
Result Value Ref Range Glucose (POC) 147 (H) 65 - 100 mg/dL Performed by Carter Colon GLUCOSE, POC Collection Time: 18  4:45 PM  
Result Value Ref Range Glucose (POC) 271 (H) 65 - 100 mg/dL Performed by [employer] Colon GLUCOSE, POC Collection Time: 18  9:51 PM  
Result Value Ref Range Glucose (POC) 192 (H) 65 - 100 mg/dL Performed by WHITE MARITO METABOLIC PANEL, COMPREHENSIVE Collection Time: 18  4:57 AM  
Result Value Ref Range Sodium 138 136 - 145 mmol/L Potassium 3.6 3.5 - 5.1 mmol/L Chloride 107 97 - 108 mmol/L  
 CO2 21 21 - 32 mmol/L Anion gap 10 5 - 15 mmol/L Glucose 191 (H) 65 - 100 mg/dL BUN 7 6 - 20 MG/DL  Creatinine 0.75 0.55 - 1.02 MG/DL  
 BUN/Creatinine ratio 9 (L) 12 - 20    
 GFR est AA >60 >60 ml/min/1.73m2 GFR est non-AA >60 >60 ml/min/1.73m2 Calcium 8.6 8.5 - 10.1 MG/DL Bilirubin, total 0.7 0.2 - 1.0 MG/DL  
 ALT (SGPT) 125 (H) 12 - 78 U/L  
 AST (SGOT) 38 (H) 15 - 37 U/L Alk. phosphatase 172 (H) 45 - 117 U/L Protein, total 5.9 (L) 6.4 - 8.2 g/dL Albumin 2.8 (L) 3.5 - 5.0 g/dL Globulin 3.1 2.0 - 4.0 g/dL A-G Ratio 0.9 (L) 1.1 - 2.2 MAGNESIUM Collection Time: 08/31/18  4:57 AM  
Result Value Ref Range Magnesium 1.4 (L) 1.6 - 2.4 mg/dL PHOSPHORUS Collection Time: 08/31/18  4:57 AM  
Result Value Ref Range Phosphorus 2.5 (L) 2.6 - 4.7 MG/DL  
CBC W/O DIFF Collection Time: 08/31/18  4:57 AM  
Result Value Ref Range WBC 6.1 3.6 - 11.0 K/uL  
 RBC 2.91 (L) 3.80 - 5.20 M/uL HGB 9.1 (L) 11.5 - 16.0 g/dL HCT 28.2 (L) 35.0 - 47.0 % MCV 96.9 80.0 - 99.0 FL  
 MCH 31.3 26.0 - 34.0 PG  
 MCHC 32.3 30.0 - 36.5 g/dL  
 RDW 18.3 (H) 11.5 - 14.5 % PLATELET 299 463 - 154 K/uL MPV 10.1 8.9 - 12.9 FL  
 NRBC 0.3 (H) 0  WBC ABSOLUTE NRBC 0.02 (H) 0.00 - 0.01 K/uL GLUCOSE, POC Collection Time: 08/31/18  5:36 AM  
Result Value Ref Range Glucose (POC) 191 (H) 65 - 100 mg/dL Performed by WHITE MARITO Assessment:  
· GI bleed: anemia with hemoccult positive stool. Hgb 9.1 Status post 3 units PRBCs; EGD 8/28 duodenal ulcer with prominent duodenal folds with marked narrowing,unable to pass scope, concern that pancreatic head lesion is infiltrating duodenum; biopsy non diagnostic, normal small bowel · Abdominal pain: WBC 6.1. CT abdomen/pelvis without contrast (8/27/18): mass lesion within the head of the pancreas with atrophy of the body and tail highly concerning for adenocarcinoma · Suspected metastatic pancreatic adenocarcinoma: CT abdomen/pelvis without contrast (8/27/18) · Elevated LFTs: improving AST: 38, ALT: 125, alkaline phosphatase: 172, total bilirubin: normal.  
 
Patient Active Problem List  
 Diagnosis Code  Glaucoma H40.9  GERD (gastroesophageal reflux disease) K21.9  
 Uncontrolled type 2 diabetes mellitus with diabetic neuropathy, with long-term current use of insulin (HCC) E11.40, Z79.4, E11.65  
 Essential hypertension, benign I10  
 Hyperlipidemia E78.5  Anxiety F41.9  Osteoarthritis M19.90  Cardiac pacemaker in situ Z95.0  Diabetes ([facility] [address] E11.9  Acute upper GI bleed K92.2 Plan:  
· NPO  
· Plan for EUS today with Dr. Nelda Herrera - discussed with Miracle Orozco again, consent on chart · Continue PPI 
· Oncology following · Continue supportive measures Signed By: Isaac Langford NP   
 8/31/2018  1:24 PM 
  
 
I have examined the patient. I have reviewed the chart and agree with the documentation recorded by the NP, including the assessment, treatment plan, and disposition.  
 
 
 
Hortensia Contreras MD

## 2018-08-31 NOTE — PROCEDURES
[address]  [address] [facility], [address] [place] [address]       Endoscopic Ultrasound    NAME:  Suzie Russell   :   1940   MRN:   [id number]       Procedure Type: Endoscopic Ultrasound    Indications: Abnormal CT scan showing mass in pancreas    Pre-operative Diagnosis: see indication above    Post-operative Diagnosis:  See findings below    : Gregorio Mabry MD    Referring Provider: Cathryn Mercer MD    Procedure Details:      Anethesia/Sedation:  MAC anesthesia Propofol      Procedure Details   After infom consent was obtained for the procedure, with all risks and benefits of procedure explained the patient was taken to the endoscopy suite and placed in the left lateral decubitus position. Following sequential administration of sedation as per above, the EGD then linear echoendoscope was inserted into the mouth and advanced under direct vision to third portion of the duodenum. A careful inspection was made as the gastroscope was withdrawn, including a retroflexed view of the proximal stomach; findings and interventions are described below.    Findings:     Endoscopic:   Esophagus:normal   Stomach: normal    Duodenum/jejunum: there was duodenal obstruction in second portion, from extrinsic compression, able to pass the adult EGD scope into the third portion but not the linear EUS scope, unable to see the ampulla because of that      Ultrasound:   Esophagus: normal findings   Stomach: normal findings   Pancreas:     Areas examined: the entire gland    Parenchyma: -tumor/mass, located in  the head, 2x3 cm in size , hypoechoic , abutting the portal vein, and invadiNg the distal CBD, limited exam since I was unable to fully advance the scope into the duodenum , then FNB ( fine needle biopsy was done) with 22 gauge needle ([employer], [employer]), multiple passes done, preliminary reading showed malignant cells    Pancreatic Duct: dilated distally to the mass   Liver: Parenchyma: normal        Bile Duct: the entire biliary tree, DILATED UP TO 11 MM               Lymph Node: no adenopathy         Specimen Removed:  AS ABOVE    Complications: None. EBL:  None.     Interventions: AS ABOVE      Recommendations:  liquid diet  Discussed results with Dr. Gifty Cuellar and pt's family  Consider gastric/duodenal stenting by me next week if patient and family are agreeable and patient unable to eat  Consider PTC by IR in case patient becomes jaundiced since her biliary system is not accessible by ERCP due to duodenal obstruction  Will follow    Signed By: Jose Juan Bar MD     8/31/2018  12:34 PM

## 2018-08-31 NOTE — PROGRESS NOTES

## 2018-08-31 NOTE — PROGRESS NOTES
Hospitalist Progress Note Glenn Duran MD 
Answering service: [phone] OR [phone] from in house phone Date of Service:  2018 NAME:  Lauren Dowell :  1940 MRN:  [id number] Admission Summary:  
[de-identified] yo woman with DM2, dyslipidemia, CAD s/p CABG, SSS s/p PPM presented to the ED from home on 18 with worsening abdominal pain, nausea, vomiting. CT a/p in the ED showed findings worrisome for metastatic pancreatic adenocarcinoma. She was admitted with significant anemia and heme-positive stool likely due to UGIB. Interval history / Subjective:  
C/o left upper abdo pain, \"wheezing\" all night and this morning; NPO for EUS today; d/w nurse, no overnight events Assessment & Plan:  
 
Acute blood loss anemia (POA) - s/p 3 units  PRBC since admission 
- likely due to metastatic pancreatic CA exacerbated by GI blood loss  
- iron, B12, folate WNL 
   
GI bleed (POA) - FOBT + 
- IV PPI BID 
- pt denies hematochezia/melena - GI consulted - s/p EGD ; pathology benign - CA 19-9 and CEA WNL 
- hold ASA and apixaban 
- EUS today 
   
Prerenal azotemia (POA) - likely due to GI bleed 
- continue IVF and BT 
- hold home HCTZ 
   
DM2 with hyperglycemia - last A1c 7.8 (2018), SSI 
- hold home metformin, linaclotide, pioglitazone, glipizide 
- resume meds as appropriate 
- resume home insulin at appropriate dose 
   
Elevated LFTs - RUQ US  Pancreatic head mass and biliary dilation. Trace gallbladder sludge without stones. Uniform liver. - trending down 
   
Metastatic pancreatic CA - Oncology following -  pathology benign - CA 19-9 and CEA WNL 
- for EUS today or tomorrow 
   
Multiple b/l lung nodules (POA) - seen on CXR 
- CTA chest  Multiple pulmonary nodules, likely metastases. 
   
SSS s/p PPM - hold apixaban and ASA for now 
   
DLD - hold home statin due to transaminitis 
   
 HTN - controlled on home meds; hold home HCTZ while on IVF 
   
CAD s/p CABG - hold ASA (due to GIB) and statin (due to transaminitis) 
- resumed home ARB 
- no BB on PTA med list 
  
Mild hypokalemia - replete prn Hypomagnesemia - replete prn Reported \"wheezing\" - nebs prn although none heard on exam and pt has not reported such to nursing staff 
   
Code status: Full DVT prophylaxis:SCDs; resume apixaban when appropriate Care Plan discussed with: Patient/Family, Nurse,  and Consultant GI. Long d/w daughter Ms Talha Bhardwaj 8/30 during bedside rounds. Disposition: TBD. Likely dc to [facility] when medically stable for discharge. Poor prognosis and pt with limited insight. Hospital Problems  Date Reviewed: 8/27/2018 Codes Class Noted POA * (Principal)Acute upper GI bleed ICD-10-CM: K92.2 ICD-9-CM: 578.9  8/27/2018 Yes Review of Systems:  
Pertinent items are noted in HPI. Vital Signs:  
 Last 24hrs VS reviewed since prior progress note. Most recent are: 
Visit Vitals  BP 97/61 (BP 1 Location: Left arm, BP Patient Position: At rest)  Pulse 65  Temp 98.2 °F (36.8 °C)  Resp 16  
 Ht 5' 3\" (1.6 m)  Wt 78.2 kg (172 lb 8 oz)  SpO2 100%  BMI 30.56 kg/m2 Intake/Output Summary (Last 24 hours) at 08/31/18 1057 Last data filed at 08/30/18 1758 Gross per 24 hour Intake           1177.5 ml Output              250 ml Net            927.5 ml Physical Examination:  
 
Gen: awake, NAD, obese CVS: regular rhythm, normal rate, no m/r/g appreciated, no peripheral edema Lungs: CTAB anteriorly, no w/r/r Abd: +BS, soft, ND, NT 
MSK: NICOLAS Neuro: awake, alert, responds appropriately to questions and commands but very talkative Data Review:  
 Review and/or order of clinical lab test 
Review and/or order of tests in the radiology section of CPT Review and/or order of tests in the medicine section of CPT Labs:  
 
Recent Labs 08/31/18 3576  08/30/18 
 9528 WBC  6.1  7.0 HGB  9.1*  6.9*  
HCT  28.2*  21.5*  
PLT  152  126* Recent Labs 08/31/18 
 0457 NA  138  
K  3.6 CL  107 CO2  21 BUN  7  
CREA  0.75 GLU  191* CA  8.6 MG  1.4* PHOS  2.5* Recent Labs 08/31/18 
 0457 SGOT  38* ALT  125* AP  172* TBILI  0.7 TP  5.9* ALB  2.8*  
GLOB  3.1 No results for input(s): INR, PTP, APTT in the last 72 hours. No lab exists for component: INREXT, INREXT No results for input(s): FE, TIBC, PSAT, FERR in the last 72 hours. Lab Results Component Value Date/Time Folate 25.3 (H) 08/27/2018 03:42 AM  
  
No results for input(s): PH, PCO2, PO2 in the last 72 hours. No results for input(s): CPK, CKNDX, TROIQ in the last 72 hours. No lab exists for component: CPKMB Lab Results Component Value Date/Time Cholesterol, total 82 08/28/2018 04:48 AM  
 HDL Cholesterol 38 08/28/2018 04:48 AM  
 LDL, calculated 27.6 08/28/2018 04:48 AM  
 Triglyceride 82 08/28/2018 04:48 AM  
 CHOL/HDL Ratio 2.2 08/28/2018 04:48 AM  
 
Lab Results Component Value Date/Time Glucose (POC) 191 (H) 08/31/2018 05:36 AM  
 Glucose (POC) 192 (H) 08/30/2018 09:51 PM  
 Glucose (POC) 271 (H) 08/30/2018 04:45 PM  
 Glucose (POC) 147 (H) 08/30/2018 11:42 AM  
 Glucose (POC) 168 (H) 08/30/2018 06:19 AM  
 
Lab Results Component Value Date/Time  Color YELLOW/STRAW 08/27/2018 04:37 AM  
 Appearance CLEAR 08/27/2018 04:37 AM  
 Specific gravity 1.019 08/27/2018 04:37 AM  
 pH (UA) 5.0 08/27/2018 04:37 AM  
 Protein NEGATIVE  08/27/2018 04:37 AM  
 Glucose NEGATIVE  08/27/2018 04:37 AM  
 Ketone TRACE (A) 08/27/2018 04:37 AM  
 Bilirubin NEGATIVE  08/27/2018 04:37 AM  
 Urobilinogen 0.2 08/27/2018 04:37 AM  
 Nitrites NEGATIVE  08/27/2018 04:37 AM  
 Leukocyte Esterase NEGATIVE  08/27/2018 04:37 AM  
 Epithelial cells FEW 08/27/2018 04:37 AM  
 Bacteria NEGATIVE  08/27/2018 04:37 AM  
 WBC 0-4 08/27/2018 04:37 AM  
 RBC 0-5 08/27/2018 04:37 AM  
 
Medications Reviewed:  
 
Current Facility-Administered Medications Medication Dose Route Frequency  insulin glargine (LANTUS) injection 8 Units  8 Units SubCUTAneous DAILY  albuterol-ipratropium (DUO-NEB) 2.5 MG-0.5 MG/3 ML  3 mL Nebulization Q6H PRN  
 0.9% sodium chloride infusion 250 mL  250 mL IntraVENous PRN  
 acetaminophen (TYLENOL) tablet 650 mg  650 mg Oral Q4H PRN  
 0.9% sodium chloride infusion 250 mL  250 mL IntraVENous PRN  
 0.9% sodium chloride infusion  125 mL/hr IntraVENous CONTINUOUS  
 pantoprazole (PROTONIX) 40 mg in sodium chloride 0.9% 10 mL injection  40 mg IntraVENous Q12H  
 azelastine (ASTELIN) 137mcg/spray nasal spray  2 Spray Both Nostrils BID  dorzolamide (TRUSOPT) 2 % ophthalmic solution 1 Drop  1 Drop Both Eyes TID  diazePAM (VALIUM) tablet 2 mg  2 mg Oral QHS PRN  
 ferrous sulfate tablet 325 mg  1 Tab Oral ACB  latanoprost (XALATAN) 0.005 % ophthalmic solution 1 Drop  1 Drop Both Eyes QHS  losartan (COZAAR) tablet 50 mg  50 mg Oral DAILY  timolol (TIMOPTIC) 0.5 % ophthalmic solution 1 Drop  1 Drop Both Eyes DAILY  sodium chloride (NS) flush 5-10 mL  5-10 mL IntraVENous Q8H  
 sodium chloride (NS) flush 5-10 mL  5-10 mL IntraVENous PRN  
 fentaNYL citrate (PF) injection 25 mcg  25 mcg IntraVENous Q4H PRN  
 ondansetron (ZOFRAN) injection 4 mg  4 mg IntraVENous Q4H PRN  
 insulin lispro (HUMALOG) injection   SubCUTAneous AC&HS  
 glucose chewable tablet 16 g  4 Tab Oral PRN  
 dextrose (D50W) injection syrg 12.5-25 g  12.5-25 g IntraVENous PRN  
 glucagon (GLUCAGEN) injection 1 mg  1 mg IntraMUSCular PRN  
 
______________________________________________________________________ EXPECTED LENGTH OF STAY: 3d 2h 
ACTUAL LENGTH OF STAY:          4 Gus Avalos MD

## 2018-08-31 NOTE — PROGRESS NOTES
Peggy Opoka 1940 
[id number] Situation: 
Verbal report received from: [facility] Procedure: Procedure(s): ENDOSCOPIC ULTRASOUND (EUS) ESOPHAGOGASTRODUODENOSCOPY (EGD) FINE NEEDLE ASPIRATION Background: 
 
Preoperative diagnosis: Pancreatic Mass Postoperative diagnosis: 1. Duodenal Obstruction 2. Pancreas Head Mass :  Dr. Liliya Shabazz Assistant(s): Endoscopy Technician-1: Antonieta Cooper Endoscopy RN-1: Ruth Ayala RN Specimens: * No specimens in log * H. Pylori  no Assessment: 
Intra-procedure medications Anesthesia gave intra-procedure sedation and medications, see anesthesia flow sheet yes Intravenous fluids: NS@ Ellene Ghazi Vital signs stable suctioned for thick white sputum  Placed on Nasal oxygen at @ liters Abdominal assessment: round and soft Recommendation: 
Discharge patient per MD order* Return to floor

## 2018-08-31 NOTE — ANESTHESIA POSTPROCEDURE EVALUATION
Post-Anesthesia Evaluation and Assessment Patient: Andrés Herr MRN: [id number]  SSN: [id number] YOB: 1940  Age: [de-identified] y.o. Sex: female Cardiovascular Function/Vital Signs Visit Vitals  /80  Pulse 65  Temp 37.1 °C (98.7 °F)  Resp (!) 34  
 Ht 5' 3\" (1.6 m)  Wt 78.2 kg (172 lb 8 oz)  SpO2 96%  BMI 30.56 kg/m2 Patient is status post MAC anesthesia for Procedure(s): ENDOSCOPIC ULTRASOUND (EUS) ESOPHAGOGASTRODUODENOSCOPY (EGD) FINE NEEDLE ASPIRATION. Nausea/Vomiting: None Postoperative hydration reviewed and adequate. Pain: 
Pain Scale 1: Numeric (0 - 10) (08/31/18 1254) Pain Intensity 1: 0 (08/31/18 1254) Managed Neurological Status:  
Neuro Neurologic State: Alert; Appropriate for age (08/31/18 0800) Orientation Level: Appropriate for age (08/31/18 0800) Cognition: Appropriate decision making (08/31/18 0800) Speech: Appropriate for age (08/30/18 0800) LUE Motor Response: Purposeful (08/31/18 0800) LLE Motor Response: Weak (08/31/18 0800) RUE Motor Response: Purposeful (08/31/18 0800) RLE Motor Response: Weak (08/31/18 0800) At baseline Mental Status and Level of Consciousness: Arousable Pulmonary Status:  
O2 Device: Room air (08/31/18 1303) Adequate oxygenation and airway patent Complications related to anesthesia: None Post-anesthesia assessment completed. No concerns Signed By: Fatuma Askew MD   
 August 31, 2018

## 2018-08-31 NOTE — ANESTHESIA PREPROCEDURE EVALUATION
Anesthetic History No history of anesthetic complications Review of Systems / Medical History Patient summary reviewed, nursing notes reviewed and pertinent labs reviewed Pulmonary Within defined limits Neuro/Psych Within defined limits Cardiovascular Hypertension: well controlled Pacemaker and hyperlipidemia GI/Hepatic/Renal 
  
GERD 
 
 
PUD Endo/Other Diabetes: well controlled, using insulin Arthritis Other Findings Physical Exam 
 
Airway Mallampati: III 
TM Distance: > 6 cm Neck ROM: normal range of motion Mouth opening: Normal 
 
 Cardiovascular Regular rate and rhythm,  S1 and S2 normal,  no murmur, click, rub, or gallop Dental 
 
Dentition: Full upper dentures Pulmonary Breath sounds clear to auscultation Abdominal 
GI exam deferred Other Findings Anesthetic Plan ASA: 3 Anesthesia type: MAC Induction: Intravenous Anesthetic plan and risks discussed with: Son / Daughter and Patient

## 2018-08-31 NOTE — ROUTINE PROCESS
Bedside shift change report given to rashaun salter rn (oncoming nurse) by Brook Gilford (offgoing nurse). Report included the following information SBAR and Kardex.

## 2018-08-31 NOTE — PROGRESS NOTES
Altagracia Sweeney 1940 
[id number] Situation: 
 
Scheduled Procedure: Procedure(s): ENDOSCOPIC ULTRASOUND (EUS) ESOPHAGOGASTRODUODENOSCOPY (EGD) Verbal report received from: Reyes Martinez, EITAN 
Preoperative diagnosis: Pancreatic Mass Background: 
 
Procedure: Procedure(s): ENDOSCOPIC ULTRASOUND (EUS) ESOPHAGOGASTRODUODENOSCOPY (EGD) Physician performing procedure; Dr. Shoaib Arcos SBAR QUESTIONS FLOOR TO ENDO RN 
 
NPO Status/Last PO Intake: before midnight Is the patient taking Blood Thinners: ASA & Eliquis PTA Is the patient diabetic: yes, 145 @ 1110 Does the patient have a Pacemaker/Defibrillator in place?: pacemaker Does the patient need antibiotics before/during/after procedure: no 
Does the patient have SCD in place: no Is patient on CONTACT precautions: no 
 
Assessment: 
Are the vital signs stable prior to patient coming to ENDO? yes Is the patient alert/oriented and able to sign consent for the procedures: consent done with Daughter Cathay Opitz How does the patient's abdomen feel prior to coming to ENDO? round and soft Does the patient have a patient IV in place? yes Recommendation: 
Family or Friend present: yes Permission to share finding with Family or Friend: yes

## 2018-09-01 ENCOUNTER — APPOINTMENT (OUTPATIENT)
Dept: GENERAL RADIOLOGY | Age: 78
DRG: 435 | End: 2018-09-01
Attending: INTERNAL MEDICINE
Payer: MEDICARE

## 2018-09-01 LAB
ALBUMIN SERPL-MCNC: 2.7 G/DL (ref 3.5–5)
ALBUMIN/GLOB SERPL: 0.9 {RATIO} (ref 1.1–2.2)
ALP SERPL-CCNC: 189 U/L (ref 45–117)
ALT SERPL-CCNC: 99 U/L (ref 12–78)
ANION GAP SERPL CALC-SCNC: 7 MMOL/L (ref 5–15)
AST SERPL-CCNC: 37 U/L (ref 15–37)
BILIRUB SERPL-MCNC: 0.5 MG/DL (ref 0.2–1)
BUN SERPL-MCNC: 7 MG/DL (ref 6–20)
BUN/CREAT SERPL: 11 (ref 12–20)
CALCIUM SERPL-MCNC: 7.9 MG/DL (ref 8.5–10.1)
CHLORIDE SERPL-SCNC: 110 MMOL/L (ref 97–108)
CO2 SERPL-SCNC: 23 MMOL/L (ref 21–32)
CREAT SERPL-MCNC: 0.63 MG/DL (ref 0.55–1.02)
ERYTHROCYTE [DISTWIDTH] IN BLOOD BY AUTOMATED COUNT: 18 % (ref 11.5–14.5)
GLOBULIN SER CALC-MCNC: 2.9 G/DL (ref 2–4)
GLUCOSE BLD STRIP.AUTO-MCNC: 115 MG/DL (ref 65–100)
GLUCOSE BLD STRIP.AUTO-MCNC: 152 MG/DL (ref 65–100)
GLUCOSE BLD STRIP.AUTO-MCNC: 153 MG/DL (ref 65–100)
GLUCOSE BLD STRIP.AUTO-MCNC: 230 MG/DL (ref 65–100)
GLUCOSE SERPL-MCNC: 148 MG/DL (ref 65–100)
HCT VFR BLD AUTO: 25.1 % (ref 35–47)
HGB BLD-MCNC: 8.2 G/DL (ref 11.5–16)
MCH RBC QN AUTO: 31.5 PG (ref 26–34)
MCHC RBC AUTO-ENTMCNC: 32.7 G/DL (ref 30–36.5)
MCV RBC AUTO: 96.5 FL (ref 80–99)
NRBC # BLD: 0 K/UL (ref 0–0.01)
NRBC BLD-RTO: 0 PER 100 WBC
PLATELET # BLD AUTO: 130 K/UL (ref 150–400)
PMV BLD AUTO: 10.5 FL (ref 8.9–12.9)
POTASSIUM SERPL-SCNC: 3.5 MMOL/L (ref 3.5–5.1)
PROT SERPL-MCNC: 5.6 G/DL (ref 6.4–8.2)
RBC # BLD AUTO: 2.6 M/UL (ref 3.8–5.2)
SERVICE CMNT-IMP: ABNORMAL
SODIUM SERPL-SCNC: 140 MMOL/L (ref 136–145)
WBC # BLD AUTO: 4.6 K/UL (ref 3.6–11)

## 2018-09-01 PROCEDURE — 74011636637 HC RX REV CODE- 636/637: Performed by: INTERNAL MEDICINE

## 2018-09-01 PROCEDURE — 85027 COMPLETE CBC AUTOMATED: CPT | Performed by: INTERNAL MEDICINE

## 2018-09-01 PROCEDURE — 74011000250 HC RX REV CODE- 250: Performed by: INTERNAL MEDICINE

## 2018-09-01 PROCEDURE — 82962 GLUCOSE BLOOD TEST: CPT

## 2018-09-01 PROCEDURE — 80053 COMPREHEN METABOLIC PANEL: CPT | Performed by: INTERNAL MEDICINE

## 2018-09-01 PROCEDURE — 94640 AIRWAY INHALATION TREATMENT: CPT

## 2018-09-01 PROCEDURE — 74011250636 HC RX REV CODE- 250/636: Performed by: INTERNAL MEDICINE

## 2018-09-01 PROCEDURE — C9113 INJ PANTOPRAZOLE SODIUM, VIA: HCPCS | Performed by: INTERNAL MEDICINE

## 2018-09-01 PROCEDURE — 65660000000 HC RM CCU STEPDOWN

## 2018-09-01 PROCEDURE — 36415 COLL VENOUS BLD VENIPUNCTURE: CPT | Performed by: INTERNAL MEDICINE

## 2018-09-01 PROCEDURE — 71101 X-RAY EXAM UNILAT RIBS/CHEST: CPT

## 2018-09-01 PROCEDURE — 93971 EXTREMITY STUDY: CPT

## 2018-09-01 PROCEDURE — 74011250637 HC RX REV CODE- 250/637: Performed by: INTERNAL MEDICINE

## 2018-09-01 PROCEDURE — 77030029684 HC NEB SM VOL KT MONA -A

## 2018-09-01 RX ADMIN — LATANOPROST 1 DROP: 50 SOLUTION OPHTHALMIC at 21:23

## 2018-09-01 RX ADMIN — Medication 10 ML: at 21:06

## 2018-09-01 RX ADMIN — AZELASTINE HYDROCHLORIDE 2 SPRAY: 137 SPRAY, METERED NASAL at 08:58

## 2018-09-01 RX ADMIN — INSULIN LISPRO 2 UNITS: 100 INJECTION, SOLUTION INTRAVENOUS; SUBCUTANEOUS at 07:20

## 2018-09-01 RX ADMIN — SODIUM CHLORIDE 40 MG: 9 INJECTION INTRAMUSCULAR; INTRAVENOUS; SUBCUTANEOUS at 21:05

## 2018-09-01 RX ADMIN — POLYETHYLENE GLYCOL 3350 17 G: 17 POWDER, FOR SOLUTION ORAL at 08:56

## 2018-09-01 RX ADMIN — LOSARTAN POTASSIUM 50 MG: 50 TABLET ORAL at 08:56

## 2018-09-01 RX ADMIN — AZELASTINE HYDROCHLORIDE 2 SPRAY: 137 SPRAY, METERED NASAL at 17:39

## 2018-09-01 RX ADMIN — IPRATROPIUM BROMIDE AND ALBUTEROL SULFATE 3 ML: .5; 3 SOLUTION RESPIRATORY (INHALATION) at 17:44

## 2018-09-01 RX ADMIN — DORZOLAMIDE HCL 1 DROP: 20 SOLUTION/ DROPS OPHTHALMIC at 16:00

## 2018-09-01 RX ADMIN — SODIUM CHLORIDE 40 MG: 9 INJECTION INTRAMUSCULAR; INTRAVENOUS; SUBCUTANEOUS at 08:56

## 2018-09-01 RX ADMIN — FERROUS SULFATE TAB 325 MG (65 MG ELEMENTAL FE) 325 MG: 325 (65 FE) TAB at 07:10

## 2018-09-01 RX ADMIN — Medication 10 ML: at 13:42

## 2018-09-01 RX ADMIN — SODIUM CHLORIDE 125 ML/HR: 900 INJECTION, SOLUTION INTRAVENOUS at 11:35

## 2018-09-01 RX ADMIN — INSULIN LISPRO 2 UNITS: 100 INJECTION, SOLUTION INTRAVENOUS; SUBCUTANEOUS at 17:38

## 2018-09-01 RX ADMIN — INSULIN LISPRO 3 UNITS: 100 INJECTION, SOLUTION INTRAVENOUS; SUBCUTANEOUS at 11:32

## 2018-09-01 RX ADMIN — Medication 10 ML: at 07:10

## 2018-09-01 RX ADMIN — ONDANSETRON 4 MG: 2 INJECTION INTRAMUSCULAR; INTRAVENOUS at 13:42

## 2018-09-01 RX ADMIN — TIMOLOL MALEATE 1 DROP: 5 SOLUTION OPHTHALMIC at 08:57

## 2018-09-01 RX ADMIN — Medication 10 ML: at 08:56

## 2018-09-01 RX ADMIN — INSULIN GLARGINE 8 UNITS: 100 INJECTION, SOLUTION SUBCUTANEOUS at 08:56

## 2018-09-01 RX ADMIN — DORZOLAMIDE HCL 1 DROP: 20 SOLUTION/ DROPS OPHTHALMIC at 08:58

## 2018-09-01 RX ADMIN — DORZOLAMIDE HCL 1 DROP: 20 SOLUTION/ DROPS OPHTHALMIC at 21:23

## 2018-09-01 NOTE — PROGRESS NOTES
Hematology-Oncology Progress Note Barb Garcia 1940 
[id number] 
9/1/2018 Follow-up for: pancreatic carcinoma? [x]        Chart notes since last visit reviewed [x]        Medications reviewed for allergies and interactions Case discussed with the following:         []            
               []        Nursing Staff  
                                                                      []        Pathologist 
                                                                      []        FAMILY Subjective:  
 
Spoke with patient who complains of: feels better after transfusion No new problems Objective:  
 
Patient Vitals for the past 24 hrs: 
 BP Temp Pulse Resp SpO2  
09/01/18 0753 131/72 98.8 °F (37.1 °C) 67 16 98 % 09/01/18 0232 136/74 98.6 °F (37 °C) 65 16 97 % 08/31/18 2100 129/75 98.5 °F (36.9 °C) 65 16 -  
08/31/18 1359 137/71 98.2 °F (36.8 °C) 65 16 100 % 08/31/18 1349 156/77 97.7 °F (36.5 °C) 65 16 100 % 08/31/18 1310 131/64 - 61 (!) 33 98 % 08/31/18 1303 149/80 - 65 (!) 34 96 % 08/31/18 1258 145/78 - 65 (!) 32 100 % 08/31/18 1254 147/81 - 65 29 100 % 08/31/18 1248 103/50 - 67 30 100 % 08/31/18 1243 120/66 98.7 °F (37.1 °C) 65 (!) 33 96 % 08/31/18 1238 113/69 - 65 (!) 34 (!) 87 % 08/31/18 1234 103/60 - 65 23 -  
08/31/18 1227 94/56 - 65 16 100 % REVIEW OF SYSTEMS:   
Constitutional: negative fever, negative chills, negative weight loss Eyes:   negative visual changes ENT:   negative sore throat, tongue or lip swelling Respiratory:  negative cough, negative dyspnea Cards:  negative for chest pain, palpitations, lower extremity edema GI:   negative for nausea, vomiting, diarrhea, and abdominal pain Neuro:  negative for headaches, dizziness, vertigo [x]                        Full ROS o/w normal/non contributor Constitutional:  Patient looks []        Sick []        Frail 
[x]        Better                                                
[]        Depressed HEENT:  [x]   NC                         []   AT               []    ALOPECIA Eyes: []   Normal               []    Icteric Oropharynx: []    Normal                  []  Thrush               []   Dry Mucositis: []    None                 Grade: []        I  []        II  []        III  []        IV Neck:   [x]   Supple                  []  Rigid JVD:    [x]   ABSENT       []   PRESENT Lymphadenopathy:   [x]   None Noted            []   PRESENT Chest: 
[x]   Clear               []    Rhonchi                      Dec'd @     []  Right Base           []   Left Base CV:             [x]   Regular              []  Irregular               []   Tachy                []   Murmur Abdominal:   [x]    Soft              [x]   NON-tender               []   Tender BS:    []   ABSENT                   [x]   PRESENT Liver:     []  NON-palp                  []   EDGE- palp Spleen: []   NON-palp                   []  EDGE - palp Mass:   []   ABSENT                          []  PRESENT Extr:    []  Lymphedema             []   Cyanosis      []  Clubbing Edema:     [x]   NONE       []   PRESENT Skin:  Intact [x]           Purpura [] Rash: [x]   ABSENT       []  PRESENT Neuro: 
[x]        Normal 
[]        Confused Available labs reviewed: 
Labs:   
Recent Results (from the past 24 hour(s)) GLUCOSE, POC Collection Time: 08/31/18  4:45 PM  
Result Value Ref Range Glucose (POC) 221 (H) 65 - 100 mg/dL Performed by Jeanette De La Cruz GLUCOSE, POC Collection Time: 08/31/18  9:42 PM  
Result Value Ref Range Glucose (POC) 216 (H) 65 - 100 mg/dL Performed by Le Webb METABOLIC PANEL, COMPREHENSIVE Collection Time: 09/01/18  3:39 AM  
Result Value Ref Range Sodium 140 136 - 145 mmol/L Potassium 3.5 3.5 - 5.1 mmol/L  Chloride 110 (H) 97 - 108 mmol/L  
 CO2 23 21 - 32 mmol/L Anion gap 7 5 - 15 mmol/L Glucose 148 (H) 65 - 100 mg/dL BUN 7 6 - 20 MG/DL Creatinine 0.63 0.55 - 1.02 MG/DL  
 BUN/Creatinine ratio 11 (L) 12 - 20 GFR est AA >60 >60 ml/min/1.73m2 GFR est non-AA >60 >60 ml/min/1.73m2 Calcium 7.9 (L) 8.5 - 10.1 MG/DL Bilirubin, total 0.5 0.2 - 1.0 MG/DL  
 ALT (SGPT) 99 (H) 12 - 78 U/L  
 AST (SGOT) 37 15 - 37 U/L Alk. phosphatase 189 (H) 45 - 117 U/L Protein, total 5.6 (L) 6.4 - 8.2 g/dL Albumin 2.7 (L) 3.5 - 5.0 g/dL Globulin 2.9 2.0 - 4.0 g/dL A-G Ratio 0.9 (L) 1.1 - 2.2    
CBC W/O DIFF Collection Time: 09/01/18  3:39 AM  
Result Value Ref Range WBC 4.6 3.6 - 11.0 K/uL  
 RBC 2.60 (L) 3.80 - 5.20 M/uL HGB 8.2 (L) 11.5 - 16.0 g/dL HCT 25.1 (L) 35.0 - 47.0 % MCV 96.5 80.0 - 99.0 FL  
 MCH 31.5 26.0 - 34.0 PG  
 MCHC 32.7 30.0 - 36.5 g/dL  
 RDW 18.0 (H) 11.5 - 14.5 % PLATELET 769 (L) 894 - 400 K/uL MPV 10.5 8.9 - 12.9 FL  
 NRBC 0.0 0  WBC ABSOLUTE NRBC 0.00 0.00 - 0.01 K/uL GLUCOSE, POC Collection Time: 09/01/18  6:56 AM  
Result Value Ref Range Glucose (POC) 153 (H) 65 - 100 mg/dL Performed by Ruby Barragan   
GLUCOSE, POC Collection Time: 09/01/18 11:04 AM  
Result Value Ref Range Glucose (POC) 230 (H) 65 - 100 mg/dL Performed by Brie Cordova Available Xrays reviewed: 
 
Chemotherapy monitored and toxicities assessed: 
 
Assessment and Plan 1. Pancreatic Mass. .. Repeat biopsy pending 9/1/2018.  s/p egd and biopsy 8/28 the path is negative 2. Anemia. . Secondary to presumed gi bleeding, hgb is up to  9/1 .decreased to 8.3 on 9/1 3. Deconditioning. .. Physical therapy planned She has likely pancreatic cancer with anemia secondary to GI bleed We are awaiting pathology We will transfuse as needed. I have given contact info if she is stable for discharge to f/u next week with Jose Alejandro Lagos MD

## 2018-09-01 NOTE — PROGRESS NOTES
Problem: Falls - Risk of 
Goal: *Absence of Falls Document Hilary Yadav Fall Risk and appropriate interventions in the flowsheet. Outcome: Progressing Towards Goal 
Fall Risk Interventions: 
Mobility Interventions: Bed/chair exit alarm, Communicate number of staff needed for ambulation/transfer, Patient to call before getting OOB Mentation Interventions: Bed/chair exit alarm, Room close to nurse's station, More frequent rounding, Reorient patient, Increase mobility Medication Interventions: Evaluate medications/consider consulting pharmacy, Patient to call before getting OOB, Teach patient to arise slowly Elimination Interventions: Call light in reach, Patient to call for help with toileting needs, Toilet paper/wipes in reach History of Falls Interventions: Door open when patient unattended, Evaluate medications/consider consulting pharmacy

## 2018-09-01 NOTE — PROGRESS NOTES
Bedside shift change report given to [address] [place] [address] Avenue (oncoming nurse) by Mykel Tejada RN (offgoing nurse). Report included the following information SBAR, Kardex, Intake/Output, MAR and Recent Results.

## 2018-09-01 NOTE — PROGRESS NOTES
GI PROGRESS NOTE 
 
NAME:             Ronda De Jesus :              1940 MRN:              [id number] Admit Date:     2018 Todays Date:  2018 Subjective:  
     
  Feels ok. Tolerating diet No nausea or vomiting. Medications-reviewed Current Facility-Administered Medications Medication Dose Route Frequency  insulin glargine (LANTUS) injection 8 Units  8 Units SubCUTAneous DAILY  albuterol-ipratropium (DUO-NEB) 2.5 MG-0.5 MG/3 ML  3 mL Nebulization Q6H PRN  polyethylene glycol (MIRALAX) packet 17 g  17 g Oral DAILY  0.9% sodium chloride infusion 250 mL  250 mL IntraVENous PRN  
 acetaminophen (TYLENOL) tablet 650 mg  650 mg Oral Q4H PRN  
 0.9% sodium chloride infusion 250 mL  250 mL IntraVENous PRN  
 0.9% sodium chloride infusion  125 mL/hr IntraVENous CONTINUOUS  
 pantoprazole (PROTONIX) 40 mg in sodium chloride 0.9% 10 mL injection  40 mg IntraVENous Q12H  
 azelastine (ASTELIN) 137mcg/spray nasal spray  2 Spray Both Nostrils BID  dorzolamide (TRUSOPT) 2 % ophthalmic solution 1 Drop  1 Drop Both Eyes TID  diazePAM (VALIUM) tablet 2 mg  2 mg Oral QHS PRN  
 ferrous sulfate tablet 325 mg  1 Tab Oral ACB  latanoprost (XALATAN) 0.005 % ophthalmic solution 1 Drop  1 Drop Both Eyes QHS  losartan (COZAAR) tablet 50 mg  50 mg Oral DAILY  timolol (TIMOPTIC) 0.5 % ophthalmic solution 1 Drop  1 Drop Both Eyes DAILY  sodium chloride (NS) flush 5-10 mL  5-10 mL IntraVENous Q8H  
 sodium chloride (NS) flush 5-10 mL  5-10 mL IntraVENous PRN  
 fentaNYL citrate (PF) injection 25 mcg  25 mcg IntraVENous Q4H PRN  
 ondansetron (ZOFRAN) injection 4 mg  4 mg IntraVENous Q4H PRN  
 insulin lispro (HUMALOG) injection   SubCUTAneous AC&HS  
 glucose chewable tablet 16 g  4 Tab Oral PRN  
 dextrose (D50W) injection syrg 12.5-25 g  12.5-25 g IntraVENous PRN  
 glucagon (GLUCAGEN) injection 1 mg  1 mg IntraMUSCular PRN Objective: Patient Vitals for the past 8 hrs: 
 BP Temp Pulse Resp SpO2  
09/01/18 0753 131/72 98.8 °F (37.1 °C) 67 16 98 % 08/30 1901 - 09/01 0700 In: 2308.3 [I.V.:2308.3] Out: 650 [Urine:650] EXAM:   
 NEURO-a&o HEENT-wnl LUNGS-clear COR-regular rate and rhythym ABD-soft , no tenderness, no rebound, good bs LABS: 
Recent Labs  
   09/01/18 0339 08/31/18 0457 WBC  4.6  6.1 HGB  8.2*  9.1*  
HCT  25.1*  28.2*  
PLT  130*  152 Recent Labs  
   09/01/18 0339 08/31/18 0457 NA  140  138  
K  3.5  3.6 CL  110*  107 CO2  23  21 BUN  7  7 CREA  0.63  0.75 GLU  148*  191* CA  7.9*  8.6 MG   --   1.4* PHOS   --   2.5* Recent Labs  
   09/01/18 0339 08/31/18 0457 SGOT  37  38* AP  189*  172* TBILI  0.5  0.7 TP  5.6*  5.9* ALB  2.7*  2.8*  
GLOB  2.9  3.1 ALT  99*  125* Assessment: 1. Pancreatic mass---looks like carcinoma. Metastases present 2. Duodenal obstruction secondary to 1. 
3. Anemia--acute blood loss, hgb down some today Principal Problem: 
  Acute upper GI bleed (8/27/2018) Plan: 1. FNA path results won't be back until Tuesday 2. Full liquid diet, advance as tolerated 3. Serial CBC

## 2018-09-01 NOTE — PROCEDURES
[address]  *** FINAL REPORT ***    Name: Manas Bertrand  MRN: [id number]    Inpatient  : 07 Aug 1940  HIS Order #: [id number]  [id number] [facility] Visit #: [id number]  Date: 01 Sep 2018    TYPE OF TEST: Peripheral Venous Testing    REASON FOR TEST  Limb swelling    Right Arm:-  Deep venous thrombosis:           No  Superficial venous thrombosis:    No      INTERPRETATION/FINDINGS  PROCEDURE:  Color duplex ultrasound imaging of upper extremity veins. FINDINGS:       Right:  The internal jugular, subclavian, axillary, brachial and  cephalic veins are patent and without evidence of thrombus;  each is  fully compressible and there is no narrowing of the flow channel on  color Doppler imaging. The basilic vein was not visualized. Portions  of the cephalic vein were not visualized due to small vessel size. Phasic/pulsatile flow is observed in the subclavian vein. Left:    The subclavian vein is patent and without evidence of  thrombus. Phasic/pulsatile flow is observed. This side was not  otherwise evaluated. IMPRESSION:  No evidence of right upper extremity vein thrombosis  where visualized. ADDITIONAL COMMENTS    I have personally reviewed the data relevant to the interpretation of  this  study.     TECHNOLOGIST: Henrietta Zavala RVT  Signed: 2018 03:13 PM    PHYSICIAN: Sierra Snyder., MD  Signed: 2018 08:22 AM

## 2018-09-01 NOTE — PROGRESS NOTES
Hospitalist Progress Note Cortez Rojo MD 
Answering service: [phone] OR [phone] from in house phone Date of Service:  2018 NAME:  Dia Brody :  1940 MRN:  [id number] Admission Summary:  
[de-identified] yo woman with DM2, dyslipidemia, CAD s/p CABG, SSS s/p PPM presented to the ED from home on 18 with worsening abdominal pain, nausea, vomiting. CT a/p in the ED showed findings worrisome for metastatic pancreatic adenocarcinoma. She was admitted with significant anemia and heme-positive stool likely due to UGIB. Interval history / Subjective:  
Still c/o left upper abdo/left rib cage pain, left arm swelling; d/w nurse, no overnight events, still on 2L O2 NC post-procedure Assessment & Plan:  
 
Acute blood loss anemia (POA) - s/p 3 units PRBC since admission 
- likely due to metastatic pancreatic CA exacerbated by GI blood loss  
- iron, B12, folate WNL 
   
GI bleed (POA) - FOBT + 
- IV PPI BID 
- pt denies hematochezia/melena - GI consulted - s/p EGD ; pathology benign - CA 19-9 and CEA WNL 
- hold ASA and apixaban - s/p EUS  duodenal obstruction in second portion from extrinsic compression, 2x3cm pancreatic head mass invading distal CBD with FNB; prelim path malignant cells; path pending 
    
Prerenal azotemia (POA) - likely due to GI bleed 
- resolved with IVF and BT 
- holding home HCTZ 
   
DM2 with hyperglycemia - last A1c 7.8 (2018), SSI 
- holding home metformin, linaclotide, pioglitazone, glipizide 
- resumed home insulin and adjust dose prn 
   
Elevated LFTs - RUQ US  Pancreatic head mass and biliary dilation. Trace gallbladder sludge without stones. Uniform liver. - trending down 
   
Suspected metastatic pancreatic CA - Oncology following -  pathology benign - CA 19-9 and CEA WNL 
- s/p EUS ; final path pending but prelim malignant cells    
 Multiple b/l lung nodules (POA) - seen on CXR 
- CTA chest 8/27 Multiple pulmonary nodules, likely metastases. 
   
SSS s/p PPM - hold apixaban and ASA for now; resume when appropriate 
   
DLD - hold home statin due to transaminitis 
   
HTN - controlled on home meds; hold home HCTZ inpatient 
   
CAD s/p CABG - hold ASA (due to GIB) and statin (due to transaminitis) 
- resumed home ARB 
- no BB on PTA med list 
  
Mild hypokalemia - replete prn Hypomagnesemia - replete prn Reported \"wheezing\" - nebs prn although none heard on exam and pt has not reported such to nursing staff RUE edema - check venous duplex r/o DVT 
- keep elevated; dc IVF Persistent LUQ/chest wall pain (POA) - likely due to pancreatic mass but check XR ribs 
   
Code status: Full DVT prophylaxis:SCDs; resume apixaban when appropriate Care Plan discussed with: Patient/Family, Nurse and . Long d/w daughter Ms Carpio 8/31 during bedside rounds. Disposition: TBD. Likely dc to [facility] when medically stable for discharge. Poor prognosis and pt with limited insight. Hospital Problems  Date Reviewed: 8/27/2018 Codes Class Noted POA * (Principal)Acute upper GI bleed ICD-10-CM: K92.2 ICD-9-CM: 578.9  8/27/2018 Yes Review of Systems:  
Pertinent items are noted in HPI. Vital Signs:  
 Last 24hrs VS reviewed since prior progress note. Most recent are: 
Visit Vitals  /72 (BP 1 Location: Left arm, BP Patient Position: At rest)  Pulse 67  Temp 98.8 °F (37.1 °C)  Resp 16  
 Ht 5' 3\" (1.6 m)  Wt 78.2 kg (172 lb 8 oz)  SpO2 98%  BMI 30.56 kg/m2 Intake/Output Summary (Last 24 hours) at 09/01/18 1159 Last data filed at 09/01/18 2718 Gross per 24 hour Intake              200 ml Output              650 ml Net             -450 ml Physical Examination:  
 
Gen: awake, NAD, obese CVS: regular rhythm, normal rate, no m/r/g appreciated, RUE peripheral edema Lungs: CTAB anteriorly, no w/r/r Abd: +BS, soft, ND, NT 
MSK: NICOLAS Neuro: awake, alert, responds appropriately to questions and commands but very talkative Data Review:  
 Review and/or order of clinical lab test 
Review and/or order of tests in the radiology section of [facility] Review and/or order of tests in the medicine section of [facility] Labs:  
 
Recent Labs  
   09/01/18 0339 08/31/18 0457 WBC  4.6  6.1 HGB  8.2*  9.1*  
HCT  25.1*  28.2*  
PLT  130*  152 Recent Labs  
   09/01/18 0339 08/31/18 0457 NA  140  138  
K  3.5  3.6 CL  110*  107 CO2  23  21 BUN  7  7 CREA  0.63  0.75 GLU  148*  191* CA  7.9*  8.6 MG   --   1.4* PHOS   --   2.5* Recent Labs  
   09/01/18 0339 08/31/18 0457 SGOT  37  38* ALT  99*  125* AP  189*  172* TBILI  0.5  0.7 TP  5.6*  5.9* ALB  2.7*  2.8*  
GLOB  2.9  3.1 No results for input(s): INR, PTP, APTT in the last 72 hours. No lab exists for component: INREXT, INREXT No results for input(s): FE, TIBC, PSAT, FERR in the last 72 hours. Lab Results Component Value Date/Time Folate 25.3 (H) 08/27/2018 03:42 AM  
  
No results for input(s): PH, PCO2, PO2 in the last 72 hours. No results for input(s): CPK, CKNDX, TROIQ in the last 72 hours. No lab exists for component: CPKMB Lab Results Component Value Date/Time Cholesterol, total 82 08/28/2018 04:48 AM  
 HDL Cholesterol 38 08/28/2018 04:48 AM  
 LDL, calculated 27.6 08/28/2018 04:48 AM  
 Triglyceride 82 08/28/2018 04:48 AM  
 CHOL/HDL Ratio 2.2 08/28/2018 04:48 AM  
 
Lab Results Component Value Date/Time Glucose (POC) 230 (H) 09/01/2018 11:04 AM  
 Glucose (POC) 153 (H) 09/01/2018 06:56 AM  
 Glucose (POC) 216 (H) 08/31/2018 09:42 PM  
 Glucose (POC) 221 (H) 08/31/2018 04:45 PM  
 Glucose (POC) 145 (H) 08/31/2018 11:09 AM  
 
Lab Results Component Value Date/Time  Color YELLOW/STRAW 08/27/2018 04:37 AM  
 Appearance CLEAR 08/27/2018 04:37 AM  
 Specific gravity 1.019 08/27/2018 04:37 AM  
 pH (UA) 5.0 08/27/2018 04:37 AM  
 Protein NEGATIVE  08/27/2018 04:37 AM  
 Glucose NEGATIVE  08/27/2018 04:37 AM  
 Ketone TRACE (A) 08/27/2018 04:37 AM  
 Bilirubin NEGATIVE  08/27/2018 04:37 AM  
 Urobilinogen 0.2 08/27/2018 04:37 AM  
 Nitrites NEGATIVE  08/27/2018 04:37 AM  
 Leukocyte Esterase NEGATIVE  08/27/2018 04:37 AM  
 Epithelial cells FEW 08/27/2018 04:37 AM  
 Bacteria NEGATIVE  08/27/2018 04:37 AM  
 WBC 0-4 08/27/2018 04:37 AM  
 RBC 0-5 08/27/2018 04:37 AM  
 
Medications Reviewed:  
 
Current Facility-Administered Medications Medication Dose Route Frequency  insulin glargine (LANTUS) injection 8 Units  8 Units SubCUTAneous DAILY  albuterol-ipratropium (DUO-NEB) 2.5 MG-0.5 MG/3 ML  3 mL Nebulization Q6H PRN  polyethylene glycol (MIRALAX) packet 17 g  17 g Oral DAILY  0.9% sodium chloride infusion 250 mL  250 mL IntraVENous PRN  
 acetaminophen (TYLENOL) tablet 650 mg  650 mg Oral Q4H PRN  
 0.9% sodium chloride infusion 250 mL  250 mL IntraVENous PRN  
 0.9% sodium chloride infusion  125 mL/hr IntraVENous CONTINUOUS  
 pantoprazole (PROTONIX) 40 mg in sodium chloride 0.9% 10 mL injection  40 mg IntraVENous Q12H  
 azelastine (ASTELIN) 137mcg/spray nasal spray  2 Spray Both Nostrils BID  dorzolamide (TRUSOPT) 2 % ophthalmic solution 1 Drop  1 Drop Both Eyes TID  diazePAM (VALIUM) tablet 2 mg  2 mg Oral QHS PRN  
 ferrous sulfate tablet 325 mg  1 Tab Oral ACB  latanoprost (XALATAN) 0.005 % ophthalmic solution 1 Drop  1 Drop Both Eyes QHS  losartan (COZAAR) tablet 50 mg  50 mg Oral DAILY  timolol (TIMOPTIC) 0.5 % ophthalmic solution 1 Drop  1 Drop Both Eyes DAILY  sodium chloride (NS) flush 5-10 mL  5-10 mL IntraVENous Q8H  
 sodium chloride (NS) flush 5-10 mL  5-10 mL IntraVENous PRN  
 fentaNYL citrate (PF) injection 25 mcg  25 mcg IntraVENous Q4H PRN  
  ondansetron (ZOFRAN) injection 4 mg  4 mg IntraVENous Q4H PRN  
 insulin lispro (HUMALOG) injection   SubCUTAneous AC&HS  
 glucose chewable tablet 16 g  4 Tab Oral PRN  
 dextrose (D50W) injection syrg 12.5-25 g  12.5-25 g IntraVENous PRN  
 glucagon (GLUCAGEN) injection 1 mg  1 mg IntraMUSCular PRN  
 
______________________________________________________________________ EXPECTED LENGTH OF STAY: 3d 2h 
ACTUAL LENGTH OF STAY:          5 Monika Avila MD

## 2018-09-02 LAB
ALBUMIN SERPL-MCNC: 2.7 G/DL (ref 3.5–5)
ALBUMIN/GLOB SERPL: 1 {RATIO} (ref 1.1–2.2)
ALP SERPL-CCNC: 181 U/L (ref 45–117)
ALT SERPL-CCNC: 87 U/L (ref 12–78)
ANION GAP SERPL CALC-SCNC: 12 MMOL/L (ref 5–15)
AST SERPL-CCNC: 38 U/L (ref 15–37)
BILIRUB SERPL-MCNC: 0.6 MG/DL (ref 0.2–1)
BUN SERPL-MCNC: 5 MG/DL (ref 6–20)
BUN/CREAT SERPL: 9 (ref 12–20)
CALCIUM SERPL-MCNC: 8.1 MG/DL (ref 8.5–10.1)
CHLORIDE SERPL-SCNC: 110 MMOL/L (ref 97–108)
CO2 SERPL-SCNC: 22 MMOL/L (ref 21–32)
CREAT SERPL-MCNC: 0.56 MG/DL (ref 0.55–1.02)
ERYTHROCYTE [DISTWIDTH] IN BLOOD BY AUTOMATED COUNT: 17.7 % (ref 11.5–14.5)
GLOBULIN SER CALC-MCNC: 2.8 G/DL (ref 2–4)
GLUCOSE BLD STRIP.AUTO-MCNC: 120 MG/DL (ref 65–100)
GLUCOSE BLD STRIP.AUTO-MCNC: 170 MG/DL (ref 65–100)
GLUCOSE BLD STRIP.AUTO-MCNC: 200 MG/DL (ref 65–100)
GLUCOSE BLD STRIP.AUTO-MCNC: 80 MG/DL (ref 65–100)
GLUCOSE SERPL-MCNC: 115 MG/DL (ref 65–100)
HCT VFR BLD AUTO: 25.7 % (ref 35–47)
HGB BLD-MCNC: 8.3 G/DL (ref 11.5–16)
MAGNESIUM SERPL-MCNC: 1.8 MG/DL (ref 1.6–2.4)
MCH RBC QN AUTO: 31 PG (ref 26–34)
MCHC RBC AUTO-ENTMCNC: 32.3 G/DL (ref 30–36.5)
MCV RBC AUTO: 95.9 FL (ref 80–99)
NRBC # BLD: 0 K/UL (ref 0–0.01)
NRBC BLD-RTO: 0 PER 100 WBC
PLATELET # BLD AUTO: 132 K/UL (ref 150–400)
PMV BLD AUTO: 10.3 FL (ref 8.9–12.9)
POTASSIUM SERPL-SCNC: 3.5 MMOL/L (ref 3.5–5.1)
PROT SERPL-MCNC: 5.5 G/DL (ref 6.4–8.2)
RBC # BLD AUTO: 2.68 M/UL (ref 3.8–5.2)
SERVICE CMNT-IMP: ABNORMAL
SERVICE CMNT-IMP: NORMAL
SODIUM SERPL-SCNC: 144 MMOL/L (ref 136–145)
WBC # BLD AUTO: 4.2 K/UL (ref 3.6–11)

## 2018-09-02 PROCEDURE — 74011000250 HC RX REV CODE- 250: Performed by: INTERNAL MEDICINE

## 2018-09-02 PROCEDURE — 74011250637 HC RX REV CODE- 250/637: Performed by: INTERNAL MEDICINE

## 2018-09-02 PROCEDURE — 94664 DEMO&/EVAL PT USE INHALER: CPT

## 2018-09-02 PROCEDURE — 74011000250 HC RX REV CODE- 250: Performed by: HOSPITALIST

## 2018-09-02 PROCEDURE — 83735 ASSAY OF MAGNESIUM: CPT | Performed by: HOSPITALIST

## 2018-09-02 PROCEDURE — 82962 GLUCOSE BLOOD TEST: CPT

## 2018-09-02 PROCEDURE — C9113 INJ PANTOPRAZOLE SODIUM, VIA: HCPCS | Performed by: INTERNAL MEDICINE

## 2018-09-02 PROCEDURE — 74011250636 HC RX REV CODE- 250/636: Performed by: INTERNAL MEDICINE

## 2018-09-02 PROCEDURE — 94640 AIRWAY INHALATION TREATMENT: CPT

## 2018-09-02 PROCEDURE — 85027 COMPLETE CBC AUTOMATED: CPT | Performed by: INTERNAL MEDICINE

## 2018-09-02 PROCEDURE — 74011636637 HC RX REV CODE- 636/637: Performed by: INTERNAL MEDICINE

## 2018-09-02 PROCEDURE — 94760 N-INVAS EAR/PLS OXIMETRY 1: CPT

## 2018-09-02 PROCEDURE — 74011250637 HC RX REV CODE- 250/637: Performed by: HOSPITALIST

## 2018-09-02 PROCEDURE — 65660000000 HC RM CCU STEPDOWN

## 2018-09-02 PROCEDURE — 80053 COMPREHEN METABOLIC PANEL: CPT | Performed by: INTERNAL MEDICINE

## 2018-09-02 PROCEDURE — 36415 COLL VENOUS BLD VENIPUNCTURE: CPT | Performed by: INTERNAL MEDICINE

## 2018-09-02 RX ORDER — OXYCODONE HYDROCHLORIDE 5 MG/1
5 TABLET ORAL
Status: DISCONTINUED | OUTPATIENT
Start: 2018-09-02 | End: 2018-09-04 | Stop reason: HOSPADM

## 2018-09-02 RX ORDER — LIDOCAINE 50 MG/G
1 PATCH TOPICAL EVERY 24 HOURS
Status: DISCONTINUED | OUTPATIENT
Start: 2018-09-02 | End: 2018-09-04 | Stop reason: HOSPADM

## 2018-09-02 RX ADMIN — INSULIN LISPRO 2 UNITS: 100 INJECTION, SOLUTION INTRAVENOUS; SUBCUTANEOUS at 16:18

## 2018-09-02 RX ADMIN — SODIUM CHLORIDE 40 MG: 9 INJECTION INTRAMUSCULAR; INTRAVENOUS; SUBCUTANEOUS at 21:18

## 2018-09-02 RX ADMIN — DORZOLAMIDE HCL 1 DROP: 20 SOLUTION/ DROPS OPHTHALMIC at 09:38

## 2018-09-02 RX ADMIN — POLYETHYLENE GLYCOL 3350 17 G: 17 POWDER, FOR SOLUTION ORAL at 08:55

## 2018-09-02 RX ADMIN — AZELASTINE HYDROCHLORIDE 2 SPRAY: 137 SPRAY, METERED NASAL at 17:14

## 2018-09-02 RX ADMIN — Medication 10 ML: at 16:18

## 2018-09-02 RX ADMIN — INSULIN LISPRO 3 UNITS: 100 INJECTION, SOLUTION INTRAVENOUS; SUBCUTANEOUS at 12:11

## 2018-09-02 RX ADMIN — TIMOLOL MALEATE 1 DROP: 5 SOLUTION OPHTHALMIC at 09:38

## 2018-09-02 RX ADMIN — Medication 10 ML: at 07:16

## 2018-09-02 RX ADMIN — LOSARTAN POTASSIUM 50 MG: 50 TABLET ORAL at 08:56

## 2018-09-02 RX ADMIN — Medication 10 ML: at 21:18

## 2018-09-02 RX ADMIN — OXYCODONE HYDROCHLORIDE 5 MG: 5 TABLET ORAL at 10:47

## 2018-09-02 RX ADMIN — INSULIN GLARGINE 8 UNITS: 100 INJECTION, SOLUTION SUBCUTANEOUS at 09:36

## 2018-09-02 RX ADMIN — FERROUS SULFATE TAB 325 MG (65 MG ELEMENTAL FE) 325 MG: 325 (65 FE) TAB at 07:18

## 2018-09-02 RX ADMIN — SODIUM CHLORIDE 40 MG: 9 INJECTION INTRAMUSCULAR; INTRAVENOUS; SUBCUTANEOUS at 09:37

## 2018-09-02 RX ADMIN — AZELASTINE HYDROCHLORIDE 2 SPRAY: 137 SPRAY, METERED NASAL at 09:35

## 2018-09-02 RX ADMIN — IPRATROPIUM BROMIDE AND ALBUTEROL SULFATE 3 ML: .5; 3 SOLUTION RESPIRATORY (INHALATION) at 13:51

## 2018-09-02 RX ADMIN — DORZOLAMIDE HCL 1 DROP: 20 SOLUTION/ DROPS OPHTHALMIC at 17:14

## 2018-09-02 NOTE — PROGRESS NOTES
Hematology-Oncology Progress Note Suzie Russell 1940 
[id number] 
9/2/2018 Follow-up for: pancreatic carcinoma? [x]        Chart notes since last visit reviewed [x]        Medications reviewed for allergies and interactions Case discussed with the following:         []            
               []        Nursing Staff  
                                                                      []        Pathologist 
                                                                      []        FAMILY Subjective:  
 
Spoke with patient who complains of: No new problems reported Objective:  
 
Patient Vitals for the past 24 hrs: 
 BP Temp Pulse Resp SpO2  
09/02/18 0809 172/82 97.8 °F (36.6 °C) 65 16 100 % 09/02/18 0330 133/68 97.3 °F (36.3 °C) 67 16 100 % 09/01/18 2244 - - - - 96 % 09/01/18 2056 134/68 97.6 °F (36.4 °C) 66 16 96 % 09/01/18 1747 - - - - 99 % 09/01/18 1418 125/80 98.2 °F (36.8 °C) 65 16 99 % Assessment and Plan 1. Pancreatic Mass. .. Repeat biopsy pending 9/2/2018.  s/p egd and biopsy 8/28 that  path is negative 2. Anemia. . Secondary to presumed gi bleeding, hgb is up to  9/1 .decreased to 8.3 on 9/1 3. Deconditioning. .. Physical therapy planned She has likely pancreatic cancer with anemia secondary to GI bleed We are awaiting pathology We will transfuse as needed. I have given contact info if she is stable for discharge to f/u next week with Amisha Brenner MD

## 2018-09-02 NOTE — PROGRESS NOTES
Bedside shift change report given to Wendy Jose (oncoming nurse) by Boni Galindo (offgoing nurse). Report included the following information SBAR, Kardex, MAR and Recent Results.

## 2018-09-02 NOTE — PROGRESS NOTES
Problem: Falls - Risk of 
Goal: *Absence of Falls Document Ha Enriquez Fall Risk and appropriate interventions in the flowsheet. Outcome: Progressing Towards Goal 
Fall Risk Interventions: 
Mobility Interventions: Patient to call before getting OOB Mentation Interventions: Door open when patient unattended Medication Interventions: Patient to call before getting OOB Elimination Interventions: Call light in reach History of Falls Interventions: Door open when patient unattended

## 2018-09-02 NOTE — PROGRESS NOTES
Problem: Falls - Risk of 
Goal: *Absence of Falls Document Mary Dominguez Fall Risk and appropriate interventions in the flowsheet. Outcome: Progressing Towards Goal 
Fall Risk Interventions: 
Mobility Interventions: Patient to call before getting OOB Mentation Interventions: Door open when patient unattended, Adequate sleep, hydration, pain control Medication Interventions: Patient to call before getting OOB, Teach patient to arise slowly Elimination Interventions: Call light in reach, Patient to call for help with toileting needs History of Falls Interventions: Door open when patient unattended

## 2018-09-02 NOTE — PROGRESS NOTES
Hospitalist Progress Note Donovan Hawkins MD 
Answering service: [phone] OR [phone] from in house phone Date of Service:  2018 NAME:  Wilson Knight :  1940 MRN:  [id number] Admission Summary:  
[de-identified] yo woman with DM2, dyslipidemia, CAD s/p CABG, SSS s/p PPM presented to the ED from home on 18 with worsening abdominal pain, nausea, vomiting. CT a/p in the ED showed findings worrisome for metastatic pancreatic adenocarcinoma. She was admitted with significant anemia and heme-positive stool likely due to UGIB. Interval history / Subjective:  
Patient complaints of left sided abdominal/rib cage pain, 5/10 intensity. Was nauseous yesterday after she drank tomato juice. Assessment & Plan:  
 
Acute blood loss anemia (POA) - s/p 3 units PRBC since admission 
- likely due to metastatic pancreatic CA exacerbated by GI blood loss  
- iron, B12, folate WNL 
-Hb stable 
   
GI bleed (POA) - FOBT + 
- IV PPI BID 
- pt denies hematochezia/melena - GI consulted - s/p EGD ; pathology benign - CA 19-9 and CEA WNL 
- hold ASA and apixaban - s/p EUS  duodenal obstruction in second portion from extrinsic compression, 2x3cm pancreatic head mass invading distal CBD with FNB; prelim path malignant cells; path pending. 
-Will advance diet as tolerated. 
    
Prerenal azotemia (POA) - likely due to GI bleed 
- resolved with IVF and BT 
- holding home HCTZ 
   
DM2 with hyperglycemia - last A1c 7.8 (2018), SSI 
- holding home metformin, linaclotide, pioglitazone, glipizide 
- resumed home insulin and adjust dose prn 
   
Elevated LFTs - RUQ US  Pancreatic head mass and biliary dilation. Trace gallbladder sludge without stones. Uniform liver. - trending down. 
   
Suspected metastatic pancreatic CA - Oncology following -  pathology benign - CA 19-9 and CEA WNL - s/p EUS 8/31; final path pending but prelim malignant cells 
   
Multiple b/l lung nodules (POA) - seen on CXR 
- CTA chest 8/27 Multiple pulmonary nodules, likely metastases. 
   
SSS s/p PPM - hold apixaban and ASA for now; resume when appropriate 
   
DLD - hold home statin due to transaminitis 
   
HTN - controlled on home meds; hold home HCTZ inpatient 
   
CAD s/p CABG - hold ASA (due to GIB) and statin (due to transaminitis) 
- resumed home ARB 
- no BB on PTA med list 
  
Mild hypokalemia - replete prn Hypomagnesemia - replete prn Wheezing- better today,continue  duonebs. RUE edema -  venous duplex negative for DVT 
- keep elevated;  
 
Persistent LUQ/chest wall pain (POA) - likely due to pancreatic mass. CXR left ribs - no fractures. -Apply lidocaine patch. 
-Will start prn oxycodone today. Stop fentanyl. 
   
Code status: Full DVT prophylaxis:SCDs; resume apixaban when appropriate Care Plan discussed with: Patient and nurse. Disposition: TBD. Likely dc to [facility] when medically stable for discharge. Poor prognosis and pt with limited insight. Hospital Problems  Date Reviewed: 8/27/2018 Codes Class Noted POA * (Principal)Acute upper GI bleed ICD-10-CM: K92.2 ICD-9-CM: 578.9  8/27/2018 Yes Review of Systems:  
Pertinent items are noted in HPI. Vital Signs:  
 Last 24hrs VS reviewed since prior progress note. Most recent are: 
Visit Vitals  /89 (BP 1 Location: Right arm, BP Patient Position: At rest)  Pulse 66  Temp 97.9 °F (36.6 °C)  Resp 16  
 Ht 5' 3\" (1.6 m)  Wt 78.2 kg (172 lb 8 oz)  SpO2 99%  BMI 30.56 kg/m2 Intake/Output Summary (Last 24 hours) at 09/02/18 1520 Last data filed at 09/01/18 6590 Gross per 24 hour Intake              100 ml Output                0 ml Net              100 ml Physical Examination:  
 
Gen: awake, NAD, obese. HEENT: sclera anicteric. CVS: regular rhythm, normal rate, no m/r/g appreciated, RUE peripheral edema Lungs: CTAB anteriorly, no w/r/r Abd: +BS, soft, ND, NT 
MSK: NICOLAS Neuro: awake, alert, responds appropriately to questions and commands. Was able to move all the extremities. Data Review:  
 Review and/or order of clinical lab test 
Review and/or order of tests in the radiology section of [facility] Review and/or order of tests in the medicine section of [facility] Labs:  
 
Recent Labs  
   09/02/18 
 1599  09/01/18 
 9766 WBC  4.2  4.6 HGB  8.3*  8.2* HCT  25.7*  25.1*  
PLT  132*  130* Recent Labs  
   09/02/18 
 7514  09/01/18 
 0052  08/31/18 
 0457 NA  144  140  138  
K  3.5  3.5  3.6 CL  110*  110*  107 CO2  22  23  21 BUN  5*  7  7 CREA  0.56  0.63  0.75 GLU  115*  148*  191* CA  8.1*  7.9*  8.6 MG  1.8   --   1.4* PHOS   --    --   2.5* Recent Labs  
   09/02/18 
 2993  09/01/18 
 3929  08/31/18 
 0457 SGOT  38*  37  38* ALT  87*  99*  125* AP  181*  189*  172* TBILI  0.6  0.5  0.7 TP  5.5*  5.6*  5.9* ALB  2.7*  2.7*  2.8*  
GLOB  2.8  2.9  3.1 No results for input(s): INR, PTP, APTT in the last 72 hours. No lab exists for component: INREXT, INREXT No results for input(s): FE, TIBC, PSAT, FERR in the last 72 hours. Lab Results Component Value Date/Time Folate 25.3 (H) 08/27/2018 03:42 AM  
  
No results for input(s): PH, PCO2, PO2 in the last 72 hours. No results for input(s): CPK, CKNDX, TROIQ in the last 72 hours. No lab exists for component: CPKMB Lab Results Component Value Date/Time Cholesterol, total 82 08/28/2018 04:48 AM  
 HDL Cholesterol 38 08/28/2018 04:48 AM  
 LDL, calculated 27.6 08/28/2018 04:48 AM  
 Triglyceride 82 08/28/2018 04:48 AM  
 CHOL/HDL Ratio 2.2 08/28/2018 04:48 AM  
 
Lab Results Component Value Date/Time  Glucose (POC) 200 (H) 09/02/2018 11:06 AM  
 Glucose (POC) 120 (H) 09/02/2018 07:07 AM  
 Glucose (POC) 115 (H) 09/01/2018 09:25 PM  
 Glucose (POC) 152 (H) 09/01/2018 04:52 PM  
 Glucose (POC) 230 (H) 09/01/2018 11:04 AM  
 
Lab Results Component Value Date/Time Color YELLOW/STRAW 08/27/2018 04:37 AM  
 Appearance CLEAR 08/27/2018 04:37 AM  
 Specific gravity 1.019 08/27/2018 04:37 AM  
 pH (UA) 5.0 08/27/2018 04:37 AM  
 Protein NEGATIVE  08/27/2018 04:37 AM  
 Glucose NEGATIVE  08/27/2018 04:37 AM  
 Ketone TRACE (A) 08/27/2018 04:37 AM  
 Bilirubin NEGATIVE  08/27/2018 04:37 AM  
 Urobilinogen 0.2 08/27/2018 04:37 AM  
 Nitrites NEGATIVE  08/27/2018 04:37 AM  
 Leukocyte Esterase NEGATIVE  08/27/2018 04:37 AM  
 Epithelial cells FEW 08/27/2018 04:37 AM  
 Bacteria NEGATIVE  08/27/2018 04:37 AM  
 WBC 0-4 08/27/2018 04:37 AM  
 RBC 0-5 08/27/2018 04:37 AM  
 
Medications Reviewed:  
 
Current Facility-Administered Medications Medication Dose Route Frequency  lidocaine (LIDODERM) 5 % patch 1 Patch  1 Patch TransDERmal Q24H  
 oxyCODONE IR (ROXICODONE) tablet 5 mg  5 mg Oral Q8H PRN  
 insulin glargine (LANTUS) injection 8 Units  8 Units SubCUTAneous DAILY  albuterol-ipratropium (DUO-NEB) 2.5 MG-0.5 MG/3 ML  3 mL Nebulization Q6H PRN  polyethylene glycol (MIRALAX) packet 17 g  17 g Oral DAILY  0.9% sodium chloride infusion 250 mL  250 mL IntraVENous PRN  
 acetaminophen (TYLENOL) tablet 650 mg  650 mg Oral Q4H PRN  
 0.9% sodium chloride infusion 250 mL  250 mL IntraVENous PRN  pantoprazole (PROTONIX) 40 mg in sodium chloride 0.9% 10 mL injection  40 mg IntraVENous Q12H  
 azelastine (ASTELIN) 137mcg/spray nasal spray  2 Spray Both Nostrils BID  dorzolamide (TRUSOPT) 2 % ophthalmic solution 1 Drop  1 Drop Both Eyes TID  diazePAM (VALIUM) tablet 2 mg  2 mg Oral QHS PRN  
 ferrous sulfate tablet 325 mg  1 Tab Oral ACB  latanoprost (XALATAN) 0.005 % ophthalmic solution 1 Drop  1 Drop Both Eyes QHS  losartan (COZAAR) tablet 50 mg  50 mg Oral DAILY  timolol (TIMOPTIC) 0.5 % ophthalmic solution 1 Drop  1 Drop Both Eyes DAILY  sodium chloride (NS) flush 5-10 mL  5-10 mL IntraVENous Q8H  
 sodium chloride (NS) flush 5-10 mL  5-10 mL IntraVENous PRN  
 ondansetron (ZOFRAN) injection 4 mg  4 mg IntraVENous Q4H PRN  
 insulin lispro (HUMALOG) injection   SubCUTAneous AC&HS  
 glucose chewable tablet 16 g  4 Tab Oral PRN  
 dextrose (D50W) injection syrg 12.5-25 g  12.5-25 g IntraVENous PRN  
 glucagon (GLUCAGEN) injection 1 mg  1 mg IntraMUSCular PRN  
 
______________________________________________________________________ EXPECTED LENGTH OF STAY: 3d 2h 
ACTUAL LENGTH OF STAY:          6 Keyanna Servin MD

## 2018-09-02 NOTE — PROGRESS NOTES
Bedside and Verbal shift change report given to [facility], RN (oncoming nurse) by Eugene Singh RN (offgoing nurse). Report included the following information SBAR, Kardex, Intake/Output, MAR and Recent Results.

## 2018-09-03 LAB
ALBUMIN SERPL-MCNC: 3 G/DL (ref 3.5–5)
ALBUMIN/GLOB SERPL: 1 {RATIO} (ref 1.1–2.2)
ALP SERPL-CCNC: 218 U/L (ref 45–117)
ALT SERPL-CCNC: 91 U/L (ref 12–78)
ANION GAP SERPL CALC-SCNC: 7 MMOL/L (ref 5–15)
AST SERPL-CCNC: 50 U/L (ref 15–37)
BILIRUB SERPL-MCNC: 0.9 MG/DL (ref 0.2–1)
BUN SERPL-MCNC: 5 MG/DL (ref 6–20)
BUN/CREAT SERPL: 8 (ref 12–20)
CALCIUM SERPL-MCNC: 8.2 MG/DL (ref 8.5–10.1)
CHLORIDE SERPL-SCNC: 110 MMOL/L (ref 97–108)
CO2 SERPL-SCNC: 26 MMOL/L (ref 21–32)
CREAT SERPL-MCNC: 0.66 MG/DL (ref 0.55–1.02)
ERYTHROCYTE [DISTWIDTH] IN BLOOD BY AUTOMATED COUNT: 18.1 % (ref 11.5–14.5)
GLOBULIN SER CALC-MCNC: 3.1 G/DL (ref 2–4)
GLUCOSE BLD STRIP.AUTO-MCNC: 115 MG/DL (ref 65–100)
GLUCOSE BLD STRIP.AUTO-MCNC: 148 MG/DL (ref 65–100)
GLUCOSE BLD STRIP.AUTO-MCNC: 173 MG/DL (ref 65–100)
GLUCOSE BLD STRIP.AUTO-MCNC: 69 MG/DL (ref 65–100)
GLUCOSE BLD STRIP.AUTO-MCNC: 80 MG/DL (ref 65–100)
GLUCOSE SERPL-MCNC: 76 MG/DL (ref 65–100)
HCT VFR BLD AUTO: 29.5 % (ref 35–47)
HGB BLD-MCNC: 9.3 G/DL (ref 11.5–16)
MCH RBC QN AUTO: 31.4 PG (ref 26–34)
MCHC RBC AUTO-ENTMCNC: 31.5 G/DL (ref 30–36.5)
MCV RBC AUTO: 99.7 FL (ref 80–99)
NRBC # BLD: 0 K/UL (ref 0–0.01)
NRBC BLD-RTO: 0 PER 100 WBC
PLATELET # BLD AUTO: 155 K/UL (ref 150–400)
PMV BLD AUTO: 10.4 FL (ref 8.9–12.9)
POTASSIUM SERPL-SCNC: 3.8 MMOL/L (ref 3.5–5.1)
PROT SERPL-MCNC: 6.1 G/DL (ref 6.4–8.2)
RBC # BLD AUTO: 2.96 M/UL (ref 3.8–5.2)
SERVICE CMNT-IMP: ABNORMAL
SERVICE CMNT-IMP: NORMAL
SERVICE CMNT-IMP: NORMAL
SODIUM SERPL-SCNC: 143 MMOL/L (ref 136–145)
WBC # BLD AUTO: 5.5 K/UL (ref 3.6–11)

## 2018-09-03 PROCEDURE — 74011000250 HC RX REV CODE- 250: Performed by: INTERNAL MEDICINE

## 2018-09-03 PROCEDURE — 74011636637 HC RX REV CODE- 636/637: Performed by: INTERNAL MEDICINE

## 2018-09-03 PROCEDURE — 74011250637 HC RX REV CODE- 250/637: Performed by: INTERNAL MEDICINE

## 2018-09-03 PROCEDURE — 94760 N-INVAS EAR/PLS OXIMETRY 1: CPT

## 2018-09-03 PROCEDURE — 94640 AIRWAY INHALATION TREATMENT: CPT

## 2018-09-03 PROCEDURE — 36415 COLL VENOUS BLD VENIPUNCTURE: CPT | Performed by: HOSPITALIST

## 2018-09-03 PROCEDURE — 74011250637 HC RX REV CODE- 250/637: Performed by: HOSPITALIST

## 2018-09-03 PROCEDURE — 82962 GLUCOSE BLOOD TEST: CPT

## 2018-09-03 PROCEDURE — 80053 COMPREHEN METABOLIC PANEL: CPT | Performed by: HOSPITALIST

## 2018-09-03 PROCEDURE — 85027 COMPLETE CBC AUTOMATED: CPT | Performed by: HOSPITALIST

## 2018-09-03 PROCEDURE — 65270000032 HC RM SEMIPRIVATE

## 2018-09-03 RX ORDER — INSULIN GLARGINE 100 [IU]/ML
5 INJECTION, SOLUTION SUBCUTANEOUS DAILY
Status: DISCONTINUED | OUTPATIENT
Start: 2018-09-03 | End: 2018-09-04 | Stop reason: HOSPADM

## 2018-09-03 RX ORDER — PANTOPRAZOLE SODIUM 40 MG/1
40 TABLET, DELAYED RELEASE ORAL
Status: DISCONTINUED | OUTPATIENT
Start: 2018-09-03 | End: 2018-09-04 | Stop reason: HOSPADM

## 2018-09-03 RX ADMIN — INSULIN GLARGINE 5 UNITS: 100 INJECTION, SOLUTION SUBCUTANEOUS at 10:16

## 2018-09-03 RX ADMIN — IPRATROPIUM BROMIDE AND ALBUTEROL SULFATE 3 ML: .5; 3 SOLUTION RESPIRATORY (INHALATION) at 14:20

## 2018-09-03 RX ADMIN — LOSARTAN POTASSIUM 50 MG: 50 TABLET ORAL at 10:16

## 2018-09-03 RX ADMIN — DORZOLAMIDE HCL 1 DROP: 20 SOLUTION/ DROPS OPHTHALMIC at 10:18

## 2018-09-03 RX ADMIN — OXYCODONE HYDROCHLORIDE 5 MG: 5 TABLET ORAL at 10:45

## 2018-09-03 RX ADMIN — DORZOLAMIDE HCL 1 DROP: 20 SOLUTION/ DROPS OPHTHALMIC at 21:47

## 2018-09-03 RX ADMIN — PANTOPRAZOLE SODIUM 40 MG: 40 TABLET, DELAYED RELEASE ORAL at 10:16

## 2018-09-03 RX ADMIN — LATANOPROST 1 DROP: 50 SOLUTION OPHTHALMIC at 21:47

## 2018-09-03 RX ADMIN — FERROUS SULFATE TAB 325 MG (65 MG ELEMENTAL FE) 325 MG: 325 (65 FE) TAB at 06:53

## 2018-09-03 RX ADMIN — DORZOLAMIDE HCL 1 DROP: 20 SOLUTION/ DROPS OPHTHALMIC at 18:24

## 2018-09-03 RX ADMIN — Medication 10 ML: at 21:47

## 2018-09-03 RX ADMIN — AZELASTINE HYDROCHLORIDE 2 SPRAY: 137 SPRAY, METERED NASAL at 10:17

## 2018-09-03 RX ADMIN — Medication 10 ML: at 16:39

## 2018-09-03 RX ADMIN — INSULIN LISPRO 2 UNITS: 100 INJECTION, SOLUTION INTRAVENOUS; SUBCUTANEOUS at 12:29

## 2018-09-03 RX ADMIN — PANTOPRAZOLE SODIUM 40 MG: 40 TABLET, DELAYED RELEASE ORAL at 16:39

## 2018-09-03 RX ADMIN — INSULIN LISPRO 2 UNITS: 100 INJECTION, SOLUTION INTRAVENOUS; SUBCUTANEOUS at 16:39

## 2018-09-03 RX ADMIN — TIMOLOL MALEATE 1 DROP: 5 SOLUTION OPHTHALMIC at 10:18

## 2018-09-03 RX ADMIN — Medication 10 ML: at 06:00

## 2018-09-03 RX ADMIN — AZELASTINE HYDROCHLORIDE 2 SPRAY: 137 SPRAY, METERED NASAL at 18:24

## 2018-09-03 NOTE — ROUTINE PROCESS
Bedside shift change report given to rashaun salter rn (oncoming nurse) by Marcello Cespedes (offgoing nurse). Report included the following information SBAR and Kardex.

## 2018-09-03 NOTE — PROGRESS NOTES
2:23 PM 
CM received a call from Attending to verify patient and family SNF choice for discharge. Chart reviewed. Referral was submitted to [facility] per family's request.  CM met with patient and family to review and discuss disposition needs. CM verified with patient's daughter that facility in which referral was submitted was accurate. Discharge anticipated for tomorrow if facility has a bed available. Patient awaiting for updated therapy notes. CM submitted updated referral to facility via [facility]. CM will continue to follow and assist with disposition needs as they arise. KRISHNA Cole/NAOMI

## 2018-09-03 NOTE — PROGRESS NOTES
HYPOGLYCEMIC EPISODE DOCUMENTATION Patient with hypoglycemic episode(s) at 0633(time) on 9/3(date). BG value(s) pre-treatment 69 Was patient symptomatic? [] yes, [x] no Patient was treated with the following rescue medications/treatments: [] D50 [] Glucose tablets 
              [] Glucagon 
              [x] 4oz juice 
              [] 6oz reg soda 
              [] 8oz low fat milk BG value post-treatment: 80

## 2018-09-03 NOTE — PROGRESS NOTES
Hospitalist Progress Note Blessing Chu MD 
Answering service: [phone] OR [phone] from in house phone Date of Service:  9/3/2018 NAME:  Slim Sweet :  1940 MRN:  [id number] Admission Summary:  
[de-identified] yo woman with DM2, dyslipidemia, CAD s/p CABG, SSS s/p PPM presented to the ED from home on 18 with worsening abdominal pain, nausea, vomiting. CT a/p in the ED showed findings worrisome for metastatic pancreatic adenocarcinoma. She was admitted with significant anemia and heme-positive stool likely due to UGIB. Interval history / Subjective:  
Still c/o left upper abdo/left rib cage pain, no c/o wheezing; d/w nurse, diet advanced today by GI, had symptomatic hypoglycemic event this morning Assessment & Plan:  
 
Acute blood loss anemia (POA) - s/p 3 units PRBC since admission 
- likely due to metastatic pancreatic CA exacerbated by GI blood loss  
- iron, B12, folate WNL 
   
GI bleed (POA) - FOBT + 
- change IV to po PPI BID 
- pt denies hematochezia/melena - GI following - s/p EGD ; pathology benign - CA 19-9 and CEA WNL 
- hold ASA and apixaban - s/p EUS  duodenal obstruction in second portion from extrinsic compression, 2x3cm pancreatic head mass invading distal CBD with FNB; prelim path malignant cells; final path pending - ADAT Prerenal azotemia (POA) - likely due to GI bleed 
- resolved with IVF and BT 
- holding home HCTZ 
   
DM2 with labile glycemia - last A1c 7.8 (2018), SSI 
- holding home metformin, linaclotide, pioglitazone, glipizide 
- hyperglycemic initially but now hypoglycemic 
- resumed home insulin and adjust dose prn 
   
Elevated LFTs - RUQ US  Pancreatic head mass and biliary dilation. Trace gallbladder sludge without stones. Uniform liver. - trending down 
   
Suspected metastatic pancreatic CA - Oncology following -  pathology benign - CA 19-9 and CEA WNL - s/p EUS 8/31; final path pending but prelim malignant cells 
   
Multiple b/l lung nodules (POA) - seen on CXR 
- CTA chest 8/27 Multiple pulmonary nodules, likely metastases. 
   
SSS s/p PPM - hold apixaban and ASA for now; resume when appropriate 
   
DLD - hold home statin due to transaminitis 
   
HTN - controlled on home meds; hold home HCTZ inpatient 
   
CAD s/p CABG - hold ASA (due to GIB) and statin (due to transaminitis) 
- resumed home ARB 
- no BB on PTA med list 
  
Mild hypokalemia - replete prn Hypomagnesemia - replete prn Reported \"wheezing\" - nebs prn although none heard on exam and pt has not reported such to nursing staff - prn nebs RUE edema - venous duplex 9/2 negative DVT 
- keep elevated 
- may be dependent edema; dc'd IVF Persistent LUQ/chest wall pain (POA) - likely due to pancreatic mass 
- XR ribs 9/2 no fx 
- lidocaine patch and oxycodone prn 
   
Code status: Full DVT prophylaxis:SCDs; resume apixaban when appropriate; add NICOLÁS hose Care Plan discussed with: Patient/Family and Nurse. Long d/w daughter Ms Wright Fly 9/1 at bedside. Disposition: TBD. Likely dc to [facility] when medically stable for discharge. Poor prognosis and pt with limited insight. Hospital Problems  Date Reviewed: 8/27/2018 Codes Class Noted POA * (Principal)Acute upper GI bleed ICD-10-CM: K92.2 ICD-9-CM: 578.9  8/27/2018 Yes Review of Systems:  
Pertinent items are noted in HPI. Vital Signs:  
 Last 24hrs VS reviewed since prior progress note. Most recent are: 
Visit Vitals  /75 (BP 1 Location: Left arm, BP Patient Position: At rest)  Pulse 66  Temp 97.8 °F (36.6 °C)  Resp 16  
 Ht 5' 3\" (1.6 m)  Wt 78.2 kg (172 lb 8 oz)  SpO2 99%  BMI 30.56 kg/m2 No intake or output data in the 24 hours ending 09/03/18 0935 Physical Examination:  
 
Gen: awake, NAD, obese CVS: regular rhythm, normal rate, no m/r/g appreciated, RUE and b/l LE peripheral edema Lungs: CTAB anteriorly, no w/r/r Abd: +BS, soft, ND, NT 
MSK: NICOLAS Neuro: awake, alert, responds appropriately to questions and commands but very talkative Data Review:  
 Review and/or order of clinical lab test 
Review and/or order of tests in the radiology section of CPT Review and/or order of tests in the medicine section of [facility] Labs:  
 
Recent Labs  
   09/03/18 
 0200 09/02/18 
 1432 WBC  5.5  4.2 HGB  9.3*  8.3* HCT  29.5*  25.7*  
PLT  155  132* Recent Labs  
   09/03/18 0200 09/02/18 
 0333  09/01/18 
 2796 NA  143  144  140  
K  3.8  3.5  3.5 CL  110*  110*  110* CO2  26  22  23 BUN  5*  5*  7 CREA  0.66  0.56  0.63 GLU  76  115*  148* CA  8.2*  8.1*  7.9*  
MG   --   1.8   --   
 
Recent Labs  
   09/03/18 0200 09/02/18 
 0333  09/01/18 
 0339 SGOT  50*  38*  37 ALT  91*  87*  99* AP  218*  181*  189* TBILI  0.9  0.6  0.5 TP  6.1*  5.5*  5.6* ALB  3.0*  2.7*  2.7*  
GLOB  3.1  2.8  2.9 No results for input(s): INR, PTP, APTT in the last 72 hours. No lab exists for component: INREXT, INREXT No results for input(s): FE, TIBC, PSAT, FERR in the last 72 hours. Lab Results Component Value Date/Time Folate 25.3 (H) 08/27/2018 03:42 AM  
  
No results for input(s): PH, PCO2, PO2 in the last 72 hours. No results for input(s): CPK, CKNDX, TROIQ in the last 72 hours. No lab exists for component: CPKMB Lab Results Component Value Date/Time Cholesterol, total 82 08/28/2018 04:48 AM  
 HDL Cholesterol 38 08/28/2018 04:48 AM  
 LDL, calculated 27.6 08/28/2018 04:48 AM  
 Triglyceride 82 08/28/2018 04:48 AM  
 CHOL/HDL Ratio 2.2 08/28/2018 04:48 AM  
 
Lab Results Component Value Date/Time Glucose (POC) 80 09/03/2018 06:56 AM  
 Glucose (POC) 69 09/03/2018 06:33 AM  
 Glucose (POC) 80 09/02/2018 09:07 PM  
 Glucose (POC) 170 (H) 09/02/2018 03:45 PM  
 Glucose (POC) 200 (H) 09/02/2018 11:06 AM  
 
Lab Results Component Value Date/Time Color YELLOW/STRAW 08/27/2018 04:37 AM  
 Appearance CLEAR 08/27/2018 04:37 AM  
 Specific gravity 1.019 08/27/2018 04:37 AM  
 pH (UA) 5.0 08/27/2018 04:37 AM  
 Protein NEGATIVE  08/27/2018 04:37 AM  
 Glucose NEGATIVE  08/27/2018 04:37 AM  
 Ketone TRACE (A) 08/27/2018 04:37 AM  
 Bilirubin NEGATIVE  08/27/2018 04:37 AM  
 Urobilinogen 0.2 08/27/2018 04:37 AM  
 Nitrites NEGATIVE  08/27/2018 04:37 AM  
 Leukocyte Esterase NEGATIVE  08/27/2018 04:37 AM  
 Epithelial cells FEW 08/27/2018 04:37 AM  
 Bacteria NEGATIVE  08/27/2018 04:37 AM  
 WBC 0-4 08/27/2018 04:37 AM  
 RBC 0-5 08/27/2018 04:37 AM  
 
Medications Reviewed:  
 
Current Facility-Administered Medications Medication Dose Route Frequency  insulin glargine (LANTUS) injection 5 Units  5 Units SubCUTAneous DAILY  pantoprazole (PROTONIX) tablet 40 mg  40 mg Oral ACB&D  
 lidocaine (LIDODERM) 5 % patch 1 Patch  1 Patch TransDERmal Q24H  
 oxyCODONE IR (ROXICODONE) tablet 5 mg  5 mg Oral Q8H PRN  
 albuterol-ipratropium (DUO-NEB) 2.5 MG-0.5 MG/3 ML  3 mL Nebulization Q6H PRN  polyethylene glycol (MIRALAX) packet 17 g  17 g Oral DAILY  0.9% sodium chloride infusion 250 mL  250 mL IntraVENous PRN  
 0.9% sodium chloride infusion 250 mL  250 mL IntraVENous PRN  
 azelastine (ASTELIN) 137mcg/spray nasal spray  2 Spray Both Nostrils BID  dorzolamide (TRUSOPT) 2 % ophthalmic solution 1 Drop  1 Drop Both Eyes TID  diazePAM (VALIUM) tablet 2 mg  2 mg Oral QHS PRN  
 ferrous sulfate tablet 325 mg  1 Tab Oral ACB  latanoprost (XALATAN) 0.005 % ophthalmic solution 1 Drop  1 Drop Both Eyes QHS  losartan (COZAAR) tablet 50 mg  50 mg Oral DAILY  timolol (TIMOPTIC) 0.5 % ophthalmic solution 1 Drop  1 Drop Both Eyes DAILY  sodium chloride (NS) flush 5-10 mL  5-10 mL IntraVENous Q8H  
 sodium chloride (NS) flush 5-10 mL  5-10 mL IntraVENous PRN  
  ondansetron (ZOFRAN) injection 4 mg  4 mg IntraVENous Q4H PRN  
 insulin lispro (HUMALOG) injection   SubCUTAneous AC&HS  
 glucose chewable tablet 16 g  4 Tab Oral PRN  
 dextrose (D50W) injection syrg 12.5-25 g  12.5-25 g IntraVENous PRN  
 glucagon (GLUCAGEN) injection 1 mg  1 mg IntraMUSCular PRN  
 
______________________________________________________________________ EXPECTED LENGTH OF STAY: 3d 2h 
ACTUAL LENGTH OF STAY:          7 Sergo Witt MD

## 2018-09-03 NOTE — PROGRESS NOTES
Hematology-Oncology Progress Note Peggy Slade 1940 
[id number] 
9/3/2018 Follow-up for: pancreatic carcinoma? [x]        Chart notes since last visit reviewed [x]        Medications reviewed for allergies and interactions Case discussed with the following:         []            
               []        Nursing Staff  
                                                                      []        Pathologist 
                                                                      []        FAMILY Subjective:  
 
Spoke with patient who complains of: No new problems reported. .She thinks she might be going to Rehab Objective:  
 
Patient Vitals for the past 24 hrs: 
 BP Temp Pulse Resp SpO2  
09/03/18 0804 149/75 97.8 °F (36.6 °C) 66 16 99 % 09/03/18 0224 149/75 97.7 °F (36.5 °C) 66 16 96 % 09/02/18 2251 - - - - 97 % 09/02/18 2011 143/64 98.9 °F (37.2 °C) 65 16 98 % 09/02/18 1409 129/89 97.9 °F (36.6 °C) 66 16 99 % 09/02/18 1351 - - - - 98 % Gen: awake, NAD 
CVS: regular Lungs no w/r/r Abd: +BS, soft, ND, NT Ext no pitting edema Neuro: awake, alert, responds appropriately to questions and commands but very talkative Assessment and Plan 1. Pancreatic Mass. .. Repeat biopsy pending 9/2/2018.  s/p egd and biopsy 8/28 that  path is negative 2. Anemia. . Secondary to presumed gi bleeding, hgb is up to  9/1 .decreased to 8.3 on 9/1 3. Deconditioning. .. Physical therapy planned She has likely pancreatic cancer with anemia secondary to GI bleed We are awaiting pathology We will transfuse as needed. I have given contact info if she is stable for discharge to f/u next week with Judah Sargent MD

## 2018-09-03 NOTE — PROGRESS NOTES
GI PROGRESS NOTE 
 
NAME:             Roxane Smalls :              1940 MRN:              [id number] Admit Date:     2018 Todays Date:  9/3/2018 Subjective: No specific complaints. Taking liquid diet. Medications-reviewed Current Facility-Administered Medications Medication Dose Route Frequency  lidocaine (LIDODERM) 5 % patch 1 Patch  1 Patch TransDERmal Q24H  
 oxyCODONE IR (ROXICODONE) tablet 5 mg  5 mg Oral Q8H PRN  
 insulin glargine (LANTUS) injection 8 Units  8 Units SubCUTAneous DAILY  albuterol-ipratropium (DUO-NEB) 2.5 MG-0.5 MG/3 ML  3 mL Nebulization Q6H PRN  polyethylene glycol (MIRALAX) packet 17 g  17 g Oral DAILY  0.9% sodium chloride infusion 250 mL  250 mL IntraVENous PRN  
 0.9% sodium chloride infusion 250 mL  250 mL IntraVENous PRN  pantoprazole (PROTONIX) 40 mg in sodium chloride 0.9% 10 mL injection  40 mg IntraVENous Q12H  
 azelastine (ASTELIN) 137mcg/spray nasal spray  2 Spray Both Nostrils BID  dorzolamide (TRUSOPT) 2 % ophthalmic solution 1 Drop  1 Drop Both Eyes TID  diazePAM (VALIUM) tablet 2 mg  2 mg Oral QHS PRN  
 ferrous sulfate tablet 325 mg  1 Tab Oral ACB  latanoprost (XALATAN) 0.005 % ophthalmic solution 1 Drop  1 Drop Both Eyes QHS  losartan (COZAAR) tablet 50 mg  50 mg Oral DAILY  timolol (TIMOPTIC) 0.5 % ophthalmic solution 1 Drop  1 Drop Both Eyes DAILY  sodium chloride (NS) flush 5-10 mL  5-10 mL IntraVENous Q8H  
 sodium chloride (NS) flush 5-10 mL  5-10 mL IntraVENous PRN  
 ondansetron (ZOFRAN) injection 4 mg  4 mg IntraVENous Q4H PRN  
 insulin lispro (HUMALOG) injection   SubCUTAneous AC&HS  
 glucose chewable tablet 16 g  4 Tab Oral PRN  
 dextrose (D50W) injection syrg 12.5-25 g  12.5-25 g IntraVENous PRN  
 glucagon (GLUCAGEN) injection 1 mg  1 mg IntraMUSCular PRN Objective:  
 
Patient Vitals for the past 8 hrs: 
 BP Temp Pulse Resp SpO2 09/03/18 0224 149/75 97.7 °F (36.5 °C) 66 16 96 % EXAM:   
 NEURO-a&o HEENT-wnl LUNGS-clear COR-regular rate and rhythym ABD-soft , no tenderness, no rebound, good bs LABS: 
Recent Labs  
   09/03/18 0200 09/02/18 
 0333 WBC  5.5  4.2 HGB  9.3*  8.3* HCT  29.5*  25.7*  
PLT  155  132* Recent Labs  
   09/03/18 0200 09/02/18 0333  09/01/18 
 9999 NA  143  144  140  
K  3.8  3.5  3.5 CL  110*  110*  110* CO2  26  22  23 BUN  5*  5*  7 CREA  0.66  0.56  0.63 GLU  76  115*  148* CA  8.2*  8.1*  7.9*  
MG   --   1.8   --   
 
Recent Labs  
   09/03/18 0200 09/02/18 0333 09/01/18 
 0339 SGOT  50*  38*  37  
AP  218*  181*  189* TBILI  0.9  0.6  0.5 TP  6.1*  5.5*  5.6* ALB  3.0*  2.7*  2.7*  
GLOB  3.1  2.8  2.9 ALT  91*  87*  99* Assessment: 1. Pancreatic mass---looks like carcinoma. Metastases present 2. Duodenal obstruction secondary to 1. 
3. Anemia--acute blood loss, hgb down some today Principal Problem: 
  Acute upper GI bleed (8/27/2018) Plan: 1. FNA path results won't be back until Tuesday 2. Will try diabetic diet 3. Serial CBC

## 2018-09-04 VITALS
WEIGHT: 172.5 LBS | DIASTOLIC BLOOD PRESSURE: 70 MMHG | TEMPERATURE: 97.6 F | RESPIRATION RATE: 18 BRPM | HEART RATE: 65 BPM | BODY MASS INDEX: 30.56 KG/M2 | SYSTOLIC BLOOD PRESSURE: 154 MMHG | HEIGHT: 63 IN | OXYGEN SATURATION: 100 %

## 2018-09-04 PROBLEM — K92.2 ACUTE UPPER GI BLEED: Status: RESOLVED | Noted: 2018-08-27 | Resolved: 2018-09-04

## 2018-09-04 LAB
ALBUMIN SERPL-MCNC: 2.7 G/DL (ref 3.5–5)
ALBUMIN/GLOB SERPL: 0.8 {RATIO} (ref 1.1–2.2)
ALP SERPL-CCNC: 211 U/L (ref 45–117)
ALT SERPL-CCNC: 75 U/L (ref 12–78)
ANION GAP SERPL CALC-SCNC: 8 MMOL/L (ref 5–15)
AST SERPL-CCNC: 39 U/L (ref 15–37)
BILIRUB SERPL-MCNC: 0.8 MG/DL (ref 0.2–1)
BUN SERPL-MCNC: 4 MG/DL (ref 6–20)
BUN/CREAT SERPL: 5 (ref 12–20)
CALCIUM SERPL-MCNC: 8.6 MG/DL (ref 8.5–10.1)
CHLORIDE SERPL-SCNC: 111 MMOL/L (ref 97–108)
CO2 SERPL-SCNC: 24 MMOL/L (ref 21–32)
CREAT SERPL-MCNC: 0.74 MG/DL (ref 0.55–1.02)
GLOBULIN SER CALC-MCNC: 3.3 G/DL (ref 2–4)
GLUCOSE BLD STRIP.AUTO-MCNC: 113 MG/DL (ref 65–100)
GLUCOSE BLD STRIP.AUTO-MCNC: 85 MG/DL (ref 65–100)
GLUCOSE SERPL-MCNC: 87 MG/DL (ref 65–100)
POTASSIUM SERPL-SCNC: 3.7 MMOL/L (ref 3.5–5.1)
PROT SERPL-MCNC: 6 G/DL (ref 6.4–8.2)
SERVICE CMNT-IMP: ABNORMAL
SERVICE CMNT-IMP: NORMAL
SODIUM SERPL-SCNC: 143 MMOL/L (ref 136–145)

## 2018-09-04 PROCEDURE — 74011250637 HC RX REV CODE- 250/637: Performed by: INTERNAL MEDICINE

## 2018-09-04 PROCEDURE — 80053 COMPREHEN METABOLIC PANEL: CPT | Performed by: INTERNAL MEDICINE

## 2018-09-04 PROCEDURE — 74011636637 HC RX REV CODE- 636/637: Performed by: INTERNAL MEDICINE

## 2018-09-04 PROCEDURE — 82962 GLUCOSE BLOOD TEST: CPT

## 2018-09-04 PROCEDURE — 74011000250 HC RX REV CODE- 250: Performed by: INTERNAL MEDICINE

## 2018-09-04 PROCEDURE — 36415 COLL VENOUS BLD VENIPUNCTURE: CPT | Performed by: INTERNAL MEDICINE

## 2018-09-04 RX ADMIN — Medication 10 ML: at 06:41

## 2018-09-04 RX ADMIN — DORZOLAMIDE HCL 1 DROP: 20 SOLUTION/ DROPS OPHTHALMIC at 08:52

## 2018-09-04 RX ADMIN — PANTOPRAZOLE SODIUM 40 MG: 40 TABLET, DELAYED RELEASE ORAL at 06:41

## 2018-09-04 RX ADMIN — AZELASTINE HYDROCHLORIDE 2 SPRAY: 137 SPRAY, METERED NASAL at 08:53

## 2018-09-04 RX ADMIN — LOSARTAN POTASSIUM 50 MG: 50 TABLET ORAL at 08:41

## 2018-09-04 RX ADMIN — TIMOLOL MALEATE 1 DROP: 5 SOLUTION OPHTHALMIC at 08:53

## 2018-09-04 RX ADMIN — POLYETHYLENE GLYCOL 3350 17 G: 17 POWDER, FOR SOLUTION ORAL at 08:40

## 2018-09-04 RX ADMIN — INSULIN GLARGINE 5 UNITS: 100 INJECTION, SOLUTION SUBCUTANEOUS at 09:04

## 2018-09-04 RX ADMIN — FERROUS SULFATE TAB 325 MG (65 MG ELEMENTAL FE) 325 MG: 325 (65 FE) TAB at 06:41

## 2018-09-04 NOTE — PROGRESS NOTES
[facility] 
[address] [facility], [address] GI PROGRESS NOTE Qasim Zuniga, [address] office [phone] NP in-hospital cell phone M-F until 4:30 After 5pm or on weekends, please call  for physician on call NAME: Shane Frye :  1940 MRN:  [id number] Subjective: She reports that she is eating everything she wants to. She continues to have generalized abdominal soreness. Objective: VITALS:  
Last 24hrs VS reviewed since prior progress note. Most recent are: 
Visit Vitals  /70 (BP 1 Location: Right arm, BP Patient Position: At rest)  Pulse 65  Temp 97.6 °F (36.4 °C)  Resp 18  Ht 5' 3\" (1.6 m)  Wt 78.2 kg (172 lb 8 oz)  SpO2 100%  BMI 30.56 kg/m2 PHYSICAL EXAM: 
General: Cooperative, no acute distress   
Neurologic:  Alert and oriented X 3. HEENT: EOMI, no scleral icterus Lungs:  CTA bilaterally. No wheezing Heart:  S1 S2, regular rhythm, no murmur Abdomen: Soft, non-distended, generalized tenderness. +Bowel sounds Extremities: No edema Psych:   Poor insight. Not anxious or agitated. Lab Data Reviewed:  
 
Recent Results (from the past 24 hour(s)) GLUCOSE, POC Collection Time: 18 11:40 AM  
Result Value Ref Range Glucose (POC) 148 (H) 65 - 100 mg/dL Performed by Shayla Salas GLUCOSE, POC Collection Time: 18  3:58 PM  
Result Value Ref Range Glucose (POC) 173 (H) 65 - 100 mg/dL Performed by Shayla Salas GLUCOSE, POC Collection Time: 18  9:50 PM  
Result Value Ref Range Glucose (POC) 115 (H) 65 - 100 mg/dL Performed by Luis Duncan GLUCOSE, POC Collection Time: 18  6:38 AM  
Result Value Ref Range Glucose (POC) 85 65 - 100 mg/dL Performed by Tea Cobian METABOLIC PANEL, COMPREHENSIVE Collection Time: 18  6:48 AM  
Result Value Ref Range  Sodium 143 136 - 145 mmol/L  
 Potassium 3.7 3.5 - 5.1 mmol/L Chloride 111 (H) 97 - 108 mmol/L  
 CO2 24 21 - 32 mmol/L Anion gap 8 5 - 15 mmol/L Glucose 87 65 - 100 mg/dL BUN 4 (L) 6 - 20 MG/DL Creatinine 0.74 0.55 - 1.02 MG/DL  
 BUN/Creatinine ratio 5 (L) 12 - 20 GFR est AA >60 >60 ml/min/1.73m2 GFR est non-AA >60 >60 ml/min/1.73m2 Calcium 8.6 8.5 - 10.1 MG/DL Bilirubin, total 0.8 0.2 - 1.0 MG/DL  
 ALT (SGPT) 75 12 - 78 U/L  
 AST (SGOT) 39 (H) 15 - 37 U/L Alk. phosphatase 211 (H) 45 - 117 U/L Protein, total 6.0 (L) 6.4 - 8.2 g/dL Albumin 2.7 (L) 3.5 - 5.0 g/dL Globulin 3.3 2.0 - 4.0 g/dL A-G Ratio 0.8 (L) 1.1 - 2.2 Assessment:  
· GI bleed: anemia with hemoccult positive stool. Hgb 9.3 Status post 3 units PRBCs; EGD 8/28 duodenal ulcer with prominent duodenal folds with marked narrowing,unable to pass scope, concern that pancreatic head lesion is infiltrating duodenum; biopsy non diagnostic, normal small bowel · Suspected metastatic pancreatic carcinoma: CT abdomen/pelvis without contrast (8/27/18); EUS 8/31/18 2x3 cm pancreas head tumor/mass, hypoechoic, abutting the portal vein, and invading the distal CBD; FNB - preliminary reading showed malignant cells · Duodenal obstruction · Elevated LFTs: improving AST: 39, ALT: 75, alkaline phosphatase: 211, total bilirubin: normal.  
 
Patient Active Problem List  
Diagnosis Code  Glaucoma H40.9  GERD (gastroesophageal reflux disease) K21.9  
 Uncontrolled type 2 diabetes mellitus with diabetic neuropathy, with long-term current use of insulin (HCC) E11.40, Z79.4, E11.65  
 Essential hypertension, benign I10  
 Hyperlipidemia E78.5  Anxiety F41.9  Osteoarthritis M19.90  Cardiac pacemaker in situ Z95.0  Diabetes ([address] E11.9 Plan: · Diet as tolerated · Follow pathology · Continue PPI 
· Oncology following · Continue supportive measures Signed By: Emily Tyson NP   
 9/4/2018  1:24 PM

## 2018-09-04 NOTE — PROGRESS NOTES
CM reviewed chart and received discharge orders. CM called [facility] and confirmed that they had a bed available and could accept pt today. CM faxed AVS and discharge summary to [facility]. Envelope containing MARs, Kardex, AVS, discharge summary will be sent with pt. Nurse will call report. CM called and left message for daughter, waiting to hear back before setting up transport. Will likely arrange Medicaid wheelchair Leodis Blazer to transport. Berkley Landau, BSW, ACM Daughter is in agreement with discharge plan and would like transport to be arranged. CM called [employer], and arranged a wheelchair van. Reference number [id number].  
Berkley Landau, BSW, ACM

## 2018-09-04 NOTE — PROGRESS NOTES
TRANSFER - OUT REPORT: 
 
Verbal report given to [employer] (name) on Simon Bates  being transferred to [facility] for routine post - op Report consisted of patients Situation, Background, Assessment and  
Recommendations(SBAR). Information from the following report(s) SBAR was reviewed with the receiving nurse. Lines:  
Peripheral IV 09/02/18 Left Wrist (Active) Site Assessment Clean, dry, & intact 9/4/2018  8:00 AM  
Phlebitis Assessment 0 9/4/2018  8:00 AM  
Infiltration Assessment 0 9/4/2018  8:00 AM  
Dressing Status Clean, dry, & intact 9/4/2018  8:00 AM  
Dressing Type Transparent 9/4/2018  8:00 AM  
Hub Color/Line Status Capped 9/4/2018  8:00 AM  
Action Taken Open ports on tubing capped 9/3/2018  7:19 PM  
Alcohol Cap Used Yes 9/4/2018  8:00 AM  
  
 
Opportunity for questions and clarification was provided. Patient transported with: 
 Patients medications from home Eye drops and nasal spray

## 2018-09-04 NOTE — PROGRESS NOTES
Bedside shift change report given to Alejandro Taylor (oncoming nurse) by Kishan Milligan (offgoing nurse). Report included the following information SBAR, Kardex and MAR.

## 2018-09-04 NOTE — DISCHARGE INSTRUCTIONS
Discharge SNF/Rehab Instructions/LTAC       PATIENT ID: Marielle Blackmon  MRN: [id number]   YOB: 1940    DATE OF ADMISSION: 8/27/2018  3:29 AM    DATE OF DISCHARGE: 9/4/2018    PRIMARY CARE PROVIDER: Joyce Santiago MD       ATTENDING PHYSICIAN: Nohelia Razo MD  DISCHARGING PROVIDER: Nohelia Razo MD     To contact this individual call [phone] and ask the  to page. If unavailable ask to be transferred the Adult Hospitalist Department. CONSULTATIONS: IP CONSULT TO GASTROENTEROLOGY  IP CONSULT TO HEMATOLOGY    PROCEDURES/SURGERIES: Procedure(s):  ENDOSCOPIC ULTRASOUND (EUS)  ESOPHAGOGASTRODUODENOSCOPY (EGD)  FINE NEEDLE ASPIRATION    ADMITTING DIAGNOSES & HOSPITAL COURSE:   [de-identified] yo woman with DM2, dyslipidemia, CAD s/p CABG, SSS s/p PPM presented to the ED from home on 8/27/18 with worsening abdominal pain, nausea, vomiting. CT a/p in the ED showed findings worrisome for metastatic pancreatic adenocarcinoma. She was admitted with significant anemia and heme-positive stool likely due to UGIB.        Assessment & Plan:      Acute blood loss anemia (POA)   - s/p 3 units PRBC since admission  - likely due to metastatic pancreatic CA exacerbated by GI blood loss   - iron, B12, folate WNL      GI bleed (POA) - FOBT +  -  po PPI BID  - s/p EGD 8/28; pathology benign  - CA 19-9 and CEA WNL  - hold ASA and apixaban  - s/p EUS 8/31 duodenal obstruction in second portion from extrinsic compression, 2x3cm pancreatic head mass invading distal CBD with FNB; prelim path malignant cells; final path pending     Prerenal azotemia (POA) - likely due to GI bleed      DM2 with labile glycemia - last A1c 7.8 (7/2018), SSI  - holding home metformin, linaclotide, pioglitazone, glipizide  - hyperglycemic initially but now hypoglycemic  - resumed home insulin and adjust dose prn      Elevated LFTs - RUQ US 8/27 Pancreatic head mass and biliary dilation. Trace gallbladder sludge without stones.  Uniform liver.  - trending down      Suspected metastatic pancreatic CA - Oncology following  - 8/28 pathology benign  - CA 19-9 and CEA WNL  - s/p EUS 8/31; final path pending but prelim malignant cells      Multiple b/l lung nodules (POA) - seen on CXR  - CTA chest 8/27 Multiple pulmonary nodules, likely metastases.      SSS s/p PPM - hold apixaban and ASA for now; resume when appropriate      DLD - hold home statin due to transaminitis      HTN - controlled on home meds; hold home HCTZ inpatient      CAD s/p CABG - hold ASA (due to GIB) and statin (due to transaminitis)  - resumed home ARB  - no BB on PTA med list      Mild hypokalemia - replete prn  Hypomagnesemia - replete prn     RUE edema - venous duplex 9/2 negative DVT  - keep elevated     Persistent LUQ/chest wall pain (POA) - likely due to pancreatic mass  - XR ribs 9/2 no fx              PENDING TEST RESULTS:   At the time of discharge the following test results are still pending:     FOLLOW UP APPOINTMENTS:    Follow-up Information     Follow up With Details Comments Contact [facility]   [id number] Bernabe Candelario MD In 1 week  [id number] [id number] [phone]             ADDITIONAL CARE RECOMMENDATIONS:     DIET: Regular Diet and Cardiac Diet    TUBE FEEDING INSTRUCTIONS:     OXYGEN / BiPAP SETTINGS:     ACTIVITY: Activity as tolerated    WOUND CARE:     EQUIPMENT needed:       DISCHARGE MEDICATIONS:   See Medication Reconciliation Form      NOTIFY YOUR PHYSICIAN FOR ANY OF THE FOLLOWING:   Fever over 101 degrees for 24 hours. Chest pain, shortness of breath, fever, chills, nausea, vomiting, diarrhea, change in mentation, falling, weakness, bleeding. Severe pain or pain not relieved by medications. Or, any other signs or symptoms that you may have questions about.     DISPOSITION:    Home With:   OT  PT  [facility]      x SNF/Inpatient Rehab/LTAC    Independent/assisted living    Hospice    Other:       PATIENT CONDITION AT DISCHARGE:     Functional status   x Poor     Deconditioned     Independent      Cognition    x Lucid     Forgetful     Dementia      Catheters/lines (plus indication)    Corral     PICC     PEG    x None      Code status   x  Full code     DNR      PHYSICAL EXAMINATION AT DISCHARGE:   Refer to Progress Note      CHRONIC MEDICAL DIAGNOSES:  Problem List as of 9/4/2018  Date Reviewed: 9/4/2018          Codes Class Noted - Resolved    Diabetes ([facility] [address] ICD-10-CM: E11.9  ICD-9-CM: 250.00  Unknown - Present        Cardiac pacemaker in situ ICD-10-CM: Z95.0  ICD-9-CM: V45.01  11/24/2010 - Present        Osteoarthritis ICD-10-CM: M19.90  ICD-9-CM: 715.90  11/12/2009 - Present    Overview Signed 11/12/2009  7:42 AM by Lillie Jarvis     Left shoulder and left knee             Glaucoma ICD-10-CM: H40.9  ICD-9-CM: 365.9  11/11/2009 - Present        GERD (gastroesophageal reflux disease) ICD-10-CM: K21.9  ICD-9-CM: 530.81  11/11/2009 - Present        Uncontrolled type 2 diabetes mellitus with diabetic neuropathy, with long-term current use of insulin (HCC) ICD-10-CM: E11.40, Z79.4, E11.65  ICD-9-CM: 250.62, 357.2, V58.67  11/11/2009 - Present        Essential hypertension, benign ICD-10-CM: I10  ICD-9-CM: 401.1  11/11/2009 - Present        Hyperlipidemia ICD-10-CM: E78.5  ICD-9-CM: 272.4  11/11/2009 - Present        Anxiety ICD-10-CM: F41.9  ICD-9-CM: 300.00  11/11/2009 - Present        * (Principal)RESOLVED: Acute upper GI bleed ICD-10-CM: K92.2  ICD-9-CM: 578.9  8/27/2018 - 9/4/2018                CDMP Checked:   Yes x     PROBLEM LIST Updated:  Yes x         Signed:   Dominga Duran MD  9/4/2018  8:29 AM

## 2018-09-04 NOTE — DISCHARGE SUMMARY
Discharge Summary       PATIENT ID: Deshawn Spencer  MRN: [id number]   YOB: 1940    DATE OF ADMISSION: 8/27/2018  3:29 AM    DATE OF DISCHARGE: 9/4/18   PRIMARY CARE PROVIDER: Elisabeth Corbett MD     ATTENDING PHYSICIAN: Romana Ammon  DISCHARGING PROVIDER: Russel Mireles MD    To contact this individual call [phone] and ask the  to page. If unavailable ask to be transferred the Adult Hospitalist Department. CONSULTATIONS: IP CONSULT TO GASTROENTEROLOGY  IP CONSULT TO HEMATOLOGY    PROCEDURES/SURGERIES: Procedure(s):  ENDOSCOPIC ULTRASOUND (EUS)  ESOPHAGOGASTRODUODENOSCOPY (EGD)  FINE NEEDLE ASPIRATION    ADMITTING DIAGNOSES & HOSPITAL COURSE:   [de-identified] yo woman with DM2, dyslipidemia, CAD s/p CABG, SSS s/p PPM presented to the ED from home on 8/27/18 with worsening abdominal pain, nausea, vomiting. CT a/p in the ED showed findings worrisome for metastatic pancreatic adenocarcinoma. She was admitted with significant anemia and heme-positive stool likely due to UGIB.        Assessment & Plan:      Acute blood loss anemia (POA)   - s/p 3 units PRBC since admission  - likely due to metastatic pancreatic CA exacerbated by GI blood loss   - iron, B12, folate WNL      GI bleed (POA) - FOBT +  -  po PPI BID  - s/p EGD 8/28; pathology benign  - CA 19-9 and CEA WNL  - hold ASA and apixaban  - s/p EUS 8/31 duodenal obstruction in second portion from extrinsic compression, 2x3cm pancreatic head mass invading distal CBD with FNB; prelim path malignant cells; final path pending     Prerenal azotemia (POA) - likely due to GI bleed      DM2 with labile glycemia - last A1c 7.8 (7/2018), SSI  - holding home metformin, linaclotide, pioglitazone, glipizide  - hyperglycemic initially but now hypoglycemic  - resumed home insulin and adjust dose prn      Elevated LFTs - RUQ US 8/27 Pancreatic head mass and biliary dilation. Trace gallbladder sludge without stones. Uniform liver.   - trending down      Suspected metastatic pancreatic CA - Oncology following  - 8/28 pathology benign  - CA 19-9 and CEA WNL  - s/p EUS 8/31; final path pending but prelim malignant cells      Multiple b/l lung nodules (POA) - seen on CXR  - CTA chest 8/27 Multiple pulmonary nodules, likely metastases.      SSS s/p PPM - hold apixaban and ASA for now; resume when appropriate      DLD - hold home statin due to transaminitis      HTN - controlled on home meds; hold home HCTZ inpatient      CAD s/p CABG - hold ASA (due to GIB) and statin (due to transaminitis)  - resumed home ARB  - no BB on PTA med list      Mild hypokalemia - replete prn  Hypomagnesemia - replete prn     RUE edema - venous duplex 9/2 negative DVT  - keep elevated     Persistent LUQ/chest wall pain (POA) - likely due to pancreatic mass  - XR ribs 9/2 no fx              PENDING TEST RESULTS:   At the time of discharge the following test results are still pending:     FOLLOW UP APPOINTMENTS:    Follow-up Information     Follow up With Details Comments Contact Info    [facility] [place]   [address] [address] Bernabe Hurtado MD In 1 week  [address] [address] [place] [address]  [phone]             ADDITIONAL CARE RECOMMENDATIONS:     DIET: Regular Diet and Cardiac Diet    ACTIVITY: Activity as tolerated    WOUND CARE:     EQUIPMENT needed:       DISCHARGE MEDICATIONS:  Current Discharge Medication List      CONTINUE these medications which have CHANGED    Details   insulin detemir U-100 (LEVEMIR FLEXTOUCH) 100 unit/mL (3 mL) inpn 5 Units by SubCUTAneous route nightly. Inject 18 units daily  Qty: 5 Pen, Refills: 3         CONTINUE these medications which have NOT CHANGED    Details   linaclotide (LINZESS) 145 mcg cap capsule Take 145 mcg by mouth daily. acetaminophen-codeine (TYLENOL #3) 300-30 mg per tablet daily as needed.       sucralfate (CARAFATE) 1 gram tablet 1 g two (2) times a day. cholecalciferol (VITAMIN D3) 400 unit tab tablet Take 400 Units by mouth two (2) times a day. ferrous sulfate (IRON) 325 mg (65 mg iron) tablet Take  by mouth Daily (before breakfast). timolol (TIMOPTIC) 0.5 % ophthalmic solution Administer 1 Drop to both eyes daily. simvastatin (ZOCOR) 20 mg tablet Take 1 Tab by mouth nightly. Qty: 30 Tab, Refills: 3      losartan (COZAAR) 50 mg tablet Take 1 Tab by mouth daily. Qty: 30 Tab, Refills: 5    Associated Diagnoses: Cough; Essential hypertension, benign      brinzolamide (AZOPT) 1 % ophthalmic suspension Administer 1 Drop to both eyes three (3) times daily. latanoprost (XALATAN) 0.005 % ophthalmic solution Administer 1 Drop to both eyes nightly. hydrochlorothiazide (HYDRODIURIL) 25 mg tablet Take 25 mg by mouth daily. Azelastine (ASTEPRO) 0.15 % (205.5 mcg) nasal spray 2 Sprays by Both Nostrils route two (2) times a day. Qty: 1 Bottle, Refills: 11    Associated Diagnoses: Rhinitis      montelukast (SINGULAIR) 10 mg tablet Take 10 mg by mouth daily. lactulose (ENULOSE) 10 gram/15 mL solution Take  by mouth three (3) times daily. esomeprazole (NEXIUM) 40 mg capsule Take  by mouth daily.          STOP taking these medications       albuterol (PROVENTIL HFA, VENTOLIN HFA, PROAIR HFA) 90 mcg/actuation inhaler Comments:   Reason for Stopping:         metFORMIN (GLUCOPHAGE) 500 mg tablet Comments:   Reason for Stopping:         pioglitazone (ACTOS) 30 mg tablet Comments:   Reason for Stopping:         fluticasone (FLONASE) 50 mcg/actuation nasal spray Comments:   Reason for Stopping:         MYRBETRIQ 50 mg ER tablet Comments:   Reason for Stopping:         ELIQUIS 5 mg tablet Comments:   Reason for Stopping:         diazepam (VALIUM) 2 mg tablet Comments:   Reason for Stopping:         aspirin delayed-release 81 mg tablet Comments:   Reason for Stopping:         glipiZIDE SR (GLUCOTROL XL) 10 mg CR tablet Comments:   Reason for Stopping:         dicyclomine (BENTYL) 20 mg tablet Comments:   Reason for Stopping:         metFORMIN (GLUCOPHAGE) 1,000 mg tablet Comments:   Reason for Stopping:         albuterol (PROVENTIL HFA, VENTOLIN HFA) 90 mcg/actuation inhaler Comments:   Reason for Stopping:         methenamine hippurate (HIPREX) 1 gram tablet Comments:   Reason for Stopping:                 NOTIFY YOUR PHYSICIAN FOR ANY OF THE FOLLOWING:   Fever over 101 degrees for 24 hours. Chest pain, shortness of breath, fever, chills, nausea, vomiting, diarrhea, change in mentation, falling, weakness, bleeding. Severe pain or pain not relieved by medications. Or, any other signs or symptoms that you may have questions about.     DISPOSITION:    Home With:   OT  PT  HH  RN      x Long term SNF/Inpatient Rehab    Independent/assisted living    Hospice    Other:       PATIENT CONDITION AT DISCHARGE:     Functional status   x Poor     Deconditioned     Independent      Cognition   x  Lucid     Forgetful     Dementia      Catheters/lines (plus indication)    Corral     PICC     PEG    x None      Code status   x  Full code     DNR      PHYSICAL EXAMINATION AT DISCHARGE:  Gen: awake, NAD, obese  CVS: regular rhythm, normal rate, no m/r/g appreciated, RUE and b/l LE peripheral edema  Lungs: CTAB anteriorly, no w/r/r  Abd: +BS, soft, ND, NT  MSK: NICOLAS  Neuro: awake, alert, responds appropriately       CHRONIC MEDICAL DIAGNOSES:  Problem List as of 9/4/2018  Date Reviewed: 9/4/2018          Codes Class Noted - Resolved    Diabetes ([facility] [address] ICD-10-CM: E11.9  ICD-9-CM: 250.00  Unknown - Present        Cardiac pacemaker in situ ICD-10-CM: Z95.0  ICD-9-CM: V45.01  11/24/2010 - Present        Osteoarthritis ICD-10-CM: M19.90  ICD-9-CM: 715.90  11/12/2009 - Present    Overview Signed 11/12/2009  7:42 AM by Pepe Coronado     Left shoulder and left knee             Glaucoma ICD-10-CM: H40.9  ICD-9-CM: 365.9  11/11/2009 - Present        GERD (gastroesophageal reflux disease) ICD-10-CM: K21.9  ICD-9-CM: 530.81  11/11/2009 - Present        Uncontrolled type 2 diabetes mellitus with diabetic neuropathy, with long-term current use of insulin (HCC) ICD-10-CM: E11.40, Z79.4, E11.65  ICD-9-CM: 250.62, 357.2, V58.67  11/11/2009 - Present        Essential hypertension, benign ICD-10-CM: I10  ICD-9-CM: 401.1  11/11/2009 - Present        Hyperlipidemia ICD-10-CM: E78.5  ICD-9-CM: 272.4  11/11/2009 - Present        Anxiety ICD-10-CM: F41.9  ICD-9-CM: 300.00  11/11/2009 - Present        * (Principal)RESOLVED: Acute upper GI bleed ICD-10-CM: K92.2  ICD-9-CM: 578.9  8/27/2018 - 9/4/2018              Greater than 30  minutes were spent with the patient on counseling and coordination of care    Signed:   John Smith MD  9/4/2018  8:32 AM

## 2018-09-04 NOTE — PROGRESS NOTES
Hematology-Oncology Progress Note [place] Few 1940 
[id number] 
9/4/2018 Follow-up for: pancreatic carcinoma? [x]        Chart notes since last visit reviewed [x]        Medications reviewed for allergies and interactions Case discussed with the following:         []            
               []        Nursing Staff  
                                                                      []        Pathologist 
                                                                      []        FAMILY Subjective:  
 
Spoke with patient who complains of: No new problems reported. .She thinks she might be going to Rehab Objective:  
 
Patient Vitals for the past 24 hrs: 
 BP Temp Pulse Resp SpO2  
09/04/18 0810 154/70 97.6 °F (36.4 °C) 65 18 100 % 09/04/18 0430 144/73 97.7 °F (36.5 °C) 66 18 98 % 09/03/18 2133 (!) 148/94 97.8 °F (36.6 °C) 98 18 97 % 09/03/18 1452 137/86 98.2 °F (36.8 °C) 65 17 100 % 09/03/18 1421 - - - - 97 % Gen: awake, NAD 
CVS: regular Lungs no w/r/r Abd: +BS, soft, ND, NT Ext no pitting edema Neuro: awake, alert, responds appropriately to questions and commands but very talkative Assessment and Plan 1. Pancreatic Mass. .. Repeat biopsy pending 9/2/2018.  s/p egd and biopsy 8/28 that  path is negative 2. Anemia. . Secondary to presumed gi bleeding, hgb is up to  9/1 .decreased to 8.3 on 9/1 3. Deconditioning. .. Physical therapy planned She has likely pancreatic cancer with anemia secondary to GI bleed We are awaiting pathology Transfuse as needed. She was given contact info if she is stable for discharge to f/u next week with , to discuss Tx options D/w Dr Dionisio Huffman MD

## 2018-09-11 ENCOUNTER — PATIENT OUTREACH (OUTPATIENT)
Dept: CASE MANAGEMENT | Age: 78
End: 2018-09-11

## 2018-09-11 NOTE — PROGRESS NOTES
Community Care Team documentation for patient in [facility]  Initial Follow Up       Patient was admitted to [address] [place] [address] on 8/27 and discharged 9/4 to [facility]. Hospital Discharge diagnosis:  Acute blood loss anemia     RRAT score: 37    Advance Care Planning:  Not on file. PCP : Brooke Sacks, MD     SNF Attending: April Patel MD     Spoke with SNF team.  Ensured patient arrived to SNF safely with admission packet in order. Confirmed pt attended oncology follow up today. Pt is being skilled by PT/OT. Contact guard assist with hygiene, Bed mobility, transfers, lower body ADLs, bathing and toileting. Supervision with upper body ADLs. Min assist required with meal prep and house keeping skills. Ambulating 175 ft with RW and contact guard assist. 1-2 weeks LOS anticipated. Plan to discharge to [address] [place] [address]. Pt currently has Caregivers through [facility] Tues-Sat 10:30AM-4:30PM. Pt's family looking into hiring caregivers throughout the night. [facility] agency: TBD. Community Care Team will follow up weekly with Keith Rich until discharge. Medications were not reconciled and general patient assessment was not completed during this [facility] outreach.

## 2018-09-18 ENCOUNTER — PATIENT OUTREACH (OUTPATIENT)
Dept: CASE MANAGEMENT | Age: 78
End: 2018-09-18

## 2018-09-20 ENCOUNTER — HOME HEALTH ADMISSION (OUTPATIENT)
Dept: HOME HEALTH SERVICES | Facility: HOME HEALTH | Age: 78
End: 2018-09-20
Payer: MEDICARE

## 2018-09-22 ENCOUNTER — HOME CARE VISIT (OUTPATIENT)
Dept: SCHEDULING | Facility: HOME HEALTH | Age: 78
End: 2018-09-22
Payer: MEDICARE

## 2018-09-22 PROCEDURE — 400013 HH SOC

## 2018-09-22 PROCEDURE — G0299 HHS/HOSPICE OF RN EA 15 MIN: HCPCS

## 2018-09-22 PROCEDURE — 3331090001 HH PPS REVENUE CREDIT

## 2018-09-22 PROCEDURE — 3331090002 HH PPS REVENUE DEBIT

## 2018-09-23 VITALS
HEART RATE: 76 BPM | BODY MASS INDEX: 32.44 KG/M2 | SYSTOLIC BLOOD PRESSURE: 130 MMHG | TEMPERATURE: 98.2 F | DIASTOLIC BLOOD PRESSURE: 60 MMHG | WEIGHT: 190 LBS | HEIGHT: 64 IN | OXYGEN SATURATION: 96 % | RESPIRATION RATE: 18 BRPM

## 2018-09-23 PROCEDURE — 3331090001 HH PPS REVENUE CREDIT

## 2018-09-23 PROCEDURE — 3331090002 HH PPS REVENUE DEBIT

## 2018-09-24 ENCOUNTER — HOME CARE VISIT (OUTPATIENT)
Dept: SCHEDULING | Facility: HOME HEALTH | Age: 78
End: 2018-09-24
Payer: MEDICARE

## 2018-09-24 ENCOUNTER — TELEPHONE (OUTPATIENT)
Dept: ENDOCRINOLOGY | Age: 78
End: 2018-09-24

## 2018-09-24 VITALS
RESPIRATION RATE: 17 BRPM | DIASTOLIC BLOOD PRESSURE: 79 MMHG | TEMPERATURE: 98 F | HEART RATE: 70 BPM | SYSTOLIC BLOOD PRESSURE: 131 MMHG | OXYGEN SATURATION: 98 %

## 2018-09-24 VITALS
HEART RATE: 66 BPM | RESPIRATION RATE: 16 BRPM | OXYGEN SATURATION: 98 % | DIASTOLIC BLOOD PRESSURE: 74 MMHG | TEMPERATURE: 98.6 F | SYSTOLIC BLOOD PRESSURE: 130 MMHG

## 2018-09-24 PROCEDURE — 3331090001 HH PPS REVENUE CREDIT

## 2018-09-24 PROCEDURE — G0151 HHCP-SERV OF PT,EA 15 MIN: HCPCS

## 2018-09-24 PROCEDURE — 3331090002 HH PPS REVENUE DEBIT

## 2018-09-24 PROCEDURE — G0300 HHS/HOSPICE OF LPN EA 15 MIN: HCPCS

## 2018-09-24 PROCEDURE — G0152 HHCP-SERV OF OT,EA 15 MIN: HCPCS

## 2018-09-24 NOTE — TELEPHONE ENCOUNTER
Ms. Luz Elena Pedersen daughter called today and she has some information regarding Ms. Luz Elena Pedersen. She said that Ms. Luz Elena Pedersen blood sugar numbers form August 22-29 highest 247 during the day and 1150 at night time. August 12-19 highest starting at 1040 and ending at 1150. Currently Ms. Luz Elena Pedersen is at 191 for day and 245 at night. Ms. Luz Elena Pedersen daughter would like to know what type of Insulin medication she needs to take and would like to talk with the nurse. Thanks Olga Lunsford.

## 2018-09-24 NOTE — TELEPHONE ENCOUNTER
DAUGHTER called and stated that her mom was in the hospital and her insulin dose was changed to Levemir 5 units in the morning and 14 units at night. Daughter would like to know whether or not she should continue taking  total of 19 units daily or should she go back to taking 18 units daily. She was then ask for her lowest and highest reading while taking the 18 and 19 units of the Levemir. She stated that she will call back once she has that information.

## 2018-09-24 NOTE — TELEPHONE ENCOUNTER
----- Message from Carmen Metcalf sent at 9/24/2018  9:36 AM EDT -----  Regarding: /Telephone  Mrs. Fraga pt's daughter is requesting a call back  ASAP in reference to the amount of insulin to the pt should be taking. Best contact number is [phone].

## 2018-09-25 ENCOUNTER — PATIENT OUTREACH (OUTPATIENT)
Dept: CASE MANAGEMENT | Age: 78
End: 2018-09-25

## 2018-09-25 PROCEDURE — 3331090001 HH PPS REVENUE CREDIT

## 2018-09-25 PROCEDURE — 3331090002 HH PPS REVENUE DEBIT

## 2018-09-25 NOTE — PROGRESS NOTES
Community Care Team Documentation for Patient in [facility]  Discharge Note    Patient has been discharged from [facility] ([facility]). See previous [facility] Team notes. PCP : Merline Schmid, MD    Spoke with SNF team. Confirmed pt discharged 9/20 home with [facility]. Patient will continue to have caregivers through [facility] Tues-Sat 10:30AM-4:30PM.  Family arranged 4 additional hours with caregiver at night. PCP follow up on 9/25 at 9:45am with Dr. Sean Paz. Community Care Team will sign off at this time. Medications were not reconciled and general patient assessment was not completed during this skilled nursing facility outreach.

## 2018-09-25 NOTE — TELEPHONE ENCOUNTER
Daughter would like to know if her mom can be seen sooner than Oct 19 for  Hospital f/u appt. Daughter was concerned because of pt's high glucose readings. Daughter stated pt's bs are has been as high as 1040. I then asked if the reading were taking in the hospital or at home. She stated \"at home. \"  She was then made aware that the meter from the pharmacy will not calculate a reading over 600 and if the bs is over 600 the meters will read \"HI. \"  I then encouraged her to check the log book to make sure the number that she was reading was not the time of day. She checked and stated that the 1040 was actually 10:40pm and her bs readings for that was 113.

## 2018-09-25 NOTE — TELEPHONE ENCOUNTER
9/25/2018  11:13 AM        Please call Ms. Ward Home daughter [phone] when you have a moment.           Thanks

## 2018-09-26 ENCOUNTER — HOME CARE VISIT (OUTPATIENT)
Dept: SCHEDULING | Facility: HOME HEALTH | Age: 78
End: 2018-09-26
Payer: MEDICARE

## 2018-09-26 VITALS
TEMPERATURE: 98 F | RESPIRATION RATE: 17 BRPM | OXYGEN SATURATION: 98 % | HEART RATE: 72 BPM | SYSTOLIC BLOOD PRESSURE: 125 MMHG | DIASTOLIC BLOOD PRESSURE: 72 MMHG

## 2018-09-26 VITALS
SYSTOLIC BLOOD PRESSURE: 130 MMHG | OXYGEN SATURATION: 97 % | TEMPERATURE: 97.3 F | HEART RATE: 76 BPM | RESPIRATION RATE: 18 BRPM | DIASTOLIC BLOOD PRESSURE: 62 MMHG

## 2018-09-26 PROCEDURE — 3331090001 HH PPS REVENUE CREDIT

## 2018-09-26 PROCEDURE — 3331090002 HH PPS REVENUE DEBIT

## 2018-09-26 PROCEDURE — G0151 HHCP-SERV OF PT,EA 15 MIN: HCPCS

## 2018-09-27 ENCOUNTER — HOME CARE VISIT (OUTPATIENT)
Dept: SCHEDULING | Facility: HOME HEALTH | Age: 78
End: 2018-09-27
Payer: MEDICARE

## 2018-09-27 VITALS
HEART RATE: 65 BPM | SYSTOLIC BLOOD PRESSURE: 128 MMHG | DIASTOLIC BLOOD PRESSURE: 72 MMHG | OXYGEN SATURATION: 98 % | TEMPERATURE: 99 F | RESPIRATION RATE: 18 BRPM

## 2018-09-27 PROCEDURE — G0300 HHS/HOSPICE OF LPN EA 15 MIN: HCPCS

## 2018-09-27 PROCEDURE — 3331090002 HH PPS REVENUE DEBIT

## 2018-09-27 PROCEDURE — 3331090001 HH PPS REVENUE CREDIT

## 2018-09-28 PROCEDURE — 3331090002 HH PPS REVENUE DEBIT

## 2018-09-28 PROCEDURE — 3331090001 HH PPS REVENUE CREDIT

## 2018-09-29 PROCEDURE — 3331090002 HH PPS REVENUE DEBIT

## 2018-09-29 PROCEDURE — 3331090001 HH PPS REVENUE CREDIT

## 2018-09-30 PROCEDURE — 3331090001 HH PPS REVENUE CREDIT

## 2018-09-30 PROCEDURE — 3331090002 HH PPS REVENUE DEBIT

## 2018-10-01 ENCOUNTER — HOME CARE VISIT (OUTPATIENT)
Dept: SCHEDULING | Facility: HOME HEALTH | Age: 78
End: 2018-10-01
Payer: MEDICARE

## 2018-10-01 VITALS
SYSTOLIC BLOOD PRESSURE: 132 MMHG | DIASTOLIC BLOOD PRESSURE: 70 MMHG | TEMPERATURE: 98 F | HEART RATE: 70 BPM | OXYGEN SATURATION: 98 % | RESPIRATION RATE: 17 BRPM

## 2018-10-01 PROCEDURE — 3331090001 HH PPS REVENUE CREDIT

## 2018-10-01 PROCEDURE — 3331090002 HH PPS REVENUE DEBIT

## 2018-10-01 PROCEDURE — G0151 HHCP-SERV OF PT,EA 15 MIN: HCPCS

## 2018-10-02 ENCOUNTER — HOME CARE VISIT (OUTPATIENT)
Dept: HOME HEALTH SERVICES | Facility: HOME HEALTH | Age: 78
End: 2018-10-02
Payer: MEDICARE

## 2018-10-02 PROCEDURE — 3331090002 HH PPS REVENUE DEBIT

## 2018-10-02 PROCEDURE — 3331090001 HH PPS REVENUE CREDIT

## 2018-10-03 ENCOUNTER — HOME CARE VISIT (OUTPATIENT)
Dept: HOME HEALTH SERVICES | Facility: HOME HEALTH | Age: 78
End: 2018-10-03
Payer: MEDICARE

## 2018-10-03 PROCEDURE — 3331090001 HH PPS REVENUE CREDIT

## 2018-10-03 PROCEDURE — 3331090002 HH PPS REVENUE DEBIT

## 2018-10-04 ENCOUNTER — HOME CARE VISIT (OUTPATIENT)
Dept: SCHEDULING | Facility: HOME HEALTH | Age: 78
End: 2018-10-04
Payer: MEDICARE

## 2018-10-04 VITALS
DIASTOLIC BLOOD PRESSURE: 68 MMHG | HEART RATE: 65 BPM | SYSTOLIC BLOOD PRESSURE: 136 MMHG | OXYGEN SATURATION: 97 % | TEMPERATURE: 98.6 F | RESPIRATION RATE: 16 BRPM

## 2018-10-04 PROCEDURE — 3331090001 HH PPS REVENUE CREDIT

## 2018-10-04 PROCEDURE — 3331090003 HH PPS REVENUE ADJ

## 2018-10-04 PROCEDURE — 3331090002 HH PPS REVENUE DEBIT

## 2018-10-04 PROCEDURE — G0299 HHS/HOSPICE OF RN EA 15 MIN: HCPCS

## 2018-10-09 ENCOUNTER — TELEPHONE (OUTPATIENT)
Dept: ENDOCRINOLOGY | Age: 78
End: 2018-10-09

## 2018-10-09 NOTE — TELEPHONE ENCOUNTER
----- Message from Omar Hickman sent at 10/9/2018  1:37 PM EDT -----  Regarding: Dr. Elbert Cain telephone  Oneita Gamma daughter, is requesting a callback to know how to adjust the pt's insulin for her ERCP procedure scheduled on Friday.        Best contact number is [phone]

## 2018-10-10 NOTE — TELEPHONE ENCOUNTER
That was not a recommendation made by me and I am concerned that her blood sugars will increase without her oral diabetes medications. We will discuss this at her upcoming visit.

## 2018-10-10 NOTE — TELEPHONE ENCOUNTER
Ms Carpio pt's daughter was made aware that per Dr Lory Vickers, the day of surgery pt should take her current dose of Levemir but to hold all or her dm oral medications. She then stated her mom was taken off of all her diabtic oral medication and is currently only taking the Levemir. Ms Carpio voiced understanding.

## 2018-10-10 NOTE — TELEPHONE ENCOUNTER
Continue Levemir at current dose. Hold all oral medications on day of procedure. She can resume her medications the following day as long as she is cleared to eat.

## 2018-10-31 ENCOUNTER — TELEPHONE (OUTPATIENT)
Dept: ENDOCRINOLOGY | Age: 78
End: 2018-10-31

## 2018-10-31 NOTE — TELEPHONE ENCOUNTER
Daughter stated that pt's bs dropped into the mid 35s yesterday morning and again this. Pt is currently only taking Levemir 14 units in the morning and 5 units at night. Pt is currently eating her dinner around 3 pm and does not eat again until the next day (if she's able to eat). Pt was recently dx with pancreatic cancer and has had stents placed in her liver a week ago.

## 2018-10-31 NOTE — TELEPHONE ENCOUNTER
I recommend she stop taking the bedtime dose of 5 units of Levemir, but continue the 14 units in the morning. Send us some blood sugar readings in 2-3 weeks.

## 2018-10-31 NOTE — TELEPHONE ENCOUNTER
Pt's daughter was made aware to stop the evening dose of her Levemir insulin and to be sure to send in 2-3 wks of pt's bs starting with tonight's reading.

## 2018-11-12 ENCOUNTER — TELEPHONE (OUTPATIENT)
Dept: ENDOCRINOLOGY | Age: 78
End: 2018-11-12

## 2018-11-12 NOTE — TELEPHONE ENCOUNTER
Ms. Yasmeen Alexander daughter called this morning and she would like a call back from the nurse in regards to test strips for Ms. Latisha Keating.     Thanks Krysta Kaur

## 2018-11-12 NOTE — TELEPHONE ENCOUNTER
Called and spoke with Antionette Combsdominic pt's daughter and she stated that there were refill on pt's test strips at the pharmacy so we can disregard her request.

## 2018-11-20 ENCOUNTER — TELEPHONE (OUTPATIENT)
Dept: ENDOCRINOLOGY | Age: 78
End: 2018-11-20

## 2018-11-20 NOTE — TELEPHONE ENCOUNTER
Spoke to daughter after receiving blood sugar log.  Advised her to change insulin to 8 units AM and PM.

## 2018-11-28 ENCOUNTER — TELEPHONE (OUTPATIENT)
Dept: ENDOCRINOLOGY | Age: 78
End: 2018-11-28

## 2018-11-28 NOTE — TELEPHONE ENCOUNTER
Called and spoke to Megan to make her aware that Dr Lucy Aguilar increased   Mrs Walker Slight morning dose of Levemir yesterday to 10 units, but she will remain on the current dose 8 units in the evening.

## 2018-11-28 NOTE — TELEPHONE ENCOUNTER
----- Message from Andrés Segura sent at 11/28/2018  2:52 PM EST -----  Regarding: Dr Kirk Bañuelos, [facility] Nurse, Nanette, needs to verify the dosing on pt's insulin. Pt reported that daughter called with change in dosing.     Best contact # [phone]

## 2018-11-28 NOTE — TELEPHONE ENCOUNTER
Message is from [employer] services [facility])    Thanks Shauna Echeverria.

## 2018-11-28 NOTE — TELEPHONE ENCOUNTER
----- Message from Usha Myers sent at 11/28/2018  2:52 PM EST -----  Regarding: Dr Fabiano Adams, [facility] Nurse, [facility], needs to verify the dosing on pt's insulin. Pt reported that daughter called with change in dosing.     Best contact # [phone]

## 2018-12-19 ENCOUNTER — TELEPHONE (OUTPATIENT)
Dept: ENDOCRINOLOGY | Age: 78
End: 2018-12-19

## 2018-12-19 NOTE — TELEPHONE ENCOUNTER
Spoke with Vilma José and she stated that since the decrease in the levemir,  pt's fasting bs has gotten better and they are averaging between 97-140s range. She did have 2 high fasting glucose readings of 200 and 240, but that was only 2 days after starting the new insulin regiment. However, pt's evening bs reading before dinner are ranging between 197-337. Please advise    Pt is currently only taking Levemir 10 in the morning and 8 units before bed. FYI, due to being recently dx with pancreatic cancer, she is no longer taking pioglitazone, glipizide, and metformin per daughter.

## 2018-12-19 NOTE — TELEPHONE ENCOUNTER
Have her increase the morning dose to 14 units but decrease the evening dose to 6 units of levemir. Hopefully this will help with the highs before dinner but avoid lows in the morning.

## 2018-12-19 NOTE — TELEPHONE ENCOUNTER
----- Message from Philippe Bernal sent at 12/19/2018  2:44 PM EST -----  Regarding: Dr. Juanis Spear, Home Health Nurse, advised that BS has been elevated lately and inquired about poss potential adjustments to medication (requested a call back). Best contact [phone].

## 2018-12-19 NOTE — TELEPHONE ENCOUNTER
Quentin Claudio was made aware of the verbal order for the new insulin dose. She stated that if a signature is needed, she will fax over an order (to [facility])  She was then made aware that you will be out of the office until later next week and she stated that the order can be sign whenever you return.

## 2018-12-20 NOTE — TELEPHONE ENCOUNTER
----- Message from Hernan White sent at 12/20/2018 10:49 AM EST -----  Regarding: Dr Abi Ornelas  Pt's daughter Inge Sierra p) [phone], would like a return call from the nurse regarding the dosage increase on her mother's  insulin

## 2018-12-20 NOTE — TELEPHONE ENCOUNTER
Oxycodone was more likely to cause nausea/vomiting    How are her glucoses on higher dose of Levemir.

## 2018-12-20 NOTE — TELEPHONE ENCOUNTER
12/20/2018  10:55 AM        Please see message from answering service regarding insulin.         Thanks

## 2018-12-20 NOTE — TELEPHONE ENCOUNTER
Called and spoke with Deepa Mallorie gonsalez's daughter and she stated that they increased Mrs Homar Kinsey Levemir dose to 14 units and then she started vomiting. She was then asked whether or not the increased could have caused her to vomit. I asked whether or not she has started any new medication and she said yes, and that her doctor took her off of the Tramadol and placed her on Oxycodone along with a nausea medication.

## 2018-12-24 ENCOUNTER — TELEPHONE (OUTPATIENT)
Dept: ENDOCRINOLOGY | Age: 78
End: 2018-12-24

## 2018-12-24 NOTE — TELEPHONE ENCOUNTER
Daughter called Sherren Portal) and wanted to make Dr Oliverio Mix aware that her mom had past last night. She was unable to give detail information, but stated that she was in the hospital and her kidneys started shutting down along with having problems with her heart. Daughter wanted to thank you for caring for her mom and stated that her mom thought very highly of you and your staff.

## 2019-10-01 NOTE — PATIENT INSTRUCTIONS
[facility]    Progress Note      Assessment and Plan:     1. Dyspnea with pleural effusion– Klebsiella bacteremia. The patient is doing well from the pulmonary perspective. Recommendations:  1.   Pleurx drainage today and then again qod Continue taking the following diabetes medications:  - Glipizide 10mg before breakfast  - Metformin 1000mg twice a day (after breakfast and after dinner)    Check blood sugar in the morning and in the evening. Have your A1c checked next time you have bloodwork done in February. Please have your doctor fax me the results.    ===================================================================    Endocrinology Instructions    Thank you for choosing [facility] to care for you and your health needs. Please follow these instructions to get in touch with us if you have a problem and to prepare for upcoming appointments. 1. For any questions regarding prescriptions or if you need medical advice, our office number is [phone].   2. Our office fax number is [phone].  3. You may also use [employer] to access your lab results or to send messages to your provider. 4. If there are routine labs that need to be checked, please have these done within 2-7 days prior to your next appointment. We will provide you with a copy of the lab order. 5. If you have diabetes, please make sure you bring your blood sugar log or glucometer with you to each office visit.     Thank you,   Feliciano Brunner MD  [facility]  [address] [place] [address]
